# Patient Record
Sex: FEMALE | Race: WHITE | Employment: UNEMPLOYED | URBAN - NONMETROPOLITAN AREA
[De-identification: names, ages, dates, MRNs, and addresses within clinical notes are randomized per-mention and may not be internally consistent; named-entity substitution may affect disease eponyms.]

---

## 2017-01-05 ENCOUNTER — TELEPHONE (OUTPATIENT)
Dept: FAMILY MEDICINE CLINIC | Age: 62
End: 2017-01-05

## 2017-01-05 DIAGNOSIS — F17.200 NICOTINE USE DISORDER: ICD-10-CM

## 2017-01-05 RX ORDER — VARENICLINE TARTRATE 25 MG
KIT ORAL
Qty: 42 TABLET | Refills: 0 | Status: SHIPPED | OUTPATIENT
Start: 2017-01-05 | End: 2017-01-27 | Stop reason: ALTCHOICE

## 2017-01-05 RX ORDER — VARENICLINE TARTRATE 1 MG/1
1 TABLET, FILM COATED ORAL 2 TIMES DAILY
Qty: 60 TABLET | Refills: 3 | Status: SHIPPED | OUTPATIENT
Start: 2017-01-05 | End: 2017-01-27 | Stop reason: ALTCHOICE

## 2017-01-24 DIAGNOSIS — E78.5 HYPERLIPIDEMIA WITH TARGET LDL LESS THAN 130: ICD-10-CM

## 2017-01-24 RX ORDER — ATORVASTATIN CALCIUM 10 MG/1
TABLET, FILM COATED ORAL
Qty: 90 TABLET | Refills: 0 | Status: SHIPPED | OUTPATIENT
Start: 2017-01-24 | End: 2017-02-27 | Stop reason: SDUPTHER

## 2017-01-27 ENCOUNTER — OFFICE VISIT (OUTPATIENT)
Dept: FAMILY MEDICINE CLINIC | Age: 62
End: 2017-01-27

## 2017-01-27 VITALS
DIASTOLIC BLOOD PRESSURE: 76 MMHG | HEIGHT: 67 IN | OXYGEN SATURATION: 97 % | WEIGHT: 243.6 LBS | BODY MASS INDEX: 38.24 KG/M2 | SYSTOLIC BLOOD PRESSURE: 110 MMHG | HEART RATE: 97 BPM

## 2017-01-27 DIAGNOSIS — I10 ESSENTIAL HYPERTENSION: ICD-10-CM

## 2017-01-27 DIAGNOSIS — T78.3XXA ANGIOEDEMA, INITIAL ENCOUNTER: Primary | ICD-10-CM

## 2017-01-27 PROCEDURE — 99213 OFFICE O/P EST LOW 20 MIN: CPT | Performed by: FAMILY MEDICINE

## 2017-01-29 ASSESSMENT — ENCOUNTER SYMPTOMS
COUGH: 1
CHOKING: 1
STRIDOR: 1
TROUBLE SWALLOWING: 1
SHORTNESS OF BREATH: 1
FACIAL SWELLING: 1
VOICE CHANGE: 1

## 2017-01-30 ENCOUNTER — TELEPHONE (OUTPATIENT)
Dept: FAMILY MEDICINE CLINIC | Age: 62
End: 2017-01-30

## 2017-02-27 ENCOUNTER — OFFICE VISIT (OUTPATIENT)
Dept: FAMILY MEDICINE CLINIC | Age: 62
End: 2017-02-27

## 2017-02-27 VITALS
WEIGHT: 245 LBS | OXYGEN SATURATION: 92 % | SYSTOLIC BLOOD PRESSURE: 118 MMHG | HEART RATE: 85 BPM | DIASTOLIC BLOOD PRESSURE: 90 MMHG | BODY MASS INDEX: 38.45 KG/M2 | HEIGHT: 67 IN | RESPIRATION RATE: 18 BRPM

## 2017-02-27 DIAGNOSIS — I10 ESSENTIAL HYPERTENSION: ICD-10-CM

## 2017-02-27 DIAGNOSIS — F41.0 PANIC DISORDER: ICD-10-CM

## 2017-02-27 DIAGNOSIS — E78.5 HYPERLIPIDEMIA WITH TARGET LDL LESS THAN 130: ICD-10-CM

## 2017-02-27 DIAGNOSIS — F32.89 OTHER DEPRESSION: Primary | ICD-10-CM

## 2017-02-27 DIAGNOSIS — R30.0 DYSURIA: ICD-10-CM

## 2017-02-27 PROCEDURE — 99214 OFFICE O/P EST MOD 30 MIN: CPT | Performed by: FAMILY MEDICINE

## 2017-02-27 RX ORDER — HYDROCHLOROTHIAZIDE 25 MG/1
25 TABLET ORAL DAILY
Qty: 90 TABLET | Refills: 1 | Status: CANCELLED | OUTPATIENT
Start: 2017-02-27

## 2017-02-27 RX ORDER — CITALOPRAM 40 MG/1
40 TABLET ORAL DAILY
Qty: 90 TABLET | Refills: 1 | Status: SHIPPED | OUTPATIENT
Start: 2017-02-27 | End: 2017-08-21 | Stop reason: SDUPTHER

## 2017-02-27 RX ORDER — ATORVASTATIN CALCIUM 10 MG/1
TABLET, FILM COATED ORAL
Qty: 90 TABLET | Refills: 1 | Status: SHIPPED | OUTPATIENT
Start: 2017-02-27 | End: 2017-08-21 | Stop reason: SDUPTHER

## 2017-02-27 RX ORDER — CLONAZEPAM 1 MG/1
TABLET ORAL
Qty: 180 TABLET | Refills: 1 | Status: CANCELLED | OUTPATIENT
Start: 2017-02-27

## 2017-02-27 RX ORDER — HYDROCHLOROTHIAZIDE 25 MG/1
25 TABLET ORAL DAILY
Qty: 90 TABLET | Refills: 1 | Status: SHIPPED | OUTPATIENT
Start: 2017-02-27 | End: 2017-08-21 | Stop reason: SDUPTHER

## 2017-02-27 RX ORDER — CITALOPRAM 20 MG/1
TABLET ORAL
Qty: 90 TABLET | Refills: 1 | Status: CANCELLED | OUTPATIENT
Start: 2017-02-27

## 2017-02-27 RX ORDER — ATORVASTATIN CALCIUM 10 MG/1
TABLET, FILM COATED ORAL
Qty: 90 TABLET | Refills: 1 | Status: CANCELLED | OUTPATIENT
Start: 2017-02-27

## 2017-02-27 RX ORDER — CLONAZEPAM 0.5 MG/1
0.5 TABLET ORAL 2 TIMES DAILY
Qty: 180 TABLET | Refills: 0 | Status: SHIPPED | OUTPATIENT
Start: 2017-02-27 | End: 2017-05-26 | Stop reason: SDUPTHER

## 2017-02-27 ASSESSMENT — PATIENT HEALTH QUESTIONNAIRE - PHQ9
2. FEELING DOWN, DEPRESSED OR HOPELESS: 1
1. LITTLE INTEREST OR PLEASURE IN DOING THINGS: 1
SUM OF ALL RESPONSES TO PHQ9 QUESTIONS 1 & 2: 2
SUM OF ALL RESPONSES TO PHQ QUESTIONS 1-9: 2

## 2017-03-27 PROBLEM — Z72.0 TOBACCO ABUSE: Status: ACTIVE | Noted: 2017-03-27

## 2017-03-27 PROBLEM — R07.9 CHEST PAIN: Status: ACTIVE | Noted: 2017-03-27

## 2017-03-28 ENCOUNTER — CARE COORDINATION (OUTPATIENT)
Dept: CARE COORDINATION | Age: 62
End: 2017-03-28

## 2017-06-27 ENCOUNTER — OFFICE VISIT (OUTPATIENT)
Dept: ORTHOPEDIC SURGERY | Age: 62
End: 2017-06-27

## 2017-06-27 ENCOUNTER — TELEPHONE (OUTPATIENT)
Dept: ORTHOPEDIC SURGERY | Age: 62
End: 2017-06-27

## 2017-06-27 VITALS
DIASTOLIC BLOOD PRESSURE: 83 MMHG | SYSTOLIC BLOOD PRESSURE: 139 MMHG | BODY MASS INDEX: 38.44 KG/M2 | HEIGHT: 67 IN | HEART RATE: 75 BPM | WEIGHT: 244.93 LBS

## 2017-06-27 DIAGNOSIS — M51.36 DDD (DEGENERATIVE DISC DISEASE), LUMBAR: Primary | ICD-10-CM

## 2017-06-27 PROCEDURE — 99214 OFFICE O/P EST MOD 30 MIN: CPT | Performed by: PHYSICIAN ASSISTANT

## 2017-06-27 PROCEDURE — L0627 LO SAG RI AN/POS PNL PRE CST: HCPCS | Performed by: PHYSICIAN ASSISTANT

## 2017-06-27 PROCEDURE — 72110 X-RAY EXAM L-2 SPINE 4/>VWS: CPT | Performed by: PHYSICIAN ASSISTANT

## 2017-06-27 RX ORDER — CYCLOBENZAPRINE HCL 10 MG
TABLET ORAL
Qty: 30 TABLET | Refills: 0 | Status: SHIPPED | OUTPATIENT
Start: 2017-06-27 | End: 2017-07-31 | Stop reason: SDUPTHER

## 2017-06-27 RX ORDER — PREDNISONE 10 MG/1
TABLET ORAL
Qty: 26 TABLET | Refills: 0 | Status: SHIPPED | OUTPATIENT
Start: 2017-06-27 | End: 2017-08-21 | Stop reason: ALTCHOICE

## 2017-08-01 RX ORDER — CYCLOBENZAPRINE HCL 10 MG
TABLET ORAL
Qty: 30 TABLET | Refills: 0 | Status: SHIPPED | OUTPATIENT
Start: 2017-08-01 | End: 2017-08-21 | Stop reason: SDUPTHER

## 2017-08-16 DIAGNOSIS — Z12.31 SCREENING MAMMOGRAM, ENCOUNTER FOR: Primary | ICD-10-CM

## 2017-08-21 ENCOUNTER — OFFICE VISIT (OUTPATIENT)
Dept: FAMILY MEDICINE CLINIC | Age: 62
End: 2017-08-21

## 2017-08-21 VITALS
OXYGEN SATURATION: 97 % | BODY MASS INDEX: 38.45 KG/M2 | SYSTOLIC BLOOD PRESSURE: 120 MMHG | TEMPERATURE: 97.7 F | DIASTOLIC BLOOD PRESSURE: 76 MMHG | HEART RATE: 83 BPM | RESPIRATION RATE: 17 BRPM | WEIGHT: 245 LBS

## 2017-08-21 DIAGNOSIS — I10 ESSENTIAL HYPERTENSION: Primary | ICD-10-CM

## 2017-08-21 DIAGNOSIS — R31.9 HEMATURIA: ICD-10-CM

## 2017-08-21 DIAGNOSIS — M54.50 CHRONIC BILATERAL LOW BACK PAIN WITHOUT SCIATICA: ICD-10-CM

## 2017-08-21 DIAGNOSIS — F32.A DEPRESSION, UNSPECIFIED DEPRESSION TYPE: ICD-10-CM

## 2017-08-21 DIAGNOSIS — F41.0 PANIC DISORDER: ICD-10-CM

## 2017-08-21 DIAGNOSIS — G89.29 CHRONIC BILATERAL LOW BACK PAIN WITHOUT SCIATICA: ICD-10-CM

## 2017-08-21 DIAGNOSIS — E78.5 HYPERLIPIDEMIA WITH TARGET LDL LESS THAN 130: ICD-10-CM

## 2017-08-21 DIAGNOSIS — R73.02 IMPAIRED GLUCOSE TOLERANCE: ICD-10-CM

## 2017-08-21 LAB
BILIRUBIN URINE: NEGATIVE
BILIRUBIN, POC: ABNORMAL
BLOOD URINE, POC: ABNORMAL
BLOOD, URINE: NEGATIVE
CLARITY, POC: ABNORMAL
CLARITY: CLEAR
COLOR, POC: ABNORMAL
COLOR: YELLOW
EPITHELIAL CELLS, UA: 1 /HPF (ref 0–5)
GLUCOSE URINE, POC: ABNORMAL
GLUCOSE URINE: NEGATIVE MG/DL
HYALINE CASTS: 2 /LPF (ref 0–8)
KETONES, POC: ABNORMAL
KETONES, URINE: NEGATIVE MG/DL
LEUKOCYTE EST, POC: ABNORMAL
LEUKOCYTE ESTERASE, URINE: NEGATIVE
MICROSCOPIC EXAMINATION: NORMAL
NITRITE, POC: ABNORMAL
NITRITE, URINE: NEGATIVE
PH UA: 6
PH, POC: 6
PROTEIN UA: NEGATIVE MG/DL
PROTEIN, POC: ABNORMAL
RBC UA: 4 /HPF (ref 0–4)
SPECIFIC GRAVITY UA: 1.02
SPECIFIC GRAVITY, POC: 1.01
UROBILINOGEN, POC: 0.2
UROBILINOGEN, URINE: 0.2 E.U./DL
WBC UA: 1 /HPF (ref 0–5)

## 2017-08-21 PROCEDURE — 81001 URINALYSIS AUTO W/SCOPE: CPT | Performed by: FAMILY MEDICINE

## 2017-08-21 PROCEDURE — 81002 URINALYSIS NONAUTO W/O SCOPE: CPT | Performed by: FAMILY MEDICINE

## 2017-08-21 PROCEDURE — 99214 OFFICE O/P EST MOD 30 MIN: CPT | Performed by: FAMILY MEDICINE

## 2017-08-21 RX ORDER — CYCLOBENZAPRINE HCL 10 MG
TABLET ORAL
Qty: 30 TABLET | Refills: 5 | Status: SHIPPED | OUTPATIENT
Start: 2017-08-21 | End: 2018-02-14 | Stop reason: SDUPTHER

## 2017-08-21 RX ORDER — HYDROCHLOROTHIAZIDE 25 MG/1
25 TABLET ORAL DAILY
Qty: 90 TABLET | Refills: 1 | Status: SHIPPED | OUTPATIENT
Start: 2017-08-21 | End: 2018-02-14 | Stop reason: SDUPTHER

## 2017-08-21 RX ORDER — ATORVASTATIN CALCIUM 10 MG/1
TABLET, FILM COATED ORAL
Qty: 90 TABLET | Refills: 1 | Status: SHIPPED | OUTPATIENT
Start: 2017-08-21 | End: 2018-02-14 | Stop reason: SDUPTHER

## 2017-08-21 RX ORDER — CLONAZEPAM 0.5 MG/1
TABLET ORAL
Qty: 60 TABLET | Refills: 5 | Status: SHIPPED | OUTPATIENT
Start: 2017-08-21 | End: 2018-02-14 | Stop reason: SDUPTHER

## 2017-08-21 RX ORDER — CITALOPRAM 40 MG/1
40 TABLET ORAL DAILY
Qty: 90 TABLET | Refills: 1 | Status: SHIPPED | OUTPATIENT
Start: 2017-08-21 | End: 2018-02-14 | Stop reason: SDUPTHER

## 2017-08-22 LAB
ORGANISM: ABNORMAL
URINE CULTURE, ROUTINE: ABNORMAL

## 2017-08-28 ENCOUNTER — OFFICE VISIT (OUTPATIENT)
Dept: FAMILY MEDICINE CLINIC | Age: 62
End: 2017-08-28

## 2017-08-28 VITALS
SYSTOLIC BLOOD PRESSURE: 128 MMHG | RESPIRATION RATE: 16 BRPM | OXYGEN SATURATION: 96 % | DIASTOLIC BLOOD PRESSURE: 88 MMHG | HEIGHT: 67 IN | HEART RATE: 83 BPM | BODY MASS INDEX: 38.24 KG/M2 | WEIGHT: 243.6 LBS

## 2017-08-28 DIAGNOSIS — Z01.411 ENCOUNTER FOR GYNECOLOGICAL EXAMINATION WITH ABNORMAL FINDING: Primary | ICD-10-CM

## 2017-08-28 DIAGNOSIS — N89.8 DISCHARGE FROM THE VAGINA: ICD-10-CM

## 2017-08-28 DIAGNOSIS — R31.9 HEMATURIA: ICD-10-CM

## 2017-08-28 PROCEDURE — 99396 PREV VISIT EST AGE 40-64: CPT | Performed by: NURSE PRACTITIONER

## 2017-08-28 ASSESSMENT — ENCOUNTER SYMPTOMS: GASTROINTESTINAL NEGATIVE: 1

## 2017-08-30 LAB — GENITAL CULTURE, ROUTINE: NORMAL

## 2017-09-01 ENCOUNTER — TELEPHONE (OUTPATIENT)
Dept: FAMILY MEDICINE CLINIC | Age: 62
End: 2017-09-01

## 2017-09-05 DIAGNOSIS — R31.9 HEMATURIA: Primary | ICD-10-CM

## 2017-09-05 RX ORDER — METRONIDAZOLE 500 MG/1
TABLET ORAL
Qty: 8 TABLET | Refills: 0 | Status: SHIPPED | OUTPATIENT
Start: 2017-09-05 | End: 2017-12-14

## 2017-10-05 ENCOUNTER — HOSPITAL ENCOUNTER (OUTPATIENT)
Dept: CT IMAGING | Age: 62
Discharge: HOME | End: 2017-10-05
Attending: UROLOGY | Admitting: UROLOGY

## 2017-10-05 DIAGNOSIS — R31.0 GROSS HEMATURIA: ICD-10-CM

## 2017-10-05 DIAGNOSIS — R31.9 HEMATURIA: ICD-10-CM

## 2017-10-05 LAB
BUN BLDV-MCNC: 11 MG/DL (ref 7–20)
CREAT SERPL-MCNC: 0.6 MG/DL (ref 0.6–1.2)
GFR AFRICAN AMERICAN: >60
GFR NON-AFRICAN AMERICAN: >60

## 2017-10-09 ENCOUNTER — TELEPHONE (OUTPATIENT)
Dept: FAMILY MEDICINE CLINIC | Age: 62
End: 2017-10-09

## 2017-10-10 NOTE — TELEPHONE ENCOUNTER
CT of abdomennormal.  This test was ordered by urologist.  I am assuming they are planning to do a cystoscopy. is that correct?

## 2018-02-14 ENCOUNTER — OFFICE VISIT (OUTPATIENT)
Dept: FAMILY MEDICINE CLINIC | Age: 63
End: 2018-02-14

## 2018-02-14 VITALS
DIASTOLIC BLOOD PRESSURE: 85 MMHG | BODY MASS INDEX: 39.71 KG/M2 | SYSTOLIC BLOOD PRESSURE: 123 MMHG | OXYGEN SATURATION: 96 % | TEMPERATURE: 97.7 F | WEIGHT: 246 LBS | HEART RATE: 77 BPM

## 2018-02-14 DIAGNOSIS — I10 ESSENTIAL HYPERTENSION: Primary | ICD-10-CM

## 2018-02-14 DIAGNOSIS — F41.0 PANIC DISORDER: ICD-10-CM

## 2018-02-14 DIAGNOSIS — F32.A DEPRESSION, UNSPECIFIED DEPRESSION TYPE: ICD-10-CM

## 2018-02-14 DIAGNOSIS — E78.5 HYPERLIPIDEMIA WITH TARGET LDL LESS THAN 130: ICD-10-CM

## 2018-02-14 DIAGNOSIS — G89.29 CHRONIC BILATERAL LOW BACK PAIN WITHOUT SCIATICA: ICD-10-CM

## 2018-02-14 DIAGNOSIS — M54.17 LUMBOSACRAL RADICULOPATHY AT L5: ICD-10-CM

## 2018-02-14 DIAGNOSIS — M54.50 CHRONIC BILATERAL LOW BACK PAIN WITHOUT SCIATICA: ICD-10-CM

## 2018-02-14 DIAGNOSIS — F51.01 PRIMARY INSOMNIA: ICD-10-CM

## 2018-02-14 PROCEDURE — 99214 OFFICE O/P EST MOD 30 MIN: CPT | Performed by: FAMILY MEDICINE

## 2018-02-14 RX ORDER — CYCLOBENZAPRINE HCL 10 MG
TABLET ORAL
Qty: 30 TABLET | Refills: 5 | Status: SHIPPED | OUTPATIENT
Start: 2018-02-14 | End: 2018-08-01 | Stop reason: SDUPTHER

## 2018-02-14 RX ORDER — ATORVASTATIN CALCIUM 10 MG/1
TABLET, FILM COATED ORAL
Qty: 30 TABLET | Refills: 5 | Status: SHIPPED | OUTPATIENT
Start: 2018-02-14 | End: 2018-07-17 | Stop reason: SDUPTHER

## 2018-02-14 RX ORDER — HYDROCHLOROTHIAZIDE 25 MG/1
25 TABLET ORAL DAILY
Qty: 30 TABLET | Refills: 5 | Status: SHIPPED | OUTPATIENT
Start: 2018-02-14 | End: 2018-08-01 | Stop reason: SDUPTHER

## 2018-02-14 RX ORDER — ZOLPIDEM TARTRATE 5 MG/1
TABLET ORAL
Qty: 15 TABLET | Refills: 0 | Status: SHIPPED | OUTPATIENT
Start: 2018-02-14 | End: 2018-03-14

## 2018-02-14 RX ORDER — CLONAZEPAM 0.5 MG/1
TABLET ORAL
Qty: 60 TABLET | Refills: 5 | Status: SHIPPED | OUTPATIENT
Start: 2018-02-14 | End: 2018-08-01 | Stop reason: SDUPTHER

## 2018-02-14 RX ORDER — CITALOPRAM 40 MG/1
40 TABLET ORAL DAILY
Qty: 30 TABLET | Refills: 5 | Status: SHIPPED | OUTPATIENT
Start: 2018-02-14 | End: 2018-08-01 | Stop reason: SDUPTHER

## 2018-02-14 NOTE — PROGRESS NOTES
Subjective:      Patient ID: Quita Siddiqi is a 59 y.o. female. HPI   Chief Complaint   Patient presents with    Hyperlipidemia-no problem with compliance;no myalgias;      patient is fasting-barrrier to care-no insurance;$$$$    Hypertension-Has had a few panic attacks but not chest pain associated with exertion ;Denies cv/cns/kassi sx     Depression-Patient went on during this interview about heard terrible home situation with grandchildren children dogs. Sounds like. Chaos. On top of thatno medical insurancearguments within the familyfinancial problems     Anxiety-Reports some problems with increased anxiety.  Gastroesophageal Reflux-No recent flare in heartburn;no wt loss or progressive sx     Flu Vaccine     declined    Hematuria     see last visit;saw mark for gyne and saw dorothea Sahni-for blood-no cystoscopy-no insurance;knows she needs thst ;no more bl,ood in urine. Chief complaint and present illness: 61-year-old white female presents unaccompanied with multiple issues. Please see above    Review of Systems   All other systems reviewed and are negative. Objective:   Physical Exam   Constitutional: She is oriented to person, place, and time. She appears well-developed and well-nourished. Neck: Neck supple. Carotid bruit is not present. No thyromegaly present. Cardiovascular: Normal rate, regular rhythm and normal heart sounds. Pulmonary/Chest: Effort normal and breath sounds normal. She has no wheezes. Lymphadenopathy:     She has no cervical adenopathy. Neurological: She is alert and oriented to person, place, and time. Skin: Skin is warm. Psychiatric: She has a normal mood and affect. Her behavior is normal.   Nursing note and vitals reviewed.    /85 (Site: Left Arm, Position: Sitting, Cuff Size: Large Adult)   Pulse 77   Temp 97.7 °F (36.5 °C) (Oral)   Wt 246 lb (111.6 kg)   SpO2 96% Comment: room air  BMI 39.71 kg/m²       Assessment:      [facility]

## 2018-05-15 DIAGNOSIS — Z12.11 SCREENING FOR COLON CANCER: Primary | ICD-10-CM

## 2018-07-17 DIAGNOSIS — I10 ESSENTIAL HYPERTENSION: ICD-10-CM

## 2018-07-17 DIAGNOSIS — E78.5 HYPERLIPIDEMIA WITH TARGET LDL LESS THAN 130: ICD-10-CM

## 2018-07-17 RX ORDER — ATORVASTATIN CALCIUM 10 MG/1
TABLET, FILM COATED ORAL
Qty: 30 TABLET | Refills: 0 | Status: SHIPPED | OUTPATIENT
Start: 2018-07-17 | End: 2018-08-01 | Stop reason: SDUPTHER

## 2018-08-01 ENCOUNTER — OFFICE VISIT (OUTPATIENT)
Dept: FAMILY MEDICINE CLINIC | Age: 63
End: 2018-08-01

## 2018-08-01 VITALS
BODY MASS INDEX: 39.09 KG/M2 | SYSTOLIC BLOOD PRESSURE: 132 MMHG | HEART RATE: 83 BPM | OXYGEN SATURATION: 97 % | DIASTOLIC BLOOD PRESSURE: 82 MMHG | WEIGHT: 249.6 LBS

## 2018-08-01 DIAGNOSIS — G89.29 CHRONIC BILATERAL LOW BACK PAIN WITHOUT SCIATICA: ICD-10-CM

## 2018-08-01 DIAGNOSIS — F32.A DEPRESSION, UNSPECIFIED DEPRESSION TYPE: ICD-10-CM

## 2018-08-01 DIAGNOSIS — E78.5 HYPERLIPIDEMIA WITH TARGET LDL LESS THAN 130: ICD-10-CM

## 2018-08-01 DIAGNOSIS — M54.50 CHRONIC BILATERAL LOW BACK PAIN WITHOUT SCIATICA: ICD-10-CM

## 2018-08-01 DIAGNOSIS — I10 ESSENTIAL HYPERTENSION: ICD-10-CM

## 2018-08-01 DIAGNOSIS — F41.0 PANIC DISORDER: ICD-10-CM

## 2018-08-01 PROCEDURE — 99214 OFFICE O/P EST MOD 30 MIN: CPT | Performed by: FAMILY MEDICINE

## 2018-08-01 RX ORDER — HYDROCHLOROTHIAZIDE 25 MG/1
25 TABLET ORAL DAILY
Qty: 30 TABLET | Refills: 5 | Status: SHIPPED | OUTPATIENT
Start: 2018-08-01 | End: 2018-10-02 | Stop reason: ALTCHOICE

## 2018-08-01 RX ORDER — CITALOPRAM 40 MG/1
40 TABLET ORAL DAILY
Qty: 30 TABLET | Refills: 5 | Status: SHIPPED | OUTPATIENT
Start: 2018-08-01 | End: 2019-01-16 | Stop reason: SDUPTHER

## 2018-08-01 RX ORDER — CYCLOBENZAPRINE HCL 10 MG
TABLET ORAL
Qty: 30 TABLET | Refills: 5 | Status: SHIPPED | OUTPATIENT
Start: 2018-08-01 | End: 2019-01-16 | Stop reason: SDUPTHER

## 2018-08-01 RX ORDER — ATORVASTATIN CALCIUM 10 MG/1
TABLET, FILM COATED ORAL
Qty: 30 TABLET | Refills: 5 | Status: SHIPPED | OUTPATIENT
Start: 2018-08-01 | End: 2019-01-16 | Stop reason: SDUPTHER

## 2018-08-01 RX ORDER — CLONAZEPAM 0.5 MG/1
TABLET ORAL
Qty: 60 TABLET | Refills: 5 | Status: SHIPPED | OUTPATIENT
Start: 2018-08-01 | End: 2019-01-16 | Stop reason: SDUPTHER

## 2018-08-01 NOTE — PROGRESS NOTES
Subjective:      Patient ID: Karsten Alvarado is a 59 y.o. female. HPI  Chief Complaint   Patient presents with    Hyperlipidemia     6 month f/u; plans good; no myalgias    Hypertension     needs refill ; no cardiovascular, CNS or peripheral vascular complaints    Depression     Son has relapsed his opioid addiction and will be going back to [employer]; she had been hoping her grandson would be able to live with him and that her son would stay drug free; this is the third time.  Chronic Pain     Chronic bilateral low back pain without sciatica    Fall     7/1/2018: Cup in the dark to go to the restroom, tripped on a case of soft drinks which had been placed there by her nephew; discussion RE safety in the home     Chief complaint and present illness: 71-year-old white female presents unaccompanied for routine follow-up. Please see above    Review of Systems   All other systems reviewed and are negative. background/entire past medical,social and family history obtained and reviewed/updated today   Objective:   Physical Exam   Constitutional: She appears well-developed and well-nourished. Neck: Neck supple. Carotid bruit is not present. No thyromegaly present. Cardiovascular: Normal rate, regular rhythm and normal heart sounds. Pulmonary/Chest: Effort normal and breath sounds normal. She has no wheezes. Musculoskeletal: She exhibits no edema. Lymphadenopathy:     She has no cervical adenopathy. Neurological: She is alert. Skin: Skin is warm. Psychiatric: She has a normal mood and affect. Her behavior is normal.   Nursing note and vitals reviewed. /82   Pulse 83   Wt 249 lb 9.6 oz (113.2 kg)   SpO2 97%   BMI 39.09 kg/m²     Assessment:      Joshua Salmeron was seen today for hyperlipidemia, hypertension, depression, chronic pain and fall. Diagnoses and all orders for this visit:    Essential hypertension  -     hydrochlorothiazide (HYDRODIURIL) 25 MG tablet;  Take 1 tablet by mouth daily  - atorvastatin (LIPITOR) 10 MG tablet; TAKE (1) TABLET DAILY    Chronic bilateral low back pain without sciatica  -     cyclobenzaprine (FLEXERIL) 10 MG tablet; TAKE 1 TABLET AT BEDTIME AS NEEDED    Panic disorder  -     clonazePAM (KLONOPIN) 0.5 MG tablet; TAKE 1 TABLET TWICE DAILY. Depression, unspecified depression type  -     citalopram (CELEXA) 40 MG tablet; Take 1 tablet by mouth daily    Hyperlipidemia with target LDL less than 130  -     atorvastatin (LIPITOR) 10 MG tablet; TAKE (1) TABLET DAILY           Plan:      Return in about 6 months (around 2/1/2019). There are no Patient Instructions on file for this visit.

## 2018-10-02 ENCOUNTER — OFFICE VISIT (OUTPATIENT)
Dept: FAMILY MEDICINE CLINIC | Age: 63
End: 2018-10-02

## 2018-10-02 DIAGNOSIS — I10 ESSENTIAL HYPERTENSION: ICD-10-CM

## 2018-10-02 DIAGNOSIS — R60.9 DEPENDENT EDEMA: Primary | ICD-10-CM

## 2018-10-02 LAB
ALBUMIN SERPL-MCNC: 4.2 G/DL (ref 3.4–5)
ANION GAP SERPL CALCULATED.3IONS-SCNC: 14 MMOL/L (ref 3–16)
BILIRUBIN, POC: ABNORMAL
BLOOD URINE, POC: ABNORMAL
BUN BLDV-MCNC: 15 MG/DL (ref 7–20)
CALCIUM SERPL-MCNC: 9.8 MG/DL (ref 8.3–10.6)
CHLORIDE BLD-SCNC: 94 MMOL/L (ref 99–110)
CLARITY, POC: CLEAR
CO2: 30 MMOL/L (ref 21–32)
COLOR, POC: ABNORMAL
CREAT SERPL-MCNC: 0.6 MG/DL (ref 0.6–1.2)
GFR AFRICAN AMERICAN: >60
GFR NON-AFRICAN AMERICAN: >60
GLUCOSE BLD-MCNC: 115 MG/DL (ref 70–99)
GLUCOSE URINE, POC: ABNORMAL
KETONES, POC: ABNORMAL
LEUKOCYTE EST, POC: ABNORMAL
NITRITE, POC: ABNORMAL
PH, POC: 6
PHOSPHORUS: 3 MG/DL (ref 2.5–4.9)
POTASSIUM SERPL-SCNC: 3.8 MMOL/L (ref 3.5–5.1)
PROTEIN, POC: ABNORMAL
SODIUM BLD-SCNC: 138 MMOL/L (ref 136–145)
SPECIFIC GRAVITY, POC: 1.02
UROBILINOGEN, POC: 0.2

## 2018-10-02 PROCEDURE — 99213 OFFICE O/P EST LOW 20 MIN: CPT | Performed by: FAMILY MEDICINE

## 2018-10-02 PROCEDURE — 36415 COLL VENOUS BLD VENIPUNCTURE: CPT | Performed by: FAMILY MEDICINE

## 2018-10-02 PROCEDURE — 81002 URINALYSIS NONAUTO W/O SCOPE: CPT | Performed by: FAMILY MEDICINE

## 2018-10-02 RX ORDER — POTASSIUM CHLORIDE 20 MEQ/1
20 TABLET, EXTENDED RELEASE ORAL DAILY
Qty: 30 TABLET | Refills: 0 | Status: SHIPPED | OUTPATIENT
Start: 2018-10-02 | End: 2019-01-16 | Stop reason: SDUPTHER

## 2018-10-02 RX ORDER — FUROSEMIDE 20 MG/1
20 TABLET ORAL DAILY
Qty: 30 TABLET | Refills: 0 | Status: SHIPPED | OUTPATIENT
Start: 2018-10-02 | End: 2018-10-24 | Stop reason: SDUPTHER

## 2018-10-02 NOTE — PROGRESS NOTES
Subjective:      Patient ID: Saul Santoro is a 59 y.o. female. HPI  Chief Complaint   Patient presents with    Foot Swelling     both feet, started about 4 weeks ago- went back to work 7 weeks ago      Chief complaint present illness: 72-year-old white female with chronic pulmonary disease presents because of increased edema in lower extremities without pain for last 4 weeks. His there are even on day she doesn't work but worse on the day she worked. Less swollen in the morning more swollen in the evening. She sits at a work station all day and is working 50-60 hours per week. Review of Systems   Constitutional: Positive for activity change. Negative for unexpected weight change. Respiratory: Positive for shortness of breath and wheezing. Cardiovascular: Positive for leg swelling. Negative for chest pain and palpitations. background/entire past medical,social and family history obtained and reviewed/updated today   Objective:   Physical Exam   Constitutional: She appears well-developed and well-nourished. No distress. Neck: Neck supple. Cardiovascular: Normal rate, regular rhythm, normal heart sounds and intact distal pulses. Pulmonary/Chest: Effort normal. She has wheezes (scattered rhonchi bilaterally). She has no rales. She exhibits no tenderness. Musculoskeletal: She exhibits edema (bilateral 2+ pretibial edema). She exhibits no tenderness. Skin: Skin is warm. No erythema. Psychiatric: She has a normal mood and affect. Nursing note and vitals reviewed. BP (!) 140/90   Pulse 86   Ht 5' 7\" (1.702 m)   Wt 249 lb (112.9 kg)   SpO2 97%   BMI 39.00 kg/m²     Assessment:      Shila was seen today for foot swelling. Diagnoses and all orders for this visit:    Dependent edema  -     Renal Function Panel  -     POCT Urinalysis no Micro  -     furosemide (LASIX) 20 MG tablet;  Take 1 tablet by mouth daily  -     potassium chloride (KLOR-CON M) 20 MEQ extended release tablet;

## 2018-10-04 VITALS
DIASTOLIC BLOOD PRESSURE: 90 MMHG | HEIGHT: 67 IN | SYSTOLIC BLOOD PRESSURE: 140 MMHG | WEIGHT: 249 LBS | OXYGEN SATURATION: 97 % | BODY MASS INDEX: 39.08 KG/M2 | HEART RATE: 86 BPM

## 2018-10-04 ASSESSMENT — ENCOUNTER SYMPTOMS
WHEEZING: 1
SHORTNESS OF BREATH: 1

## 2018-10-10 DIAGNOSIS — R60.9 DEPENDENT EDEMA: Primary | ICD-10-CM

## 2018-10-10 DIAGNOSIS — R22.43 LOCALIZED SWELLING OF BOTH LOWER LEGS: ICD-10-CM

## 2018-10-24 ENCOUNTER — HOSPITAL ENCOUNTER (OUTPATIENT)
Dept: VASCULAR LAB | Age: 63
Discharge: HOME | End: 2018-10-24

## 2018-10-24 ENCOUNTER — HOSPITAL ENCOUNTER (OUTPATIENT)
Dept: NON INVASIVE DIAGNOSTICS | Age: 63
Discharge: HOME | End: 2018-10-24

## 2018-10-24 DIAGNOSIS — R60.9 DEPENDENT EDEMA: ICD-10-CM

## 2018-10-24 DIAGNOSIS — R22.43 LOCALIZED SWELLING OF BOTH LOWER LEGS: ICD-10-CM

## 2018-10-24 LAB
LV EF: 58 %
LVEF MODALITY: NORMAL

## 2018-10-24 PROCEDURE — 93970 EXTREMITY STUDY: CPT

## 2018-10-24 PROCEDURE — C8929 TTE W OR WO FOL WCON,DOPPLER: HCPCS | Performed by: INTERNAL MEDICINE

## 2018-10-24 PROCEDURE — 6360000004 HC RX CONTRAST MEDICATION: Performed by: FAMILY MEDICINE

## 2018-10-24 RX ADMIN — PERFLUTREN 2.2 MG: 6.52 INJECTION, SUSPENSION INTRAVENOUS at 13:41

## 2018-10-25 RX ORDER — FUROSEMIDE 20 MG/1
TABLET ORAL
Qty: 30 TABLET | Refills: 0 | Status: SHIPPED | OUTPATIENT
Start: 2018-10-25 | End: 2018-12-20 | Stop reason: SDUPTHER

## 2018-11-02 ENCOUNTER — TELEPHONE (OUTPATIENT)
Dept: FAMILY MEDICINE CLINIC | Age: 63
End: 2018-11-02

## 2018-11-02 NOTE — TELEPHONE ENCOUNTER
Patient calling for results of VL extremity venous bilater and US dup lower extremity done on 10/24/18.

## 2018-12-12 ENCOUNTER — OFFICE VISIT (OUTPATIENT)
Dept: FAMILY MEDICINE CLINIC | Age: 63
End: 2018-12-12

## 2018-12-12 VITALS
DIASTOLIC BLOOD PRESSURE: 61 MMHG | WEIGHT: 247 LBS | HEIGHT: 67 IN | SYSTOLIC BLOOD PRESSURE: 106 MMHG | BODY MASS INDEX: 38.77 KG/M2 | OXYGEN SATURATION: 97 % | HEART RATE: 101 BPM | TEMPERATURE: 98.3 F

## 2018-12-12 DIAGNOSIS — J20.9 ACUTE BRONCHITIS, UNSPECIFIED ORGANISM: Primary | ICD-10-CM

## 2018-12-12 PROCEDURE — 99213 OFFICE O/P EST LOW 20 MIN: CPT | Performed by: FAMILY MEDICINE

## 2018-12-12 RX ORDER — PROMETHAZINE HYDROCHLORIDE AND CODEINE PHOSPHATE 6.25; 1 MG/5ML; MG/5ML
5 SYRUP ORAL EVERY 6 HOURS PRN
Qty: 120 ML | Refills: 1 | Status: SHIPPED | OUTPATIENT
Start: 2018-12-12 | End: 2018-12-22

## 2018-12-12 RX ORDER — DOXYCYCLINE HYCLATE 100 MG
100 TABLET ORAL 2 TIMES DAILY
Qty: 14 TABLET | Refills: 0 | Status: SHIPPED | OUTPATIENT
Start: 2018-12-12 | End: 2018-12-19

## 2018-12-12 ASSESSMENT — ENCOUNTER SYMPTOMS
GASTROINTESTINAL NEGATIVE: 1
COUGH: 1
EYE DISCHARGE: 0
RHINORRHEA: 0
SHORTNESS OF BREATH: 1
CHOKING: 1

## 2019-01-11 ENCOUNTER — OFFICE VISIT (OUTPATIENT)
Dept: FAMILY MEDICINE CLINIC | Age: 64
End: 2019-01-11
Payer: COMMERCIAL

## 2019-01-11 VITALS
OXYGEN SATURATION: 95 % | HEART RATE: 85 BPM | WEIGHT: 247 LBS | DIASTOLIC BLOOD PRESSURE: 80 MMHG | TEMPERATURE: 98.5 F | BODY MASS INDEX: 38.69 KG/M2 | SYSTOLIC BLOOD PRESSURE: 117 MMHG

## 2019-01-11 DIAGNOSIS — R10.10 PAIN OF UPPER ABDOMEN: Primary | ICD-10-CM

## 2019-01-11 LAB
A/G RATIO: 1.4 (ref 1.1–2.2)
ALBUMIN SERPL-MCNC: 4.3 G/DL (ref 3.4–5)
ALP BLD-CCNC: 119 U/L (ref 40–129)
ALT SERPL-CCNC: 18 U/L (ref 10–40)
AMYLASE: 42 U/L (ref 25–115)
ANION GAP SERPL CALCULATED.3IONS-SCNC: 13 MMOL/L (ref 3–16)
AST SERPL-CCNC: 19 U/L (ref 15–37)
BILIRUB SERPL-MCNC: 0.6 MG/DL (ref 0–1)
BUN BLDV-MCNC: 10 MG/DL (ref 7–20)
CALCIUM SERPL-MCNC: 10 MG/DL (ref 8.3–10.6)
CHLORIDE BLD-SCNC: 99 MMOL/L (ref 99–110)
CO2: 30 MMOL/L (ref 21–32)
CREAT SERPL-MCNC: 0.6 MG/DL (ref 0.6–1.2)
GFR AFRICAN AMERICAN: >60
GFR NON-AFRICAN AMERICAN: >60
GLOBULIN: 3 G/DL
GLUCOSE BLD-MCNC: 139 MG/DL (ref 70–99)
LIPASE: 21 U/L (ref 13–60)
POTASSIUM SERPL-SCNC: 4.3 MMOL/L (ref 3.5–5.1)
SODIUM BLD-SCNC: 142 MMOL/L (ref 136–145)
TOTAL PROTEIN: 7.3 G/DL (ref 6.4–8.2)

## 2019-01-11 PROCEDURE — 36415 COLL VENOUS BLD VENIPUNCTURE: CPT | Performed by: NURSE PRACTITIONER

## 2019-01-11 PROCEDURE — 99214 OFFICE O/P EST MOD 30 MIN: CPT | Performed by: NURSE PRACTITIONER

## 2019-01-11 RX ORDER — AMOXICILLIN AND CLAVULANATE POTASSIUM 875; 125 MG/1; MG/1
1 TABLET, FILM COATED ORAL 2 TIMES DAILY
Qty: 14 TABLET | Refills: 0 | Status: SHIPPED | OUTPATIENT
Start: 2019-01-11 | End: 2019-01-18

## 2019-01-11 RX ORDER — METRONIDAZOLE 500 MG/1
500 TABLET ORAL 2 TIMES DAILY
Qty: 14 TABLET | Refills: 0 | Status: SHIPPED | OUTPATIENT
Start: 2019-01-11 | End: 2019-01-18

## 2019-01-11 ASSESSMENT — ENCOUNTER SYMPTOMS
COUGH: 0
SHORTNESS OF BREATH: 0
CONSTIPATION: 1
FLATUS: 1
WHEEZING: 0
SINUS PAIN: 0
VOMITING: 0
DIARRHEA: 1
NAUSEA: 1
BELCHING: 1
ABDOMINAL PAIN: 1
SORE THROAT: 0
ABDOMINAL DISTENTION: 1

## 2019-01-16 ENCOUNTER — OFFICE VISIT (OUTPATIENT)
Dept: FAMILY MEDICINE CLINIC | Age: 64
End: 2019-01-16
Payer: COMMERCIAL

## 2019-01-16 DIAGNOSIS — E78.5 HYPERLIPIDEMIA WITH TARGET LDL LESS THAN 130: ICD-10-CM

## 2019-01-16 DIAGNOSIS — I10 ESSENTIAL HYPERTENSION: Primary | ICD-10-CM

## 2019-01-16 DIAGNOSIS — M54.50 CHRONIC BILATERAL LOW BACK PAIN WITHOUT SCIATICA: ICD-10-CM

## 2019-01-16 DIAGNOSIS — F41.0 PANIC DISORDER: ICD-10-CM

## 2019-01-16 DIAGNOSIS — F32.A DEPRESSION, UNSPECIFIED DEPRESSION TYPE: ICD-10-CM

## 2019-01-16 DIAGNOSIS — R60.9 DEPENDENT EDEMA: ICD-10-CM

## 2019-01-16 DIAGNOSIS — G89.29 CHRONIC BILATERAL LOW BACK PAIN WITHOUT SCIATICA: ICD-10-CM

## 2019-01-16 DIAGNOSIS — R73.02 IMPAIRED GLUCOSE TOLERANCE: ICD-10-CM

## 2019-01-16 LAB
CHOLESTEROL, TOTAL: 134 MG/DL (ref 0–199)
HDLC SERPL-MCNC: 53 MG/DL (ref 40–60)
LDL CHOLESTEROL CALCULATED: 64 MG/DL
TRIGL SERPL-MCNC: 85 MG/DL (ref 0–150)
VLDLC SERPL CALC-MCNC: 17 MG/DL

## 2019-01-16 PROCEDURE — 36415 COLL VENOUS BLD VENIPUNCTURE: CPT | Performed by: FAMILY MEDICINE

## 2019-01-16 PROCEDURE — 99214 OFFICE O/P EST MOD 30 MIN: CPT | Performed by: FAMILY MEDICINE

## 2019-01-16 RX ORDER — CYCLOBENZAPRINE HCL 10 MG
TABLET ORAL
Qty: 30 TABLET | Refills: 5 | Status: SHIPPED | OUTPATIENT
Start: 2019-01-16 | End: 2020-01-15

## 2019-01-16 RX ORDER — CITALOPRAM 40 MG/1
40 TABLET ORAL DAILY
Qty: 30 TABLET | Refills: 5 | Status: SHIPPED | OUTPATIENT
Start: 2019-01-16 | End: 2019-08-30 | Stop reason: SDUPTHER

## 2019-01-16 RX ORDER — POTASSIUM CHLORIDE 20 MEQ/1
20 TABLET, EXTENDED RELEASE ORAL DAILY
Qty: 30 TABLET | Refills: 0 | Status: SHIPPED | OUTPATIENT
Start: 2019-01-16 | End: 2019-02-25 | Stop reason: SDUPTHER

## 2019-01-16 RX ORDER — FUROSEMIDE 20 MG/1
TABLET ORAL
Qty: 30 TABLET | Refills: 5 | Status: SHIPPED | OUTPATIENT
Start: 2019-01-16 | End: 2020-01-29 | Stop reason: SDUPTHER

## 2019-01-16 RX ORDER — CLONAZEPAM 0.5 MG/1
TABLET ORAL
Qty: 60 TABLET | Refills: 5 | Status: SHIPPED | OUTPATIENT
Start: 2019-01-16 | End: 2019-07-19 | Stop reason: SDUPTHER

## 2019-01-16 RX ORDER — ATORVASTATIN CALCIUM 10 MG/1
TABLET, FILM COATED ORAL
Qty: 30 TABLET | Refills: 5 | Status: SHIPPED | OUTPATIENT
Start: 2019-01-16 | End: 2019-08-30 | Stop reason: SDUPTHER

## 2019-01-17 LAB
ESTIMATED AVERAGE GLUCOSE: 131.2 MG/DL
HBA1C MFR BLD: 6.2 %

## 2019-01-19 VITALS
OXYGEN SATURATION: 97 % | DIASTOLIC BLOOD PRESSURE: 78 MMHG | SYSTOLIC BLOOD PRESSURE: 124 MMHG | HEART RATE: 77 BPM | BODY MASS INDEX: 39.24 KG/M2 | WEIGHT: 250 LBS | HEIGHT: 67 IN

## 2019-01-19 ASSESSMENT — ENCOUNTER SYMPTOMS
EYES NEGATIVE: 1
RESPIRATORY NEGATIVE: 1

## 2019-02-07 ENCOUNTER — HOSPITAL ENCOUNTER (EMERGENCY)
Age: 64
Discharge: HOME | End: 2019-02-07
Attending: EMERGENCY MEDICINE
Payer: COMMERCIAL

## 2019-02-07 VITALS
SYSTOLIC BLOOD PRESSURE: 158 MMHG | BODY MASS INDEX: 39.24 KG/M2 | WEIGHT: 250 LBS | OXYGEN SATURATION: 98 % | RESPIRATION RATE: 18 BRPM | TEMPERATURE: 98 F | HEIGHT: 67 IN | HEART RATE: 74 BPM | DIASTOLIC BLOOD PRESSURE: 88 MMHG

## 2019-02-07 DIAGNOSIS — M54.31 SCIATICA OF RIGHT SIDE: Primary | ICD-10-CM

## 2019-02-07 PROCEDURE — 99282 EMERGENCY DEPT VISIT SF MDM: CPT

## 2019-02-07 RX ORDER — OXYCODONE HYDROCHLORIDE AND ACETAMINOPHEN 5; 325 MG/1; MG/1
1 TABLET ORAL EVERY 6 HOURS PRN
Qty: 20 TABLET | Refills: 0 | Status: SHIPPED | OUTPATIENT
Start: 2019-02-07 | End: 2019-02-12

## 2019-02-07 RX ORDER — METHYLPREDNISOLONE 4 MG/1
TABLET ORAL
Qty: 1 KIT | Refills: 0 | Status: SHIPPED | OUTPATIENT
Start: 2019-02-07 | End: 2019-02-25 | Stop reason: ALTCHOICE

## 2019-02-07 ASSESSMENT — ENCOUNTER SYMPTOMS
BACK PAIN: 1
VOMITING: 0
COUGH: 0
NAUSEA: 0
ABDOMINAL PAIN: 0
SORE THROAT: 0
EYE DISCHARGE: 0
DIARRHEA: 0

## 2019-02-07 ASSESSMENT — PAIN SCALES - GENERAL: PAINLEVEL_OUTOF10: 7

## 2019-02-07 ASSESSMENT — PAIN DESCRIPTION - PAIN TYPE: TYPE: ACUTE PAIN

## 2019-02-07 ASSESSMENT — PAIN DESCRIPTION - ORIENTATION: ORIENTATION: RIGHT

## 2019-02-07 ASSESSMENT — PAIN DESCRIPTION - DESCRIPTORS: DESCRIPTORS: ACHING

## 2019-02-07 ASSESSMENT — PAIN DESCRIPTION - LOCATION: LOCATION: HIP;LEG

## 2019-02-07 ASSESSMENT — PAIN DESCRIPTION - FREQUENCY: FREQUENCY: CONTINUOUS

## 2019-02-25 ENCOUNTER — OFFICE VISIT (OUTPATIENT)
Dept: FAMILY MEDICINE CLINIC | Age: 64
End: 2019-02-25
Payer: COMMERCIAL

## 2019-02-25 VITALS
SYSTOLIC BLOOD PRESSURE: 116 MMHG | HEART RATE: 85 BPM | DIASTOLIC BLOOD PRESSURE: 70 MMHG | WEIGHT: 245 LBS | OXYGEN SATURATION: 96 % | TEMPERATURE: 98.1 F | BODY MASS INDEX: 38.37 KG/M2

## 2019-02-25 DIAGNOSIS — G47.33 OSA (OBSTRUCTIVE SLEEP APNEA): ICD-10-CM

## 2019-02-25 DIAGNOSIS — E87.6 HYPOKALEMIA: ICD-10-CM

## 2019-02-25 DIAGNOSIS — M47.892 OTHER OSTEOARTHRITIS OF SPINE, CERVICAL REGION: ICD-10-CM

## 2019-02-25 DIAGNOSIS — R51.9 HEADACHE DISORDER: Primary | ICD-10-CM

## 2019-02-25 DIAGNOSIS — R60.9 DEPENDENT EDEMA: ICD-10-CM

## 2019-02-25 PROCEDURE — 99214 OFFICE O/P EST MOD 30 MIN: CPT | Performed by: FAMILY MEDICINE

## 2019-02-25 RX ORDER — NAPROXEN 500 MG/1
500 TABLET ORAL 2 TIMES DAILY WITH MEALS
Qty: 30 TABLET | Refills: 0 | Status: SHIPPED | OUTPATIENT
Start: 2019-02-25 | End: 2019-04-11

## 2019-02-25 RX ORDER — COVID-19 ANTIGEN TEST
KIT MISCELLANEOUS
COMMUNITY
Start: 2019-02-18 | End: 2019-04-11

## 2019-02-25 RX ORDER — POTASSIUM CHLORIDE 20 MEQ/1
20 TABLET, EXTENDED RELEASE ORAL DAILY
Qty: 30 TABLET | Refills: 5 | Status: SHIPPED | OUTPATIENT
Start: 2019-02-25 | End: 2020-07-29 | Stop reason: SDUPTHER

## 2019-02-25 ASSESSMENT — ENCOUNTER SYMPTOMS
FACIAL SWELLING: 0
RHINORRHEA: 0
VOMITING: 0
RESPIRATORY NEGATIVE: 1
EYES NEGATIVE: 1
DIARRHEA: 0

## 2019-02-26 ENCOUNTER — HOSPITAL ENCOUNTER (OUTPATIENT)
Age: 64
Discharge: HOME | End: 2019-02-26
Payer: COMMERCIAL

## 2019-02-26 ENCOUNTER — HOSPITAL ENCOUNTER (OUTPATIENT)
Dept: GENERAL RADIOLOGY | Age: 64
Discharge: HOME | End: 2019-02-26
Payer: COMMERCIAL

## 2019-02-26 DIAGNOSIS — M47.892 OTHER OSTEOARTHRITIS OF SPINE, CERVICAL REGION: ICD-10-CM

## 2019-02-26 DIAGNOSIS — R51.9 HEADACHE DISORDER: ICD-10-CM

## 2019-02-26 PROCEDURE — 72040 X-RAY EXAM NECK SPINE 2-3 VW: CPT

## 2019-02-27 DIAGNOSIS — R51.9 NONINTRACTABLE HEADACHE, UNSPECIFIED CHRONICITY PATTERN, UNSPECIFIED HEADACHE TYPE: Primary | ICD-10-CM

## 2019-03-13 ENCOUNTER — HOSPITAL ENCOUNTER (OUTPATIENT)
Dept: MRI IMAGING | Age: 64
Discharge: HOME | End: 2019-03-13
Payer: COMMERCIAL

## 2019-03-13 DIAGNOSIS — G93.2 INTRACRANIAL HYPERTENSION: Primary | ICD-10-CM

## 2019-03-13 DIAGNOSIS — R51.9 NONINTRACTABLE HEADACHE, UNSPECIFIED CHRONICITY PATTERN, UNSPECIFIED HEADACHE TYPE: ICD-10-CM

## 2019-03-13 PROCEDURE — 70551 MRI BRAIN STEM W/O DYE: CPT

## 2019-03-28 ENCOUNTER — OFFICE VISIT (OUTPATIENT)
Dept: FAMILY MEDICINE CLINIC | Age: 64
End: 2019-03-28
Payer: COMMERCIAL

## 2019-03-28 VITALS
HEART RATE: 74 BPM | SYSTOLIC BLOOD PRESSURE: 132 MMHG | WEIGHT: 248 LBS | BODY MASS INDEX: 38.92 KG/M2 | HEIGHT: 67 IN | OXYGEN SATURATION: 97 % | DIASTOLIC BLOOD PRESSURE: 88 MMHG

## 2019-03-28 DIAGNOSIS — F32.89 OTHER DEPRESSION: Primary | ICD-10-CM

## 2019-03-28 PROCEDURE — 99213 OFFICE O/P EST LOW 20 MIN: CPT | Performed by: FAMILY MEDICINE

## 2019-03-28 RX ORDER — MIRTAZAPINE 15 MG/1
15 TABLET, FILM COATED ORAL NIGHTLY
Qty: 30 TABLET | Refills: 0 | Status: SHIPPED | OUTPATIENT
Start: 2019-03-28 | End: 2019-04-18 | Stop reason: SDUPTHER

## 2019-03-29 ASSESSMENT — ENCOUNTER SYMPTOMS: RESPIRATORY NEGATIVE: 1

## 2019-04-11 ENCOUNTER — HOSPITAL ENCOUNTER (OUTPATIENT)
Dept: INTERVENTIONAL RADIOLOGY/VASCULAR | Age: 64
Discharge: HOME | End: 2019-04-11
Payer: COMMERCIAL

## 2019-04-11 VITALS
SYSTOLIC BLOOD PRESSURE: 140 MMHG | TEMPERATURE: 96.6 F | BODY MASS INDEX: 39.86 KG/M2 | DIASTOLIC BLOOD PRESSURE: 74 MMHG | HEART RATE: 70 BPM | WEIGHT: 248 LBS | RESPIRATION RATE: 18 BRPM | OXYGEN SATURATION: 99 % | HEIGHT: 66 IN

## 2019-04-11 DIAGNOSIS — G93.2 BENIGN INTRACRANIAL HYPERTENSION: ICD-10-CM

## 2019-04-11 DIAGNOSIS — M47.812 CERVICAL SPONDYLOSIS WITHOUT MYELOPATHY: ICD-10-CM

## 2019-04-11 LAB
APPEARANCE CSF: CLEAR
CLOT EVALUATION CSF: ABNORMAL
COLOR CSF: COLORLESS
GLUCOSE, CSF: 70 MG/DL (ref 40–80)
NO DIFFERENTIAL CSF: ABNORMAL
PROTEIN CSF: 34 MG/DL (ref 15–45)
RBC CSF: 98 /CUMM
TUBE NUMBER CSF: ABNORMAL
WBC CSF: 2 /CUMM (ref 0–5)

## 2019-04-11 PROCEDURE — 7100000010 HC PHASE II RECOVERY - FIRST 15 MIN

## 2019-04-11 PROCEDURE — 82945 GLUCOSE OTHER FLUID: CPT

## 2019-04-11 PROCEDURE — 88112 CYTOPATH CELL ENHANCE TECH: CPT

## 2019-04-11 PROCEDURE — 89050 BODY FLUID CELL COUNT: CPT

## 2019-04-11 PROCEDURE — 62270 DX LMBR SPI PNXR: CPT

## 2019-04-11 PROCEDURE — 84157 ASSAY OF PROTEIN OTHER: CPT

## 2019-04-11 PROCEDURE — 77003 FLUOROGUIDE FOR SPINE INJECT: CPT

## 2019-04-11 PROCEDURE — 7100000011 HC PHASE II RECOVERY - ADDTL 15 MIN

## 2019-04-11 ASSESSMENT — PAIN SCALES - GENERAL
PAINLEVEL_OUTOF10: 0

## 2019-04-11 ASSESSMENT — PAIN - FUNCTIONAL ASSESSMENT: PAIN_FUNCTIONAL_ASSESSMENT: 0-10

## 2019-04-11 NOTE — PROGRESS NOTES
Pt ambulates to bathroom no c/o pain discharge instructions reviewed with patient and family member verbalizes understanding. Band aid remains dry and intact to biopsy site.  Discharged via wheelchair in stable condition

## 2019-04-18 ENCOUNTER — OFFICE VISIT (OUTPATIENT)
Dept: FAMILY MEDICINE CLINIC | Age: 64
End: 2019-04-18
Payer: COMMERCIAL

## 2019-04-18 VITALS
BODY MASS INDEX: 40.61 KG/M2 | WEIGHT: 251.6 LBS | SYSTOLIC BLOOD PRESSURE: 122 MMHG | OXYGEN SATURATION: 95 % | DIASTOLIC BLOOD PRESSURE: 81 MMHG | HEART RATE: 74 BPM

## 2019-04-18 DIAGNOSIS — F32.89 OTHER DEPRESSION: ICD-10-CM

## 2019-04-18 PROCEDURE — 99213 OFFICE O/P EST LOW 20 MIN: CPT | Performed by: FAMILY MEDICINE

## 2019-04-18 RX ORDER — MIRTAZAPINE 15 MG/1
15 TABLET, FILM COATED ORAL NIGHTLY
Qty: 30 TABLET | Refills: 5 | Status: SHIPPED | OUTPATIENT
Start: 2019-04-18 | End: 2019-04-23 | Stop reason: SDUPTHER

## 2019-04-18 ASSESSMENT — ENCOUNTER SYMPTOMS: RESPIRATORY NEGATIVE: 1

## 2019-04-18 NOTE — PROGRESS NOTES
Subjective:      Patient ID: Jos Newman is a 62 y.o. female. HPI  Chief Complaint   Patient presents with    Depression     3 week f/u started mirtazapine (Manjit Hirsch) 15 MG tablet//patient states it seems to be working     Chief complaint and present illness: 78-year-old white female in follow-up after Remeron added to her antidepressant therapy of citalopram.  Patient in the intervening 3 weeks-is now sleeping much better and reports she feels better mood carrasco. Dr. Colt Helms finished his test for normal pressure hydrocephalus and she doesn't have it. She signed decide if she wants cervical spine injections for cervical radiculopathy recognized the fact that it's only temporary relief. She just feels a whole lot better. No problems with the medication    Review of Systems   Constitutional: Positive for activity change (better) and fatigue (less). Respiratory: Negative. Cardiovascular: Negative. Neurological: Positive for headaches (mucch improved). Psychiatric/Behavioral: Negative for decreased concentration, dysphoric mood, self-injury, sleep disturbance and suicidal ideas. background/entire past medical,social and family history obtained and reviewed/updated today   Objective:   Physical Exam   Constitutional: She appears well-developed and well-nourished. Cardiovascular: Normal rate and regular rhythm. Neurological: She is alert. Skin: Skin is warm. Psychiatric: She has a normal mood and affect. Her behavior is normal.   Nursing note and vitals reviewed. /81 (Site: Left Upper Arm, Position: Sitting, Cuff Size: Large Adult)   Pulse 74   Wt 251 lb 9.6 oz (114.1 kg)   SpO2 95%   BMI 40.61 kg/m²   >20 minutes with patient, all one on one or with accompanying family member re illness, possible etiologies,w/u and treatment with greater than 50% of time in counseling for: depression  Assessment:      Monica Jaramillo was seen today for depression.     Diagnoses and all orders for this visit:    Other depression  -     mirtazapine (REMERON) 15 MG tablet; Take 1 tablet by mouth nightly           Plan:      No follow-ups on file. There are no Patient Instructions on file for this visit.          Corina Acharya

## 2019-04-23 DIAGNOSIS — F32.89 OTHER DEPRESSION: ICD-10-CM

## 2019-04-23 RX ORDER — NAPROXEN 500 MG/1
TABLET ORAL
Qty: 30 TABLET | Refills: 0 | Status: SHIPPED | OUTPATIENT
Start: 2019-04-23 | End: 2019-09-24 | Stop reason: ALTCHOICE

## 2019-04-23 RX ORDER — MIRTAZAPINE 15 MG/1
TABLET, FILM COATED ORAL
Qty: 30 TABLET | Refills: 0 | Status: SHIPPED | OUTPATIENT
Start: 2019-04-23 | End: 2020-01-29

## 2019-04-29 RX ORDER — PROPARACAINE HYDROCHLORIDE 5 MG/ML
1 SOLUTION/ DROPS OPHTHALMIC ONCE
Status: CANCELLED | OUTPATIENT
Start: 2019-05-01

## 2019-04-29 RX ORDER — PREDNISOLONE ACETATE 10 MG/ML
1 SUSPENSION/ DROPS OPHTHALMIC ONCE
Status: CANCELLED | OUTPATIENT
Start: 2019-05-01

## 2019-04-29 RX ORDER — PILOCARPINE HYDROCHLORIDE 20 MG/ML
1 SOLUTION/ DROPS OPHTHALMIC ONCE
Status: CANCELLED | OUTPATIENT
Start: 2019-05-01

## 2019-04-30 RX ORDER — PREDNISOLONE ACETATE 10 MG/ML
1 SUSPENSION/ DROPS OPHTHALMIC ONCE
Status: CANCELLED | OUTPATIENT
Start: 2019-05-08

## 2019-04-30 RX ORDER — PROPARACAINE HYDROCHLORIDE 5 MG/ML
1 SOLUTION/ DROPS OPHTHALMIC ONCE
Status: CANCELLED | OUTPATIENT
Start: 2019-05-08

## 2019-04-30 RX ORDER — PILOCARPINE HYDROCHLORIDE 20 MG/ML
1 SOLUTION/ DROPS OPHTHALMIC ONCE
Status: CANCELLED | OUTPATIENT
Start: 2019-05-08

## 2019-05-01 ENCOUNTER — HOSPITAL ENCOUNTER (OUTPATIENT)
Dept: OPERATING ROOM | Age: 64
Setting detail: OUTPATIENT SURGERY
Discharge: HOME | End: 2019-05-01
Payer: COMMERCIAL

## 2019-05-01 VITALS
HEART RATE: 83 BPM | SYSTOLIC BLOOD PRESSURE: 128 MMHG | OXYGEN SATURATION: 97 % | DIASTOLIC BLOOD PRESSURE: 91 MMHG | RESPIRATION RATE: 20 BRPM

## 2019-05-01 PROCEDURE — 66761 REVISION OF IRIS: CPT

## 2019-05-01 PROCEDURE — 6370000000 HC RX 637 (ALT 250 FOR IP): Performed by: OPHTHALMOLOGY

## 2019-05-01 RX ORDER — PILOCARPINE HYDROCHLORIDE 20 MG/ML
1 SOLUTION/ DROPS OPHTHALMIC ONCE
Status: COMPLETED | OUTPATIENT
Start: 2019-05-08 | End: 2019-05-01

## 2019-05-01 RX ORDER — PREDNISOLONE ACETATE 10 MG/ML
1 SUSPENSION/ DROPS OPHTHALMIC ONCE
Status: COMPLETED | OUTPATIENT
Start: 2019-05-08 | End: 2019-05-01

## 2019-05-01 RX ORDER — PROPARACAINE HYDROCHLORIDE 5 MG/ML
1 SOLUTION/ DROPS OPHTHALMIC ONCE
Status: COMPLETED | OUTPATIENT
Start: 2019-05-08 | End: 2019-05-01

## 2019-05-01 RX ADMIN — APRACLONIDINE HYDROCHLORIDE 1 DROP: 10 SOLUTION/ DROPS OPHTHALMIC at 14:21

## 2019-05-01 RX ADMIN — PREDNISOLONE ACETATE 1 DROP: 10 SUSPENSION/ DROPS OPHTHALMIC at 14:39

## 2019-05-01 RX ADMIN — PROPARACAINE HYDROCHLORIDE 1 DROP: 5 SOLUTION/ DROPS OPHTHALMIC at 14:35

## 2019-05-01 RX ADMIN — APRACLONIDINE HYDROCHLORIDE 1 DROP: 10 SOLUTION/ DROPS OPHTHALMIC at 14:39

## 2019-05-01 RX ADMIN — PILOCARPINE HYDROCHLORIDE 1 DROP: 20 SOLUTION/ DROPS OPHTHALMIC at 14:15

## 2019-05-08 ENCOUNTER — HOSPITAL ENCOUNTER (OUTPATIENT)
Dept: OPERATING ROOM | Age: 64
Setting detail: OUTPATIENT SURGERY
Discharge: HOME | End: 2019-05-08
Payer: COMMERCIAL

## 2019-05-08 VITALS — HEART RATE: 73 BPM | SYSTOLIC BLOOD PRESSURE: 150 MMHG | DIASTOLIC BLOOD PRESSURE: 91 MMHG

## 2019-05-08 PROCEDURE — 6370000000 HC RX 637 (ALT 250 FOR IP): Performed by: OPHTHALMOLOGY

## 2019-05-08 PROCEDURE — 66761 REVISION OF IRIS: CPT

## 2019-05-08 RX ORDER — PILOCARPINE HYDROCHLORIDE 20 MG/ML
1 SOLUTION/ DROPS OPHTHALMIC ONCE
Status: COMPLETED | OUTPATIENT
Start: 2019-05-08 | End: 2019-05-08

## 2019-05-08 RX ORDER — PROPARACAINE HYDROCHLORIDE 5 MG/ML
1 SOLUTION/ DROPS OPHTHALMIC
Status: COMPLETED | OUTPATIENT
Start: 2019-05-08 | End: 2019-05-08

## 2019-05-08 RX ORDER — PREDNISOLONE ACETATE 10 MG/ML
1 SUSPENSION/ DROPS OPHTHALMIC
Status: COMPLETED | OUTPATIENT
Start: 2019-05-08 | End: 2019-05-08

## 2019-05-08 RX ADMIN — PILOCARPINE HYDROCHLORIDE 1 DROP: 20 SOLUTION/ DROPS OPHTHALMIC at 15:23

## 2019-05-08 RX ADMIN — APRACLONIDINE HYDROCHLORIDE 1 DROP: 10 SOLUTION/ DROPS OPHTHALMIC at 15:47

## 2019-05-08 RX ADMIN — APRACLONIDINE HYDROCHLORIDE 1 DROP: 10 SOLUTION/ DROPS OPHTHALMIC at 15:26

## 2019-05-08 RX ADMIN — PREDNISOLONE ACETATE 1 DROP: 10 SUSPENSION/ DROPS OPHTHALMIC at 15:47

## 2019-05-08 RX ADMIN — PROPARACAINE HYDROCHLORIDE 1 DROP: 5 SOLUTION/ DROPS OPHTHALMIC at 15:46

## 2019-05-09 NOTE — OP NOTE
OPERATIVE NOTE      Surgery Date:   05/08/2019       Pre-operative Diagnosis:   Narrow Angle Glaucoma    Procedure:   YAG Peripheral Iridotomy right eye    Anesthesia:  Topical    Complications:  None    The patient tolerated the procedure well and will follow up as an outpatient in my office as scheduled. Alva Sylvester M.D.

## 2019-06-25 ENCOUNTER — TELEPHONE (OUTPATIENT)
Dept: FAMILY MEDICINE CLINIC | Age: 64
End: 2019-06-25

## 2019-06-25 NOTE — TELEPHONE ENCOUNTER
Patient is overdue for mammogram. Calling to offer patient appointment on the mobile unit that will be at our office on 7/5/19. Patient is currently uninsured, I gave her information for the [facility] project. As soon as she enrolls she is eligible for free mammogram every 2 years.

## 2019-07-19 DIAGNOSIS — F41.0 PANIC DISORDER: ICD-10-CM

## 2019-07-19 RX ORDER — CLONAZEPAM 0.5 MG/1
TABLET ORAL
Qty: 60 TABLET | Refills: 5 | Status: SHIPPED | OUTPATIENT
Start: 2019-07-19 | End: 2020-03-03 | Stop reason: SDUPTHER

## 2019-08-13 ENCOUNTER — TELEPHONE (OUTPATIENT)
Dept: FAMILY MEDICINE CLINIC | Age: 64
End: 2019-08-13

## 2019-08-27 ENCOUNTER — TELEPHONE (OUTPATIENT)
Dept: FAMILY MEDICINE CLINIC | Age: 64
End: 2019-08-27

## 2019-09-24 ENCOUNTER — OFFICE VISIT (OUTPATIENT)
Dept: FAMILY MEDICINE CLINIC | Age: 64
End: 2019-09-24

## 2019-09-24 VITALS
BODY MASS INDEX: 43.23 KG/M2 | TEMPERATURE: 98 F | OXYGEN SATURATION: 96 % | DIASTOLIC BLOOD PRESSURE: 80 MMHG | SYSTOLIC BLOOD PRESSURE: 122 MMHG | WEIGHT: 269 LBS | HEART RATE: 73 BPM | HEIGHT: 66 IN

## 2019-09-24 DIAGNOSIS — R10.12 INTERMITTENT LEFT UPPER QUADRANT ABDOMINAL PAIN: Primary | ICD-10-CM

## 2019-09-24 LAB
BACTERIA: ABNORMAL /HPF
BASOPHILS ABSOLUTE: 0 K/UL (ref 0–0.2)
BASOPHILS RELATIVE PERCENT: 0.4 %
BILIRUBIN URINE: NEGATIVE
BLOOD, URINE: NEGATIVE
CLARITY: ABNORMAL
COLOR: ABNORMAL
EOSINOPHILS ABSOLUTE: 0.3 K/UL (ref 0–0.6)
EOSINOPHILS RELATIVE PERCENT: 3.7 %
EPITHELIAL CELLS, UA: 1 /HPF (ref 0–5)
GLUCOSE URINE: NEGATIVE MG/DL
HCT VFR BLD CALC: 41.3 % (ref 36–48)
HEMOGLOBIN: 13.8 G/DL (ref 12–16)
HYALINE CASTS: 1 /LPF (ref 0–8)
KETONES, URINE: NEGATIVE MG/DL
LEUKOCYTE ESTERASE, URINE: ABNORMAL
LYMPHOCYTES ABSOLUTE: 2.2 K/UL (ref 1–5.1)
LYMPHOCYTES RELATIVE PERCENT: 27.9 %
MCH RBC QN AUTO: 30.4 PG (ref 26–34)
MCHC RBC AUTO-ENTMCNC: 33.5 G/DL (ref 31–36)
MCV RBC AUTO: 90.6 FL (ref 80–100)
MICROSCOPIC EXAMINATION: YES
MONOCYTES ABSOLUTE: 0.6 K/UL (ref 0–1.3)
MONOCYTES RELATIVE PERCENT: 7.8 %
NEUTROPHILS ABSOLUTE: 4.8 K/UL (ref 1.7–7.7)
NEUTROPHILS RELATIVE PERCENT: 60.2 %
NITRITE, URINE: POSITIVE
PDW BLD-RTO: 13.9 % (ref 12.4–15.4)
PH UA: 6.5 (ref 5–8)
PLATELET # BLD: 220 K/UL (ref 135–450)
PMV BLD AUTO: 8.3 FL (ref 5–10.5)
PROTEIN UA: NEGATIVE MG/DL
RBC # BLD: 4.56 M/UL (ref 4–5.2)
RBC UA: 1 /HPF (ref 0–4)
SPECIFIC GRAVITY UA: 1.02 (ref 1–1.03)
UROBILINOGEN, URINE: 1 E.U./DL
WBC # BLD: 8 K/UL (ref 4–11)
WBC UA: 17 /HPF (ref 0–5)

## 2019-09-24 PROCEDURE — 36415 COLL VENOUS BLD VENIPUNCTURE: CPT | Performed by: FAMILY MEDICINE

## 2019-09-24 PROCEDURE — 99214 OFFICE O/P EST MOD 30 MIN: CPT | Performed by: FAMILY MEDICINE

## 2019-09-24 RX ORDER — DICYCLOMINE HCL 20 MG
20 TABLET ORAL EVERY 6 HOURS PRN
Qty: 20 TABLET | Refills: 0 | Status: SHIPPED | OUTPATIENT
Start: 2019-09-24 | End: 2019-10-30 | Stop reason: SDUPTHER

## 2019-09-25 LAB
A/G RATIO: 1.7 (ref 1.1–2.2)
ALBUMIN SERPL-MCNC: 4.2 G/DL (ref 3.4–5)
ALP BLD-CCNC: 125 U/L (ref 40–129)
ALT SERPL-CCNC: 22 U/L (ref 10–40)
ANION GAP SERPL CALCULATED.3IONS-SCNC: 10 MMOL/L (ref 3–16)
AST SERPL-CCNC: 27 U/L (ref 15–37)
BILIRUB SERPL-MCNC: 0.4 MG/DL (ref 0–1)
BUN BLDV-MCNC: 9 MG/DL (ref 7–20)
CALCIUM SERPL-MCNC: 9.5 MG/DL (ref 8.3–10.6)
CHLORIDE BLD-SCNC: 101 MMOL/L (ref 99–110)
CO2: 32 MMOL/L (ref 21–32)
CREAT SERPL-MCNC: 0.6 MG/DL (ref 0.6–1.2)
GFR AFRICAN AMERICAN: >60
GFR NON-AFRICAN AMERICAN: >60
GLOBULIN: 2.5 G/DL
GLUCOSE BLD-MCNC: 106 MG/DL (ref 70–99)
POTASSIUM SERPL-SCNC: 4.9 MMOL/L (ref 3.5–5.1)
SODIUM BLD-SCNC: 143 MMOL/L (ref 136–145)
TOTAL PROTEIN: 6.7 G/DL (ref 6.4–8.2)

## 2019-09-25 ASSESSMENT — ENCOUNTER SYMPTOMS
NAUSEA: 0
ANAL BLEEDING: 0
RECTAL PAIN: 0
CONSTIPATION: 1
VOMITING: 0
BLOOD IN STOOL: 0
CHOKING: 1
DIARRHEA: 0
ABDOMINAL DISTENTION: 1
SHORTNESS OF BREATH: 1
ABDOMINAL PAIN: 1

## 2019-09-26 DIAGNOSIS — R10.9 ABDOMINAL PAIN, UNSPECIFIED ABDOMINAL LOCATION: Primary | ICD-10-CM

## 2019-09-27 ENCOUNTER — NURSE ONLY (OUTPATIENT)
Dept: FAMILY MEDICINE CLINIC | Age: 64
End: 2019-09-27

## 2019-09-27 DIAGNOSIS — R10.9 ABDOMINAL PAIN, UNSPECIFIED ABDOMINAL LOCATION: ICD-10-CM

## 2019-09-27 NOTE — PROGRESS NOTES
Patient was unable to urinate she will take the specimen to [facility] over the weekend

## 2019-09-29 ENCOUNTER — HOSPITAL ENCOUNTER (OUTPATIENT)
Age: 64
Discharge: HOME | End: 2019-09-29

## 2019-09-29 ENCOUNTER — HOSPITAL ENCOUNTER (OUTPATIENT)
Dept: GENERAL RADIOLOGY | Age: 64
Discharge: HOME | End: 2019-09-29

## 2019-09-29 DIAGNOSIS — R10.12 INTERMITTENT LEFT UPPER QUADRANT ABDOMINAL PAIN: ICD-10-CM

## 2019-09-29 PROCEDURE — 87186 SC STD MICRODIL/AGAR DIL: CPT

## 2019-09-29 PROCEDURE — 71046 X-RAY EXAM CHEST 2 VIEWS: CPT

## 2019-09-29 PROCEDURE — 87077 CULTURE AEROBIC IDENTIFY: CPT

## 2019-09-29 PROCEDURE — 87086 URINE CULTURE/COLONY COUNT: CPT

## 2019-10-02 LAB
ORGANISM: ABNORMAL
URINE CULTURE, ROUTINE: ABNORMAL

## 2019-10-02 RX ORDER — SULFAMETHOXAZOLE AND TRIMETHOPRIM 800; 160 MG/1; MG/1
1 TABLET ORAL 2 TIMES DAILY
Qty: 6 TABLET | Refills: 0 | Status: SHIPPED | OUTPATIENT
Start: 2019-10-02 | End: 2019-10-05

## 2019-10-09 ENCOUNTER — OFFICE VISIT (OUTPATIENT)
Dept: FAMILY MEDICINE CLINIC | Age: 64
End: 2019-10-09

## 2019-10-09 VITALS
DIASTOLIC BLOOD PRESSURE: 80 MMHG | BODY MASS INDEX: 43.09 KG/M2 | HEART RATE: 68 BPM | TEMPERATURE: 97.9 F | SYSTOLIC BLOOD PRESSURE: 116 MMHG | OXYGEN SATURATION: 97 % | WEIGHT: 267 LBS

## 2019-10-09 DIAGNOSIS — R10.12 INTERMITTENT LEFT UPPER QUADRANT ABDOMINAL PAIN: Primary | ICD-10-CM

## 2019-10-09 PROCEDURE — 99213 OFFICE O/P EST LOW 20 MIN: CPT | Performed by: FAMILY MEDICINE

## 2019-10-09 ASSESSMENT — ENCOUNTER SYMPTOMS: RESPIRATORY NEGATIVE: 1

## 2019-10-24 ENCOUNTER — HOSPITAL ENCOUNTER (OUTPATIENT)
Dept: CT IMAGING | Age: 64
Discharge: HOME | End: 2019-10-24

## 2019-10-24 DIAGNOSIS — R10.12 INTERMITTENT LEFT UPPER QUADRANT ABDOMINAL PAIN: Primary | ICD-10-CM

## 2019-10-24 DIAGNOSIS — R10.10 PAIN OF UPPER ABDOMEN: ICD-10-CM

## 2019-10-24 DIAGNOSIS — R10.12 INTERMITTENT LEFT UPPER QUADRANT ABDOMINAL PAIN: ICD-10-CM

## 2019-10-24 PROCEDURE — 74177 CT ABD & PELVIS W/CONTRAST: CPT

## 2019-10-24 PROCEDURE — 6360000004 HC RX CONTRAST MEDICATION: Performed by: FAMILY MEDICINE

## 2019-10-24 RX ADMIN — IOHEXOL 50 ML: 240 INJECTION, SOLUTION INTRATHECAL; INTRAVASCULAR; INTRAVENOUS; ORAL at 15:00

## 2019-10-24 RX ADMIN — IOPAMIDOL 75 ML: 755 INJECTION, SOLUTION INTRAVENOUS at 16:13

## 2019-10-29 ENCOUNTER — TELEPHONE (OUTPATIENT)
Dept: FAMILY MEDICINE CLINIC | Age: 64
End: 2019-10-29

## 2019-10-30 DIAGNOSIS — R10.12 INTERMITTENT LEFT UPPER QUADRANT ABDOMINAL PAIN: ICD-10-CM

## 2019-10-30 DIAGNOSIS — R10.9 ABDOMINAL PAIN, UNSPECIFIED ABDOMINAL LOCATION: Primary | ICD-10-CM

## 2019-10-30 RX ORDER — DICYCLOMINE HCL 20 MG
20 TABLET ORAL EVERY 6 HOURS PRN
Qty: 20 TABLET | Refills: 0 | Status: ON HOLD | OUTPATIENT
Start: 2019-10-30 | End: 2019-12-05

## 2019-11-08 ENCOUNTER — INITIAL CONSULT (OUTPATIENT)
Dept: GASTROENTEROLOGY | Age: 64
End: 2019-11-08

## 2019-11-08 VITALS
SYSTOLIC BLOOD PRESSURE: 126 MMHG | BODY MASS INDEX: 42.43 KG/M2 | DIASTOLIC BLOOD PRESSURE: 78 MMHG | WEIGHT: 264 LBS | HEIGHT: 66 IN

## 2019-11-08 DIAGNOSIS — Z11.59 ENCOUNTER FOR HCV SCREENING TEST FOR HIGH RISK PATIENT: ICD-10-CM

## 2019-11-08 DIAGNOSIS — K58.2 IRRITABLE BOWEL SYNDROME WITH BOTH CONSTIPATION AND DIARRHEA: ICD-10-CM

## 2019-11-08 DIAGNOSIS — K76.0 FATTY LIVER: Primary | ICD-10-CM

## 2019-11-08 DIAGNOSIS — R10.12 LUQ PAIN: ICD-10-CM

## 2019-11-08 DIAGNOSIS — Z91.89 ENCOUNTER FOR HCV SCREENING TEST FOR HIGH RISK PATIENT: ICD-10-CM

## 2019-11-08 PROCEDURE — 99204 OFFICE O/P NEW MOD 45 MIN: CPT | Performed by: INTERNAL MEDICINE

## 2019-12-05 ENCOUNTER — HOSPITAL ENCOUNTER (OUTPATIENT)
Age: 64
Discharge: HOME | End: 2019-12-05

## 2019-12-05 ENCOUNTER — HOSPITAL ENCOUNTER (OUTPATIENT)
Age: 64
Setting detail: OUTPATIENT SURGERY
Discharge: HOME | End: 2019-12-05
Attending: INTERNAL MEDICINE | Admitting: INTERNAL MEDICINE

## 2019-12-05 VITALS
WEIGHT: 263 LBS | TEMPERATURE: 97.2 F | SYSTOLIC BLOOD PRESSURE: 151 MMHG | RESPIRATION RATE: 16 BRPM | OXYGEN SATURATION: 97 % | DIASTOLIC BLOOD PRESSURE: 76 MMHG | HEIGHT: 66 IN | BODY MASS INDEX: 42.27 KG/M2 | HEART RATE: 69 BPM

## 2019-12-05 DIAGNOSIS — Z11.59 ENCOUNTER FOR HCV SCREENING TEST FOR HIGH RISK PATIENT: ICD-10-CM

## 2019-12-05 DIAGNOSIS — Z91.89 ENCOUNTER FOR HCV SCREENING TEST FOR HIGH RISK PATIENT: ICD-10-CM

## 2019-12-05 LAB
HAV IGM SER IA-ACNC: NORMAL
HEPATITIS B CORE IGM ANTIBODY: NORMAL
HEPATITIS B SURFACE ANTIGEN INTERPRETATION: NORMAL
HEPATITIS C ANTIBODY INTERPRETATION: NORMAL

## 2019-12-05 PROCEDURE — 43235 EGD DIAGNOSTIC BRUSH WASH: CPT | Performed by: INTERNAL MEDICINE

## 2019-12-05 PROCEDURE — 6360000002 HC RX W HCPCS: Performed by: INTERNAL MEDICINE

## 2019-12-05 PROCEDURE — 36415 COLL VENOUS BLD VENIPUNCTURE: CPT

## 2019-12-05 PROCEDURE — 3609017100 HC EGD: Performed by: INTERNAL MEDICINE

## 2019-12-05 PROCEDURE — 7100000010 HC PHASE II RECOVERY - FIRST 15 MIN: Performed by: INTERNAL MEDICINE

## 2019-12-05 PROCEDURE — 99152 MOD SED SAME PHYS/QHP 5/>YRS: CPT | Performed by: INTERNAL MEDICINE

## 2019-12-05 PROCEDURE — 7100000011 HC PHASE II RECOVERY - ADDTL 15 MIN: Performed by: INTERNAL MEDICINE

## 2019-12-05 PROCEDURE — 91200 LIVER ELASTOGRAPHY: CPT

## 2019-12-05 PROCEDURE — 2709999900 HC NON-CHARGEABLE SUPPLY: Performed by: INTERNAL MEDICINE

## 2019-12-05 PROCEDURE — 91200 LIVER ELASTOGRAPHY: CPT | Performed by: INTERNAL MEDICINE

## 2019-12-05 PROCEDURE — 80074 ACUTE HEPATITIS PANEL: CPT

## 2019-12-05 RX ORDER — FENTANYL CITRATE 50 UG/ML
INJECTION, SOLUTION INTRAMUSCULAR; INTRAVENOUS PRN
Status: DISCONTINUED | OUTPATIENT
Start: 2019-12-05 | End: 2019-12-05 | Stop reason: ALTCHOICE

## 2019-12-05 RX ORDER — MIDAZOLAM HYDROCHLORIDE 5 MG/ML
INJECTION INTRAMUSCULAR; INTRAVENOUS PRN
Status: DISCONTINUED | OUTPATIENT
Start: 2019-12-05 | End: 2019-12-05 | Stop reason: ALTCHOICE

## 2019-12-05 RX ORDER — SODIUM CHLORIDE, SODIUM LACTATE, POTASSIUM CHLORIDE, CALCIUM CHLORIDE 600; 310; 30; 20 MG/100ML; MG/100ML; MG/100ML; MG/100ML
INJECTION, SOLUTION INTRAVENOUS CONTINUOUS
Status: DISCONTINUED | OUTPATIENT
Start: 2019-12-05 | End: 2019-12-05 | Stop reason: HOSPADM

## 2019-12-05 ASSESSMENT — PAIN DESCRIPTION - DESCRIPTORS: DESCRIPTORS: ACHING

## 2019-12-05 ASSESSMENT — PAIN - FUNCTIONAL ASSESSMENT: PAIN_FUNCTIONAL_ASSESSMENT: 0-10

## 2019-12-05 ASSESSMENT — PAIN SCALES - GENERAL: PAINLEVEL_OUTOF10: 0

## 2019-12-09 ENCOUNTER — TELEPHONE (OUTPATIENT)
Dept: GASTROENTEROLOGY | Age: 64
End: 2019-12-09

## 2019-12-26 ENCOUNTER — APPOINTMENT (OUTPATIENT)
Dept: GENERAL RADIOLOGY | Age: 64
End: 2019-12-26

## 2019-12-26 ENCOUNTER — HOSPITAL ENCOUNTER (EMERGENCY)
Age: 64
Discharge: HOME | End: 2019-12-26

## 2019-12-26 VITALS
WEIGHT: 250 LBS | HEART RATE: 73 BPM | RESPIRATION RATE: 18 BRPM | TEMPERATURE: 97.6 F | HEIGHT: 65 IN | OXYGEN SATURATION: 100 % | BODY MASS INDEX: 41.65 KG/M2

## 2019-12-26 DIAGNOSIS — S90.31XA CONTUSION OF RIGHT FOOT, INITIAL ENCOUNTER: Primary | ICD-10-CM

## 2019-12-26 PROCEDURE — 73630 X-RAY EXAM OF FOOT: CPT

## 2019-12-26 PROCEDURE — 99283 EMERGENCY DEPT VISIT LOW MDM: CPT

## 2019-12-26 ASSESSMENT — PAIN DESCRIPTION - LOCATION: LOCATION: FOOT

## 2019-12-26 ASSESSMENT — PAIN DESCRIPTION - PROGRESSION: CLINICAL_PROGRESSION: NOT CHANGED

## 2019-12-26 ASSESSMENT — PAIN DESCRIPTION - DESCRIPTORS: DESCRIPTORS: ACHING;CONSTANT

## 2019-12-26 ASSESSMENT — PAIN SCALES - GENERAL: PAINLEVEL_OUTOF10: 6

## 2019-12-26 ASSESSMENT — PAIN DESCRIPTION - ORIENTATION: ORIENTATION: RIGHT

## 2019-12-26 ASSESSMENT — PAIN DESCRIPTION - FREQUENCY: FREQUENCY: CONTINUOUS

## 2019-12-26 ASSESSMENT — PAIN DESCRIPTION - PAIN TYPE: TYPE: ACUTE PAIN

## 2019-12-26 ASSESSMENT — PAIN DESCRIPTION - ONSET: ONSET: SUDDEN

## 2020-01-02 ENCOUNTER — OFFICE VISIT (OUTPATIENT)
Dept: FAMILY MEDICINE CLINIC | Age: 65
End: 2020-01-02

## 2020-01-02 VITALS
BODY MASS INDEX: 43.43 KG/M2 | OXYGEN SATURATION: 97 % | DIASTOLIC BLOOD PRESSURE: 90 MMHG | SYSTOLIC BLOOD PRESSURE: 136 MMHG | HEART RATE: 88 BPM | WEIGHT: 261 LBS

## 2020-01-02 PROBLEM — K74.60 LIVER CIRRHOSIS (HCC): Status: ACTIVE | Noted: 2019-12-18

## 2020-01-02 LAB
BILIRUBIN, POC: NORMAL
BLOOD URINE, POC: NORMAL
CLARITY, POC: NORMAL
COLOR, POC: NORMAL
GLUCOSE URINE, POC: NEGATIVE
KETONES, POC: NORMAL
LEUKOCYTE EST, POC: NORMAL
NITRITE, POC: POSITIVE
PH, POC: 5.5
PROTEIN, POC: >=300
SPECIFIC GRAVITY, POC: 1.02
UROBILINOGEN, POC: 0.2

## 2020-01-02 PROCEDURE — 81002 URINALYSIS NONAUTO W/O SCOPE: CPT | Performed by: NURSE PRACTITIONER

## 2020-01-02 PROCEDURE — 99213 OFFICE O/P EST LOW 20 MIN: CPT | Performed by: NURSE PRACTITIONER

## 2020-01-02 RX ORDER — CIPROFLOXACIN 250 MG/1
250 TABLET, FILM COATED ORAL 2 TIMES DAILY
Qty: 6 TABLET | Refills: 0 | Status: SHIPPED | OUTPATIENT
Start: 2020-01-02 | End: 2020-01-05

## 2020-01-02 ASSESSMENT — ENCOUNTER SYMPTOMS
PHOTOPHOBIA: 0
TROUBLE SWALLOWING: 0
SHORTNESS OF BREATH: 0
VOICE CHANGE: 0
COUGH: 0
NAUSEA: 0
EYE PAIN: 0
CHEST TIGHTNESS: 0
EYE REDNESS: 0
ABDOMINAL PAIN: 1
SORE THROAT: 0
BACK PAIN: 0
ANAL BLEEDING: 0
CHOKING: 0
SINUS PRESSURE: 0
CONSTIPATION: 0
BLOOD IN STOOL: 0
EYE DISCHARGE: 0
COLOR CHANGE: 0
ABDOMINAL DISTENTION: 0
RHINORRHEA: 0
EYE ITCHING: 0
STRIDOR: 0
WHEEZING: 0
SINUS PAIN: 0
DIARRHEA: 0
VOMITING: 0

## 2020-01-02 NOTE — PROGRESS NOTES
Chief Complaint   Patient presents with    Abdominal Pain     burning with urination, frequency for 4 days        BP (!) 136/90   Pulse 88   Wt 261 lb (118.4 kg)   SpO2 97%   Breastfeeding? No   BMI 43.43 kg/m²     HPI:  Brandee Echeverria is a 62 y.o. (: 1955) here today   for   She is a smoker from 2 packs a day to less than a pack a day. She has been smoking since she was 25years old. We discussed the risks of her smoking history and bladder cancer. She was just diagnosed with Cirrhosis. She was not fond of Dr. Jasvir Lozano and would like a second opinion so we discussed a new referral to Dr. Fede Alvarado. We also agreed she needs to be evaluated by a urologist. Soumya Huggins sis a priority due to the pain she is having in her bladder. She voiced understanding and she had a friend recently die of bladder cancer. She will follow up with these specialists. She voiced that she would like to witch providers and will do this on her way out. Patient's medications, allergies, past medical, surgical, social and family histories were reviewed and updated asappropriate. ROS:  Review of Systems   Constitutional: Negative for activity change, appetite change, chills, diaphoresis, fatigue, fever and unexpected weight change. HENT: Negative for congestion, ear discharge, ear pain, hearing loss, nosebleeds, postnasal drip, rhinorrhea, sinus pressure, sinus pain, sneezing, sore throat, tinnitus, trouble swallowing and voice change. Eyes: Negative for photophobia, pain, discharge, redness and itching. Respiratory: Negative for cough, choking, chest tightness, shortness of breath, wheezing and stridor. Cardiovascular: Negative for chest pain, palpitations and leg swelling. Gastrointestinal: Positive for abdominal pain. Negative for abdominal distention, anal bleeding, blood in stool, constipation, diarrhea, nausea and vomiting. Endocrine: Negative for cold intolerance, heat intolerance, polydipsia and polyuria. Genitourinary: Positive for dysuria and frequency. Negative for difficulty urinating, enuresis, flank pain, hematuria and urgency. Musculoskeletal: Negative for back pain, gait problem, joint swelling, neck pain and neck stiffness. Skin: Negative for color change, pallor, rash and wound. Allergic/Immunologic: Negative for environmental allergies and food allergies. Neurological: Negative for dizziness, tremors, syncope, speech difficulty, weakness, light-headedness, numbness and headaches. Hematological: Negative for adenopathy. Does not bruise/bleed easily. Psychiatric/Behavioral: Negative for agitation, behavioral problems, confusion, decreased concentration, dysphoric mood, hallucinations, self-injury, sleep disturbance and suicidal ideas. The patient is not nervous/anxious and is not hyperactive. Prior to Visit Medications    Medication Sig Taking? Authorizing Provider   ciprofloxacin (CIPRO) 250 MG tablet Take 1 tablet by mouth 2 times daily for 3 days Yes Matthieu Mccloud, APRN - CNP   citalopram (CELEXA) 40 MG tablet TAKE (1) TABLET DAILY Yes Kiarra Campos MD   atorvastatin (LIPITOR) 10 MG tablet TAKE (1) TABLET DAILY Yes Kiarra Campos MD   clonazePAM (KLONOPIN) 0.5 MG tablet TAKE 1 TABLET TWICE DAILY Yes Kiarra Campos MD   mirtazapine (REMERON) 15 MG tablet TAKE 1 TABLET NIGHTLY Yes Kiarra Campos MD   potassium chloride (KLOR-CON M) 20 MEQ extended release tablet Take 1 tablet by mouth daily Yes Kiarra Campos MD   furosemide (LASIX) 20 MG tablet TAKE (1) TABLET DAILY Yes Kiarra Campos MD   cyclobenzaprine (FLEXERIL) 10 MG tablet TAKE 1 TABLET AT BEDTIME AS NEEDED Yes Kiarra Campos MD   aspirin 81 MG tablet Take 81 mg by mouth daily Yes Historical Provider, MD   Multiple Vitamins-Minerals (THERAPEUTIC MULTIVITAMIN-MINERALS) tablet Take 1 tablet by mouth daily.  Yes Historical Provider, MD       Allergies   Allergen Reactions    Lisinopril [facility])    - Amanda Mobley MD, Gastroenterology, Tristan Listen    She is thinking of switching to out office. She will follow up to establish in another appointment due to her extensive medical history. Follow up if symptoms do not improve or worsen. If the patient becomes short of breath go straight to the ER or call 911.

## 2020-01-02 NOTE — LETTER
[address]  [address] [address] [facility]  Phone: [phone]  Fax: [phone]    FARHAT Velez CNP        January 2, 2020     Patient: Arthur Baker   YOB: 1955   Date of Visit: 1/2/2020       To Whom It May Concern: It is my medical opinion that Courtney Billingsley 1/6/20. If you have any questions or concerns, please don't hesitate to call.     Sincerely,        FARHAT Velez CNP

## 2020-01-05 LAB
ORGANISM: ABNORMAL
URINE CULTURE, ROUTINE: ABNORMAL

## 2020-01-15 RX ORDER — TRIMETHOPRIM 100 MG/1
100 TABLET ORAL DAILY
COMMUNITY
End: 2020-07-29 | Stop reason: ALTCHOICE

## 2020-01-15 NOTE — PROGRESS NOTES
Obstructive Sleep Apnea (LIANG) Screening     Patient:  Miracle Red    YOB: 1955      Medical Record #:  [id number]                     Date:  1/15/2020     1. Are you a loud and/or regular snorer? []  Yes       [x] No    2. Have you been observed to gasp or stop breathing during sleep? []  Yes       [x] No    3. Do you feel tired or groggy upon awakening or do you awaken with a headache?           []  Yes       [x] No    4. Are you often tired or fatigued during the wake time hours? []  Yes       [x] No    5. Do you fall asleep sitting, reading, watching TV or driving? []  Yes       [x] No    6. Do you often have problems with memory or concentration? []  Yes       [x] No    **If patient's score is ? 3 they are considered high risk for LIANG. Notify the anesthesiologist of the high risk and document in focus note. Note:  If the patient's BMI is more than 35 kg m¯² , has neck circumference > 40 cm, and/or high blood pressure the risk is greater (© American Sleep Apnea Association, 2006).

## 2020-01-17 ENCOUNTER — OFFICE VISIT (OUTPATIENT)
Dept: FAMILY MEDICINE CLINIC | Age: 65
End: 2020-01-17

## 2020-01-17 VITALS
BODY MASS INDEX: 41.95 KG/M2 | DIASTOLIC BLOOD PRESSURE: 70 MMHG | WEIGHT: 261 LBS | TEMPERATURE: 97.4 F | HEIGHT: 66 IN | RESPIRATION RATE: 20 BRPM | OXYGEN SATURATION: 99 % | HEART RATE: 78 BPM | SYSTOLIC BLOOD PRESSURE: 114 MMHG

## 2020-01-17 LAB
ALBUMIN SERPL-MCNC: 4.1 G/DL (ref 3.4–5)
ANION GAP SERPL CALCULATED.3IONS-SCNC: 12 MMOL/L (ref 3–16)
BILIRUBIN URINE: NEGATIVE
BLOOD, URINE: NEGATIVE
BUN BLDV-MCNC: 13 MG/DL (ref 7–20)
CALCIUM SERPL-MCNC: 9.3 MG/DL (ref 8.3–10.6)
CHLORIDE BLD-SCNC: 99 MMOL/L (ref 99–110)
CLARITY: ABNORMAL
CO2: 31 MMOL/L (ref 21–32)
COLOR: YELLOW
CREAT SERPL-MCNC: 1.1 MG/DL (ref 0.6–1.2)
EPITHELIAL CELLS, UA: 4 /HPF (ref 0–5)
GFR AFRICAN AMERICAN: >60
GFR NON-AFRICAN AMERICAN: 50
GLUCOSE BLD-MCNC: 130 MG/DL (ref 70–99)
GLUCOSE URINE: NEGATIVE MG/DL
HCT VFR BLD CALC: 42.6 % (ref 36–48)
HEMOGLOBIN: 14.4 G/DL (ref 12–16)
HYALINE CASTS: 1 /LPF (ref 0–8)
INR BLD: 1.02 (ref 0.86–1.14)
KETONES, URINE: NEGATIVE MG/DL
LEUKOCYTE ESTERASE, URINE: NEGATIVE
MCH RBC QN AUTO: 30.4 PG (ref 26–34)
MCHC RBC AUTO-ENTMCNC: 33.9 G/DL (ref 31–36)
MCV RBC AUTO: 89.5 FL (ref 80–100)
MICROSCOPIC EXAMINATION: YES
NITRITE, URINE: NEGATIVE
PDW BLD-RTO: 13.6 % (ref 12.4–15.4)
PH UA: 6 (ref 5–8)
PHOSPHORUS: 3.7 MG/DL (ref 2.5–4.9)
PLATELET # BLD: 211 K/UL (ref 135–450)
PMV BLD AUTO: 8.4 FL (ref 5–10.5)
POTASSIUM SERPL-SCNC: 4 MMOL/L (ref 3.5–5.1)
PROTEIN UA: ABNORMAL MG/DL
PROTHROMBIN TIME: 11.8 SEC (ref 10–13.2)
RBC # BLD: 4.76 M/UL (ref 4–5.2)
RBC UA: 2 /HPF (ref 0–4)
SODIUM BLD-SCNC: 142 MMOL/L (ref 136–145)
SPECIFIC GRAVITY UA: 1.03 (ref 1–1.03)
URINE TYPE: ABNORMAL
UROBILINOGEN, URINE: 0.2 E.U./DL
WBC # BLD: 8.6 K/UL (ref 4–11)
WBC UA: 2 /HPF (ref 0–5)

## 2020-01-17 PROCEDURE — 93000 ELECTROCARDIOGRAM COMPLETE: CPT | Performed by: FAMILY MEDICINE

## 2020-01-17 PROCEDURE — 36415 COLL VENOUS BLD VENIPUNCTURE: CPT | Performed by: FAMILY MEDICINE

## 2020-01-17 PROCEDURE — 99214 OFFICE O/P EST MOD 30 MIN: CPT | Performed by: FAMILY MEDICINE

## 2020-01-17 NOTE — PROGRESS NOTES
success if you take medicine as well as get counseling or join a cessation program.  Some of the changes you feel when you first quit tobacco are uncomfortable. Your body will miss the nicotine at first, and you may feel short-tempered and grumpy. You may have trouble sleeping or concentrating. Medicine can help you deal with these symptoms. You may struggle with changing your smoking habits and rituals. The last step is the tricky one: Be prepared for the smoking urge to continue for a time. This is a lot to deal with, but keep at it. You will feel better. Follow-up care is a key part of your treatment and safety. Be sure to make and go to all appointments, and call your doctor if you are having problems. It's also a good idea to know your test results and keep a list of the medicines you take. How can you care for yourself at home? · Ask your family, friends, and coworkers for support. You have a better chance of quitting if you have help and support. · Join a support group, such as Nicotine Anonymous, for people who are trying to quit smoking. · Consider signing up for a smoking cessation program, such as the American Lung Association's Freedom from Smoking program.  · Get text messaging support. Go to the website at www.smokefree. gov to sign up for the [facility] program.  · Set a quit date. Pick your date carefully so that it is not right in the middle of a big deadline or stressful time. Once you quit, do not even take a puff. Get rid of all ashtrays and lighters after your last cigarette. Clean your house and your clothes so that they do not smell of smoke. · Learn how to be a nonsmoker. Think about ways you can avoid those things that make you reach for a cigarette. ? Avoid situations that put you at greatest risk for smoking. For some people, it is hard to have a drink with friends without smoking. For others, they might skip a coffee break with coworkers who smoke. ? Change your daily routine. Take a different route to work or eat a meal in a different place. · Cut down on stress. Calm yourself or release tension by doing an activity you enjoy, such as reading a book, taking a hot bath, or gardening. · Talk to your doctor or pharmacist about nicotine replacement therapy, which replaces the nicotine in your body. You still get nicotine but you do not use tobacco. Nicotine replacement products help you slowly reduce the amount of nicotine you need. These products come in several forms, many of them available over-the-counter:  ? Nicotine patches  ? Nicotine gum and lozenges  ? Nicotine inhaler  · Ask your doctor about bupropion (Wellbutrin) or varenicline (Chantix), which are prescription medicines. They do not contain nicotine. They help you by reducing withdrawal symptoms, such as stress and anxiety. · Some people find hypnosis, acupuncture, and massage helpful for ending the smoking habit. · Eat a healthy diet and get regular exercise. Having healthy habits will help your body move past its craving for nicotine. · Be prepared to keep trying. Most people are not successful the first few times they try to quit. Do not get mad at yourself if you smoke again. Make a list of things you learned and think about when you want to try again, such as next week, next month, or next year. Where can you learn more? Go to https://Moment.me.EnergyChest. org and sign in to your [employer] account. Enter H099 in the [facility] box to learn more about \"Stopping Smoking: Care Instructions. \"     If you do not have an account, please click on the \"Sign Up Now\" link. Current as of: July 4, 2019  Content Version: 12.3  © 1961-3246 Healthwise, [employer]. Care instructions adapted under license by [facility] ([facility]).  If you have questions about a medical condition or this instruction, always ask your healthcare professional. [address] any warranty or liability for your use of this

## 2020-01-22 ENCOUNTER — HOSPITAL ENCOUNTER (OUTPATIENT)
Age: 65
Setting detail: OUTPATIENT SURGERY
Discharge: HOME | End: 2020-01-22
Attending: UROLOGY | Admitting: UROLOGY

## 2020-01-22 ENCOUNTER — ANESTHESIA (OUTPATIENT)
Dept: OPERATING ROOM | Age: 65
End: 2020-01-22

## 2020-01-22 ENCOUNTER — ANESTHESIA EVENT (OUTPATIENT)
Dept: OPERATING ROOM | Age: 65
End: 2020-01-22

## 2020-01-22 VITALS
TEMPERATURE: 98.6 F | DIASTOLIC BLOOD PRESSURE: 63 MMHG | RESPIRATION RATE: 14 BRPM | OXYGEN SATURATION: 96 % | SYSTOLIC BLOOD PRESSURE: 99 MMHG

## 2020-01-22 VITALS
OXYGEN SATURATION: 94 % | WEIGHT: 259 LBS | DIASTOLIC BLOOD PRESSURE: 73 MMHG | TEMPERATURE: 98.4 F | HEART RATE: 69 BPM | BODY MASS INDEX: 43.15 KG/M2 | HEIGHT: 65 IN | SYSTOLIC BLOOD PRESSURE: 119 MMHG | RESPIRATION RATE: 14 BRPM

## 2020-01-22 PROCEDURE — 2580000003 HC RX 258: Performed by: ANESTHESIOLOGY

## 2020-01-22 PROCEDURE — 2500000003 HC RX 250 WO HCPCS

## 2020-01-22 PROCEDURE — 3700000000 HC ANESTHESIA ATTENDED CARE: Performed by: UROLOGY

## 2020-01-22 PROCEDURE — 6360000002 HC RX W HCPCS

## 2020-01-22 PROCEDURE — 3700000001 HC ADD 15 MINUTES (ANESTHESIA): Performed by: UROLOGY

## 2020-01-22 PROCEDURE — 7100000001 HC PACU RECOVERY - ADDTL 15 MIN: Performed by: UROLOGY

## 2020-01-22 PROCEDURE — 3600000004 HC SURGERY LEVEL 4 BASE: Performed by: UROLOGY

## 2020-01-22 PROCEDURE — 2709999900 HC NON-CHARGEABLE SUPPLY: Performed by: UROLOGY

## 2020-01-22 PROCEDURE — 7100000011 HC PHASE II RECOVERY - ADDTL 15 MIN: Performed by: UROLOGY

## 2020-01-22 PROCEDURE — 3600000014 HC SURGERY LEVEL 4 ADDTL 15MIN: Performed by: UROLOGY

## 2020-01-22 PROCEDURE — 7100000010 HC PHASE II RECOVERY - FIRST 15 MIN: Performed by: UROLOGY

## 2020-01-22 PROCEDURE — 2580000003 HC RX 258: Performed by: UROLOGY

## 2020-01-22 PROCEDURE — 7100000000 HC PACU RECOVERY - FIRST 15 MIN: Performed by: UROLOGY

## 2020-01-22 PROCEDURE — 6360000002 HC RX W HCPCS: Performed by: UROLOGY

## 2020-01-22 RX ORDER — SODIUM CHLORIDE 0.9 % (FLUSH) 0.9 %
10 SYRINGE (ML) INJECTION PRN
Status: DISCONTINUED | OUTPATIENT
Start: 2020-01-22 | End: 2020-01-22 | Stop reason: HOSPADM

## 2020-01-22 RX ORDER — PROPOFOL 10 MG/ML
INJECTION, EMULSION INTRAVENOUS PRN
Status: DISCONTINUED | OUTPATIENT
Start: 2020-01-22 | End: 2020-01-22 | Stop reason: SDUPTHER

## 2020-01-22 RX ORDER — ONDANSETRON 2 MG/ML
INJECTION INTRAMUSCULAR; INTRAVENOUS PRN
Status: DISCONTINUED | OUTPATIENT
Start: 2020-01-22 | End: 2020-01-22 | Stop reason: SDUPTHER

## 2020-01-22 RX ORDER — LIDOCAINE HYDROCHLORIDE 20 MG/ML
INJECTION, SOLUTION INFILTRATION; PERINEURAL PRN
Status: DISCONTINUED | OUTPATIENT
Start: 2020-01-22 | End: 2020-01-22 | Stop reason: SDUPTHER

## 2020-01-22 RX ORDER — ONDANSETRON 2 MG/ML
4 INJECTION INTRAMUSCULAR; INTRAVENOUS EVERY 10 MIN PRN
Status: DISCONTINUED | OUTPATIENT
Start: 2020-01-22 | End: 2020-01-22 | Stop reason: HOSPADM

## 2020-01-22 RX ORDER — MAGNESIUM HYDROXIDE 1200 MG/15ML
LIQUID ORAL PRN
Status: DISCONTINUED | OUTPATIENT
Start: 2020-01-22 | End: 2020-01-22 | Stop reason: ALTCHOICE

## 2020-01-22 RX ORDER — SODIUM CHLORIDE, SODIUM LACTATE, POTASSIUM CHLORIDE, CALCIUM CHLORIDE 600; 310; 30; 20 MG/100ML; MG/100ML; MG/100ML; MG/100ML
INJECTION, SOLUTION INTRAVENOUS CONTINUOUS
Status: DISCONTINUED | OUTPATIENT
Start: 2020-01-22 | End: 2020-01-22 | Stop reason: HOSPADM

## 2020-01-22 RX ORDER — PHENAZOPYRIDINE HYDROCHLORIDE 200 MG/1
200 TABLET, FILM COATED ORAL 3 TIMES DAILY PRN
Qty: 10 TABLET | Refills: 1 | Status: SHIPPED | OUTPATIENT
Start: 2020-01-22 | End: 2020-01-25

## 2020-01-22 RX ORDER — OXYCODONE HYDROCHLORIDE AND ACETAMINOPHEN 5; 325 MG/1; MG/1
1 TABLET ORAL PRN
Status: DISCONTINUED | OUTPATIENT
Start: 2020-01-22 | End: 2020-01-22 | Stop reason: HOSPADM

## 2020-01-22 RX ORDER — LABETALOL HYDROCHLORIDE 5 MG/ML
5 INJECTION, SOLUTION INTRAVENOUS EVERY 10 MIN PRN
Status: DISCONTINUED | OUTPATIENT
Start: 2020-01-22 | End: 2020-01-22 | Stop reason: HOSPADM

## 2020-01-22 RX ORDER — LIDOCAINE HYDROCHLORIDE 10 MG/ML
0.3 INJECTION, SOLUTION EPIDURAL; INFILTRATION; INTRACAUDAL; PERINEURAL
Status: DISCONTINUED | OUTPATIENT
Start: 2020-01-22 | End: 2020-01-22 | Stop reason: HOSPADM

## 2020-01-22 RX ORDER — OXYCODONE HYDROCHLORIDE AND ACETAMINOPHEN 5; 325 MG/1; MG/1
2 TABLET ORAL PRN
Status: DISCONTINUED | OUTPATIENT
Start: 2020-01-22 | End: 2020-01-22 | Stop reason: HOSPADM

## 2020-01-22 RX ORDER — HYDRALAZINE HYDROCHLORIDE 20 MG/ML
5 INJECTION INTRAMUSCULAR; INTRAVENOUS EVERY 10 MIN PRN
Status: DISCONTINUED | OUTPATIENT
Start: 2020-01-22 | End: 2020-01-22 | Stop reason: HOSPADM

## 2020-01-22 RX ORDER — SODIUM CHLORIDE 0.9 % (FLUSH) 0.9 %
10 SYRINGE (ML) INJECTION EVERY 12 HOURS SCHEDULED
Status: DISCONTINUED | OUTPATIENT
Start: 2020-01-22 | End: 2020-01-22 | Stop reason: HOSPADM

## 2020-01-22 RX ORDER — DEXAMETHASONE SODIUM PHOSPHATE 4 MG/ML
INJECTION, SOLUTION INTRA-ARTICULAR; INTRALESIONAL; INTRAMUSCULAR; INTRAVENOUS; SOFT TISSUE PRN
Status: DISCONTINUED | OUTPATIENT
Start: 2020-01-22 | End: 2020-01-22 | Stop reason: SDUPTHER

## 2020-01-22 RX ADMIN — CEFAZOLIN SODIUM 2 G: 10 INJECTION, POWDER, FOR SOLUTION INTRAVENOUS at 10:12

## 2020-01-22 RX ADMIN — ONDANSETRON 4 MG: 2 INJECTION INTRAMUSCULAR; INTRAVENOUS at 10:12

## 2020-01-22 RX ADMIN — DEXAMETHASONE SODIUM PHOSPHATE 4 MG: 4 INJECTION, SOLUTION INTRAMUSCULAR; INTRAVENOUS at 10:12

## 2020-01-22 RX ADMIN — LIDOCAINE HYDROCHLORIDE 100 MG: 20 INJECTION, SOLUTION INFILTRATION; PERINEURAL at 10:12

## 2020-01-22 RX ADMIN — SODIUM CHLORIDE, POTASSIUM CHLORIDE, SODIUM LACTATE AND CALCIUM CHLORIDE: 600; 310; 30; 20 INJECTION, SOLUTION INTRAVENOUS at 09:45

## 2020-01-22 RX ADMIN — PROPOFOL 200 MG: 10 INJECTION, EMULSION INTRAVENOUS at 10:12

## 2020-01-22 ASSESSMENT — PULMONARY FUNCTION TESTS
PIF_VALUE: 1
PIF_VALUE: 3
PIF_VALUE: 0
PIF_VALUE: 1
PIF_VALUE: 15
PIF_VALUE: 0
PIF_VALUE: 21
PIF_VALUE: 28
PIF_VALUE: 1
PIF_VALUE: 12
PIF_VALUE: 10
PIF_VALUE: 12
PIF_VALUE: 13
PIF_VALUE: 13
PIF_VALUE: 4
PIF_VALUE: 13
PIF_VALUE: 15
PIF_VALUE: 12
PIF_VALUE: 13
PIF_VALUE: 13
PIF_VALUE: 0
PIF_VALUE: 3
PIF_VALUE: 3

## 2020-01-22 ASSESSMENT — LIFESTYLE VARIABLES: SMOKING_STATUS: 1

## 2020-01-22 ASSESSMENT — PAIN - FUNCTIONAL ASSESSMENT: PAIN_FUNCTIONAL_ASSESSMENT: 0-10

## 2020-01-22 ASSESSMENT — PAIN SCALES - GENERAL: PAINLEVEL_OUTOF10: 0

## 2020-01-22 NOTE — PROGRESS NOTES
Received to [facility] post procedure. Denies pain. Vitals stable on room air. Tolerated ice chips well. Family called to bedside.

## 2020-01-22 NOTE — ANESTHESIA PRE PROCEDURE
Smokeless tobacco: Never Used   Substance Use Topics    Alcohol use: No     Alcohol/week: 0.0 standard drinks     Comment: rare beer maybe 1-2 year                                 Ready to quit: Not Answered  Counseling given: Yes      Vital Signs (Current):   Vitals:    01/15/20 1129   Weight: 261 lb (118.4 kg)   Height: 5' 5\" (1.651 m)                                              BP Readings from Last 3 Encounters:   01/17/20 114/70   01/02/20 (!) 136/90   12/05/19 (!) 151/76       NPO Status:                                                                                 BMI:   Wt Readings from Last 3 Encounters:   01/15/20 261 lb (118.4 kg)   01/17/20 261 lb (118.4 kg)   01/02/20 261 lb (118.4 kg)     Body mass index is 43.43 kg/m². CBC:   Lab Results   Component Value Date    WBC 8.6 01/17/2020    RBC 4.76 01/17/2020    HGB 14.4 01/17/2020    HCT 42.6 01/17/2020    MCV 89.5 01/17/2020    RDW 13.6 01/17/2020     01/17/2020       CMP:   Lab Results   Component Value Date     01/17/2020    K 4.0 01/17/2020    CL 99 01/17/2020    CO2 31 01/17/2020    BUN 13 01/17/2020    CREATININE 1.1 01/17/2020    GFRAA >60 01/17/2020    GFRAA >60 08/17/2012    AGRATIO 1.7 09/24/2019    LABGLOM 50 01/17/2020    GLUCOSE 130 01/17/2020    PROT 6.7 09/24/2019    PROT 7.2 08/17/2012    CALCIUM 9.3 01/17/2020    BILITOT 0.4 09/24/2019    ALKPHOS 125 09/24/2019    AST 27 09/24/2019    ALT 22 09/24/2019       POC Tests: No results for input(s): POCGLU, POCNA, POCK, POCCL, POCBUN, POCHEMO, POCHCT in the last 72 hours.     Coags:   Lab Results   Component Value Date    PROTIME 11.8 01/17/2020    INR 1.02 01/17/2020       HCG (If Applicable): No results found for: PREGTESTUR, PREGSERUM, HCG, HCGQUANT     ABGs: No results found for: PHART, PO2ART, SGE2ECJ, KTB3YHI, BEART, B4MOJOOV     Type & Screen (If Applicable):  No results found for: LABABO, [address]    Anesthesia Evaluation  Patient summary reviewed and Nursing notes

## 2020-01-22 NOTE — BRIEF OP NOTE
Brief Postoperative Note  ______________________________________________________________    Patient: Jose Barrientos  YOB: 1955  MRN: [id number]  Date of Procedure: 1/22/2020    Pre-Op Diagnosis: GROSS HEMATURIA    Post-Op Diagnosis: Urethral stenosis       Procedure(s):  CYSTOSCOPY, URETHRAL DILATION    Anesthesia: General    Surgeon(s):  José Rothman MD    Estimated Blood Loss (mL): less than 50     Complications: None    Specimens:   * No specimens in log *    Implants:  * No implants in log *      Drains: * No LDAs found *    Findings: severe urethral stenosis - dilated to 26F  Negative cysto otherwise    José Rothman MD  Date: 1/22/2020  Time: 10:31 AM

## 2020-01-29 ENCOUNTER — OFFICE VISIT (OUTPATIENT)
Dept: FAMILY MEDICINE CLINIC | Age: 65
End: 2020-01-29

## 2020-01-29 VITALS
HEART RATE: 81 BPM | DIASTOLIC BLOOD PRESSURE: 82 MMHG | OXYGEN SATURATION: 97 % | SYSTOLIC BLOOD PRESSURE: 136 MMHG | BODY MASS INDEX: 43.43 KG/M2 | WEIGHT: 261 LBS

## 2020-01-29 PROCEDURE — 99214 OFFICE O/P EST MOD 30 MIN: CPT | Performed by: NURSE PRACTITIONER

## 2020-01-29 RX ORDER — FUROSEMIDE 20 MG/1
TABLET ORAL
Qty: 30 TABLET | Refills: 5 | Status: SHIPPED | OUTPATIENT
Start: 2020-01-29 | End: 2020-07-29 | Stop reason: SDUPTHER

## 2020-01-29 RX ORDER — MIRTAZAPINE 15 MG/1
7.5 TABLET, FILM COATED ORAL NIGHTLY
Qty: 30 TABLET | Refills: 0 | Status: SHIPPED | OUTPATIENT
Start: 2020-01-29 | End: 2020-02-28

## 2020-01-29 RX ORDER — AMOXICILLIN 875 MG/1
875 TABLET, COATED ORAL 2 TIMES DAILY
Qty: 20 TABLET | Refills: 0 | Status: SHIPPED | OUTPATIENT
Start: 2020-01-29 | End: 2020-02-08

## 2020-01-29 ASSESSMENT — ENCOUNTER SYMPTOMS
CONSTIPATION: 0
NAUSEA: 0
WHEEZING: 0
SINUS PRESSURE: 0
SHORTNESS OF BREATH: 0
ABDOMINAL PAIN: 0
CHOKING: 0
COUGH: 0
EYE REDNESS: 0
VOICE CHANGE: 0
EYE ITCHING: 0
CHEST TIGHTNESS: 0
VOMITING: 0
SORE THROAT: 0
COLOR CHANGE: 0
SINUS PAIN: 0
BACK PAIN: 0
DIARRHEA: 0
STRIDOR: 0
EYE DISCHARGE: 0
TROUBLE SWALLOWING: 0
PHOTOPHOBIA: 0
RHINORRHEA: 0
EYE PAIN: 0
BLOOD IN STOOL: 0

## 2020-01-29 NOTE — PROGRESS NOTES
diarrhea, nausea and vomiting. Endocrine: Negative for cold intolerance, heat intolerance, polydipsia and polyuria. Genitourinary: Negative for difficulty urinating, dysuria, enuresis, flank pain, frequency, hematuria and urgency. Musculoskeletal: Negative for back pain, gait problem, joint swelling, neck pain and neck stiffness. Skin: Negative for color change, pallor, rash and wound. Allergic/Immunologic: Negative for environmental allergies and food allergies. Neurological: Negative for dizziness, tremors, syncope, speech difficulty, weakness, light-headedness, numbness and headaches. Hematological: Negative for adenopathy. Does not bruise/bleed easily. Psychiatric/Behavioral: Positive for sleep disturbance. Negative for agitation, behavioral problems, confusion, decreased concentration, dysphoric mood, hallucinations, self-injury and suicidal ideas. The patient is nervous/anxious. The patient is not hyperactive. Prior to Visit Medications    Medication Sig Taking? Authorizing Provider   furosemide (LASIX) 20 MG tablet TAKE (1) TABLET DAILY Yes FARHAT Saldivar CNP   mirtazapine (REMERON) 15 MG tablet Take 0.5 tablets by mouth nightly Yes FARHAT Saldivar CNP   amoxicillin (AMOXIL) 875 MG tablet Take 1 tablet by mouth 2 times daily for 10 days Yes FARHAT Saldivar CNP   trimethoprim (TRIMPEX) 100 MG tablet Take 100 mg by mouth daily Yes Historical Provider, MD   citalopram (CELEXA) 40 MG tablet TAKE (1) TABLET DAILY  Patient taking differently: Take 40 mg by mouth nightly  Yes Adrian Pena MD   atorvastatin (LIPITOR) 10 MG tablet TAKE (1) TABLET DAILY  Patient taking differently: Take 10 mg by mouth nightly TAKE (1) TABLET DAILY Yes Adrian Pena MD   clonazePAM (KLONOPIN) 0.5 MG tablet TAKE 1 TABLET TWICE DAILY Yes Adrian Pena MD   Multiple Vitamins-Minerals (THERAPEUTIC MULTIVITAMIN-MINERALS) tablet Take 1 tablet by mouth daily.  Yes Historical Provider, MD   potassium chloride (KLOR-CON M) 20 MEQ extended release tablet Take 1 tablet by mouth daily  Patient not taking: Reported on 1/29/2020  Janet Rahman MD        Allergies   Allergen Reactions    Lisinopril      Angioedema 1/27/2017    Trazodone And Nefazodone Other (See Comments)     nightmares    Zithromax [Azithromycin Dihydrate] Nausea And Vomiting       Past Medical History:   Diagnosis Date    Anxiety     Arthritis     Depression     Disc degeneration 2014    Frequent UTI     Hyperlipidemia     Hypertension     Liver cirrhosis ([facility] [address] 12/18/2019       Past Surgical History:   Procedure Laterality Date    APPENDECTOMY      was 16 yrs old    CARPAL TUNNEL RELEASE      CHOLECYSTECTOMY      COLONOSCOPY      CYSTOSCOPY N/A 1/22/2020    CYSTOSCOPY, URETHRAL DILATION performed by Shayy Leon MD at [address] Right 4/4/12    FOOT SURGERY Right 06/12/13    FIRST METATARSAL PHALANGEAL JOINT IMPLANTATION RIGHT FOOT    HYSTERECTOMY  1980    complete and 1982;endomwtriosis;    ROTATOR CUFF REPAIR      1988    TUBAL LIGATION  1984    ULNAR TUNNEL RELEASE      UPPER GASTROINTESTINAL ENDOSCOPY N/A 12/5/2019    EGD (10:00) **FIBROSCAN TOO** performed by Ulysses Kaplan MD at [facility] History     Socioeconomic History    Marital status:      Spouse name: Not on file    Number of children: 4    Years of education: Not on file    Highest education level: Not on file   Occupational History    Not on file   Social Needs    Financial resource strain: Not on file    Food insecurity:     Worry: Not on file     Inability: Not on file    Transportation needs:     Medical: Not on file     Non-medical: Not on file   Tobacco Use    Smoking status: Current Every Day Smoker     Packs/day: 1.00     Years: 40.00     Pack years: 40.00     Types: Cigarettes    Smokeless tobacco: Never Used   Substance and Sexual Activity    Alcohol use:  No Alcohol/week: 0.0 standard drinks     Comment: rare beer maybe 1-2 year     Drug use: No    Sexual activity: Yes     Partners: Male   Lifestyle    Physical activity:     Days per week: Not on file     Minutes per session: Not on file    Stress: Not on file   Relationships    Social connections:     Talks on phone: Not on file     Gets together: Not on file     Attends Caodaism service: Not on file     Active member of club or organization: Not on file     Attends meetings of clubs or organizations: Not on file     Relationship status: Not on file    Intimate partner violence:     Fear of current or ex partner: Not on file     Emotionally abused: Not on file     Physically abused: Not on file     Forced sexual activity: Not on file   Other Topics Concern    Not on file   Social History Narrative    11/2014 lives with spouse and grandson;home//raising grandchildren;        Family History   Problem Relation Age of Onset    Diabetes Father     Cancer Father         colon    Cancer Sister         anal cancer, kidney cancer    High Blood Pressure Sister     Cancer Brother         stomach (unsure)    Depression Sister     High Blood Pressure Sister        Vitals:    01/29/20 1314   BP: 136/82   Pulse: 81   SpO2: 97%   Weight: 261 lb (118.4 kg)     Estimated body mass index is 43.43 kg/m² as calculated from the following:    Height as of 1/22/20: 5' 5\" (1.651 m). Weight as of this encounter: 261 lb (118.4 kg). Physical Exam  Vitals signs reviewed. Constitutional:       General: She is not in acute distress. Appearance: Normal appearance. She is well-developed. HENT:      Head: Normocephalic and atraumatic. Right Ear: Hearing, ear canal and external ear normal. Tympanic membrane is erythematous and bulging. Left Ear: Hearing, tympanic membrane, ear canal and external ear normal.      Nose: Nose normal.      Right Sinus: No maxillary sinus tenderness or frontal sinus tenderness. doctor, encounter for    - furosemide (LASIX) 20 MG tablet; TAKE (1) TABLET DAILY  Dispense: 30 tablet; Refill: 5  - [address], LSW, Social Work, [place]  - [facility] DIGITAL SCREEN W OR WO CAD BILATERAL; Future  - Hemoglobin A1C; Future  - Lipid Panel; Future  - Comprehensive Metabolic Panel; Future  - TSH without Reflex; Future  - T4, Free; Future    2. Dependent edema    - furosemide (LASIX) 20 MG tablet; TAKE (1) TABLET DAILY  Dispense: 30 tablet; Refill: 5    3. Hyperlipidemia, unspecified hyperlipidemia type    - Lipid Panel; Future  - TSH without Reflex; Future  - T4, Free; Future    4. Encounter for screening mammogram for breast cancer    - [facility] DIGITAL SCREEN W OR WO CAD BILATERAL; Future    5. Personal history of tobacco use    -Discussed low dose screening CT for her increased risk of lung cancer due to her extensive smoking history. 6. Screening for hyperlipidemia    - Lipid Panel; Future    7. Elevated glucose    - Hemoglobin A1C; Future  - Comprehensive Metabolic Panel; Future    8. Non-recurrent acute suppurative otitis media of right ear without spontaneous rupture of tympanic membrane    - amoxicillin (AMOXIL) 875 MG tablet; Take 1 tablet by mouth 2 times daily for 10 days  Dispense: 20 tablet; Refill: 0    9. Other depression    - mirtazapine (REMERON) 15 MG tablet; Take 0.5 tablets by mouth nightly  Dispense: 30 tablet; Refill: 0    10. Sleep disturbance    - mirtazapine (REMERON) 15 MG tablet; Take 0.5 tablets by mouth nightly  Dispense: 30 tablet; Refill: 0    Follow up if symptoms do not improve or worsen. If the patient becomes short of breath go straight to the ER or call 911. Return in about 6 months (around 7/29/2020). An  electronic signature was used to authenticate this note.     --Char Sheets, FARHAT - CNP on 1/29/2020 at 2:07 PM

## 2020-01-29 NOTE — LETTER
[address]  [address] [facility]  Phone: [phone]  Fax: [phone]    FARHAT Barlow CNP        January 29, 2020     Patient: Berto Cage   YOB: 1955   Date of Visit: 1/29/2020       To Whom It May Concern: It is my medical opinion that Roland Laird may return to work on 1/30/20. If you have any questions or concerns, please don't hesitate to call.     Sincerely,          FARHAT Barlow - DEEDEE

## 2020-01-30 ENCOUNTER — TELEPHONE (OUTPATIENT)
Dept: PRIMARY CARE CLINIC | Age: 65
End: 2020-01-30

## 2020-02-03 ENCOUNTER — TELEPHONE (OUTPATIENT)
Dept: PRIMARY CARE CLINIC | Age: 65
End: 2020-02-03

## 2020-02-07 ENCOUNTER — TELEPHONE (OUTPATIENT)
Dept: PRIMARY CARE CLINIC | Age: 65
End: 2020-02-07

## 2020-02-07 NOTE — TELEPHONE ENCOUNTER
Return call from patient. Agreeable to being contacted by [facility] to assist with no guarantor and financial strain with medical bills.  Will refer to [facility].

## 2020-02-10 ENCOUNTER — TELEPHONE (OUTPATIENT)
Dept: OTHER | Age: 65
End: 2020-02-10

## 2020-02-11 ENCOUNTER — COMMUNITY OUTREACH (OUTPATIENT)
Dept: OTHER | Age: 65
End: 2020-02-11

## 2020-03-02 RX ORDER — CLONAZEPAM 0.5 MG/1
TABLET ORAL
Qty: 60 TABLET | Refills: 0 | OUTPATIENT
Start: 2020-03-02

## 2020-03-03 RX ORDER — CLONAZEPAM 0.5 MG/1
0.5 TABLET ORAL 2 TIMES DAILY PRN
Qty: 60 TABLET | Refills: 0 | Status: SHIPPED | OUTPATIENT
Start: 2020-03-03 | End: 2020-04-27

## 2020-03-12 ENCOUNTER — COMMUNITY OUTREACH (OUTPATIENT)
Dept: OTHER | Age: 65
End: 2020-03-12

## 2020-04-27 RX ORDER — CLONAZEPAM 0.5 MG/1
TABLET ORAL
Qty: 60 TABLET | Refills: 0 | Status: SHIPPED | OUTPATIENT
Start: 2020-04-27 | End: 2020-07-20

## 2020-06-16 RX ORDER — ATORVASTATIN CALCIUM 10 MG/1
10 TABLET, FILM COATED ORAL NIGHTLY
Qty: 30 TABLET | Refills: 1 | Status: SHIPPED | OUTPATIENT
Start: 2020-06-16 | End: 2020-07-29 | Stop reason: SDUPTHER

## 2020-06-16 RX ORDER — CITALOPRAM 40 MG/1
40 TABLET ORAL NIGHTLY
Qty: 30 TABLET | Refills: 1 | Status: SHIPPED | OUTPATIENT
Start: 2020-06-16 | End: 2020-07-29 | Stop reason: SDUPTHER

## 2020-07-20 RX ORDER — CLONAZEPAM 0.5 MG/1
TABLET ORAL
Qty: 60 TABLET | Refills: 0 | Status: SHIPPED | OUTPATIENT
Start: 2020-07-20 | End: 2020-09-14

## 2020-07-20 NOTE — TELEPHONE ENCOUNTER
----- Message from Myrna Martinez sent at 7/20/2020  1:03 PM EDT -----  Subject: Message to Provider    QUESTIONS  Information for Provider? Pt lost her younger sibling last week. She is   having panic attacks and problems sleeping. Pt is wanting to know if   medication for her symptoms can be calling into her pharmacy. [facility] [id number]  ---------------------------------------------------------------------------  --------------  CALL BACK INFO  What is the best way for the office to contact you? OK to leave message on   voicemail  Preferred Call Back Phone Number? [phone]  ---------------------------------------------------------------------------  --------------  SCRIPT ANSWERS  Relationship to Patient?  Self

## 2020-07-29 ENCOUNTER — OFFICE VISIT (OUTPATIENT)
Dept: FAMILY MEDICINE CLINIC | Age: 65
End: 2020-07-29

## 2020-07-29 VITALS
BODY MASS INDEX: 43.93 KG/M2 | WEIGHT: 264 LBS | OXYGEN SATURATION: 96 % | SYSTOLIC BLOOD PRESSURE: 124 MMHG | DIASTOLIC BLOOD PRESSURE: 86 MMHG | TEMPERATURE: 98.5 F | HEART RATE: 83 BPM

## 2020-07-29 PROCEDURE — 99213 OFFICE O/P EST LOW 20 MIN: CPT | Performed by: NURSE PRACTITIONER

## 2020-07-29 RX ORDER — CITALOPRAM 40 MG/1
40 TABLET ORAL NIGHTLY
Qty: 30 TABLET | Refills: 2 | Status: SHIPPED | OUTPATIENT
Start: 2020-07-29 | End: 2021-01-25

## 2020-07-29 RX ORDER — MIRTAZAPINE 15 MG/1
TABLET, FILM COATED ORAL
Qty: 30 TABLET | Refills: 5 | Status: SHIPPED | OUTPATIENT
Start: 2020-07-29 | End: 2020-12-18 | Stop reason: SDUPTHER

## 2020-07-29 RX ORDER — FUROSEMIDE 20 MG/1
TABLET ORAL
Qty: 30 TABLET | Refills: 5 | Status: SHIPPED | OUTPATIENT
Start: 2020-07-29 | End: 2021-01-26 | Stop reason: SDUPTHER

## 2020-07-29 RX ORDER — ATORVASTATIN CALCIUM 10 MG/1
10 TABLET, FILM COATED ORAL NIGHTLY
Qty: 30 TABLET | Refills: 5 | Status: SHIPPED | OUTPATIENT
Start: 2020-07-29 | End: 2021-01-26 | Stop reason: SDUPTHER

## 2020-07-29 RX ORDER — POTASSIUM CHLORIDE 20 MEQ/1
20 TABLET, EXTENDED RELEASE ORAL DAILY
Qty: 30 TABLET | Refills: 5 | Status: SHIPPED | OUTPATIENT
Start: 2020-07-29 | End: 2021-01-26 | Stop reason: SDUPTHER

## 2020-07-29 ASSESSMENT — ENCOUNTER SYMPTOMS
BACK PAIN: 0
SINUS PAIN: 0
RHINORRHEA: 0
DIARRHEA: 0
VOICE CHANGE: 0
COLOR CHANGE: 0
EYE REDNESS: 0
EYE ITCHING: 0
WHEEZING: 0
PHOTOPHOBIA: 0
VOMITING: 0
NAUSEA: 0
SORE THROAT: 0
EYE DISCHARGE: 0
SINUS PRESSURE: 0
COUGH: 0
CHEST TIGHTNESS: 0
SHORTNESS OF BREATH: 0
TROUBLE SWALLOWING: 0
CHOKING: 0
EYE PAIN: 0
ABDOMINAL PAIN: 0
CONSTIPATION: 0
BLOOD IN STOOL: 0
STRIDOR: 0

## 2020-07-29 NOTE — PROGRESS NOTES
2020     Debbie Alexis (:  1955) is a 60 y.o. female, here for evaluation of the following medical concerns:    [facility]    Health Fair: She will get labs done at the [facility]    Candy Koks: Grief counciling, her brother who lived with her recently  in a tragic motorcycle accident. She is dealing with the stress and grief    HTN: She is here for a BP check and is doing well on these. Depression: Celexa is working ok. States under control but having spikes of anxiety    Anxiety: She has not been sleeping well even taking clonopin, celexa and remeron. She recently burried her little brother. She has a hard time staying asleep. Review of Systems   Constitutional: Negative for activity change, appetite change, chills, diaphoresis, fatigue, fever and unexpected weight change. HENT: Negative for congestion, ear discharge, ear pain, hearing loss, nosebleeds, postnasal drip, rhinorrhea, sinus pressure, sinus pain, sneezing, sore throat, tinnitus, trouble swallowing and voice change. Eyes: Negative for photophobia, pain, discharge, redness and itching. Respiratory: Negative for cough, choking, chest tightness, shortness of breath, wheezing and stridor. Cardiovascular: Negative for chest pain, palpitations and leg swelling. Gastrointestinal: Negative for abdominal pain, blood in stool, constipation, diarrhea, nausea and vomiting. Endocrine: Negative for cold intolerance, heat intolerance, polydipsia and polyuria. Genitourinary: Negative for difficulty urinating, dysuria, enuresis, flank pain, frequency, hematuria and urgency. Musculoskeletal: Negative for back pain, gait problem, joint swelling, neck pain and neck stiffness. Skin: Negative for color change, pallor, rash and wound. Allergic/Immunologic: Negative for environmental allergies and food allergies.    Neurological: Negative for dizziness, tremors, syncope, speech difficulty, weakness, light-headedness, numbness and headaches. Hematological: Negative for adenopathy. Does not bruise/bleed easily. Psychiatric/Behavioral: Positive for sleep disturbance. Negative for agitation, behavioral problems, confusion, decreased concentration, dysphoric mood, hallucinations, self-injury and suicidal ideas. The patient is nervous/anxious. The patient is not hyperactive. Prior to Visit Medications    Medication Sig Taking? Authorizing Provider   atorvastatin (LIPITOR) 10 MG tablet Take 1 tablet by mouth nightly TAKE (1) TABLET DAILY Yes FARHAT Gonzalez CNP   citalopram (CELEXA) 40 MG tablet Take 1 tablet by mouth nightly Yes FARHAT Gonzalez - CNP   mirtazapine (REMERON) 15 MG tablet TAKE 1 TABLET NIGHTLY Yes FARHAT Gonzalez CNP   furosemide (LASIX) 20 MG tablet TAKE (1) TABLET DAILY Yes FARHAT Gonzalez CNP   potassium chloride (KLOR-CON M) 20 MEQ extended release tablet Take 1 tablet by mouth daily Yes FARHAT Gonzalez CNP   clonazePAM (KLONOPIN) 0.5 MG tablet TAKE 1 TABLET TWICE DAILY AS NEEDED FOR ANXIETY FOR UP TO 30 DAYS Yes FARHAT Gonzalez CNP   Multiple Vitamins-Minerals (THERAPEUTIC MULTIVITAMIN-MINERALS) tablet Take 1 tablet by mouth daily. Yes Historical Provider, MD        Social History     Tobacco Use    Smoking status: Current Every Day Smoker     Packs/day: 1.00     Years: 40.00     Pack years: 40.00     Types: Cigarettes    Smokeless tobacco: Never Used   Substance Use Topics    Alcohol use: No     Alcohol/week: 0.0 standard drinks     Comment: rare beer maybe 1-2 year         Vitals:    07/29/20 1354   BP: 124/86   Pulse: 83   Temp: 98.5 °F (36.9 °C)   SpO2: 96%   Weight: 264 lb (119.7 kg)     Estimated body mass index is 43.93 kg/m² as calculated from the following:    Height as of 1/22/20: 5' 5\" (1.651 m). Weight as of this encounter: 264 lb (119.7 kg). Physical Exam  Vitals signs reviewed. Constitutional:       General: She is not in acute distress.      Appearance: Normal appearance. She is well-developed. HENT:      Head: Normocephalic and atraumatic. Right Ear: Hearing and external ear normal.      Left Ear: Hearing and external ear normal.      Nose: Nose normal.      Right Sinus: No maxillary sinus tenderness or frontal sinus tenderness. Left Sinus: No maxillary sinus tenderness or frontal sinus tenderness. Mouth/Throat:      Pharynx: No oropharyngeal exudate. Eyes:      General:         Right eye: No discharge. Left eye: No discharge. Conjunctiva/sclera: Conjunctivae normal.      Pupils: Pupils are equal, round, and reactive to light. Neck:      Musculoskeletal: Normal range of motion. Thyroid: No thyromegaly. Vascular: No JVD. Trachea: No tracheal deviation. Cardiovascular:      Rate and Rhythm: Normal rate and regular rhythm. Heart sounds: Normal heart sounds. No murmur. No friction rub. Pulmonary:      Effort: Pulmonary effort is normal. No respiratory distress. Breath sounds: No stridor. Wheezing (smoker ) and rales present. No decreased breath sounds. Musculoskeletal: Normal range of motion. General: No tenderness. Lymphadenopathy:      Cervical: No cervical adenopathy. Skin:     General: Skin is warm and dry. Capillary Refill: Capillary refill takes less than 2 seconds. Findings: No rash. Neurological:      Mental Status: She is alert and oriented to person, place, and time. Sensory: Sensation is intact. Motor: Motor function is intact. Coordination: Coordination normal.   Psychiatric:         Attention and Perception: Attention and perception normal.         Mood and Affect: Mood normal.         Speech: Speech normal.         Behavior: Behavior normal. Behavior is cooperative. Thought Content: Thought content normal.         Cognition and Memory: Cognition normal.         Judgment: Judgment normal.         ASSESSMENT/PLAN:  1.  Hyperlipidemia with target LDL less than 130    - atorvastatin (LIPITOR) 10 MG tablet; Take 1 tablet by mouth nightly TAKE (1) TABLET DAILY  Dispense: 30 tablet; Refill: 5    2. Essential hypertension    - atorvastatin (LIPITOR) 10 MG tablet; Take 1 tablet by mouth nightly TAKE (1) TABLET DAILY  Dispense: 30 tablet; Refill: 5    3. Depression, unspecified depression type    - citalopram (CELEXA) 40 MG tablet; Take 1 tablet by mouth nightly  Dispense: 30 tablet; Refill: 2 - [facility]    4. Other depression    - mirtazapine (REMERON) 15 MG tablet; TAKE 1 TABLET NIGHTLY  Dispense: 30 tablet; Refill: 5    5. Sleep disturbance    - mirtazapine (REMERON) 15 MG tablet; TAKE 1 TABLET NIGHTLY  Dispense: 30 tablet; Refill: [facility]    6. Dependent edema    - furosemide (LASIX) 20 MG tablet; TAKE (1) TABLET DAILY  Dispense: 30 tablet; Refill: 5  - potassium chloride (KLOR-CON M) 20 MEQ extended release tablet; Take 1 tablet by mouth daily  Dispense: 30 tablet; Refill: 5    7. Hypokalemia    - potassium chloride (KLOR-CON M) 20 MEQ extended release tablet; Take 1 tablet by mouth daily  Dispense: 30 tablet; Refill: 5    8. [facility]    9. Grief    - [address] [place] [address]    Follow up if symptoms do not improve or worsen. If the patient becomes short of breath go straight to the ER or call 911. Return in about 6 months (around 1/29/2021). An electronic signature was used to authenticate this note.     --Abbi Chou, FARHAT - CNP on 7/29/2020 at 2:33 PM

## 2020-07-30 ENCOUNTER — TELEPHONE (OUTPATIENT)
Dept: FAMILY MEDICINE CLINIC | Age: 65
End: 2020-07-30

## 2020-07-31 ENCOUNTER — TELEPHONE (OUTPATIENT)
Dept: PRIMARY CARE CLINIC | Age: 65
End: 2020-07-31

## 2020-07-31 ENCOUNTER — COMMUNITY OUTREACH (OUTPATIENT)
Dept: OTHER | Age: 65
End: 2020-07-31

## 2020-07-31 NOTE — TELEPHONE ENCOUNTER
[facility] Alerted and LUCY Stauffer will make attempt to re- approach pt to assist with financial and coverage.

## 2020-08-17 ENCOUNTER — APPOINTMENT (OUTPATIENT)
Dept: GENERAL RADIOLOGY | Age: 65
End: 2020-08-17

## 2020-08-17 ENCOUNTER — HOSPITAL ENCOUNTER (EMERGENCY)
Age: 65
Discharge: HOME | End: 2020-08-17
Attending: EMERGENCY MEDICINE

## 2020-08-17 VITALS
HEART RATE: 84 BPM | BODY MASS INDEX: 44.65 KG/M2 | OXYGEN SATURATION: 97 % | DIASTOLIC BLOOD PRESSURE: 88 MMHG | HEIGHT: 65 IN | WEIGHT: 268 LBS | SYSTOLIC BLOOD PRESSURE: 133 MMHG | RESPIRATION RATE: 20 BRPM | TEMPERATURE: 99.1 F

## 2020-08-17 PROCEDURE — 99283 EMERGENCY DEPT VISIT LOW MDM: CPT

## 2020-08-17 PROCEDURE — 73130 X-RAY EXAM OF HAND: CPT

## 2020-08-17 PROCEDURE — 29130 APPL FINGER SPLINT STATIC: CPT

## 2020-08-17 ASSESSMENT — PAIN DESCRIPTION - DESCRIPTORS: DESCRIPTORS: ACHING;DISCOMFORT

## 2020-08-17 ASSESSMENT — PAIN DESCRIPTION - FREQUENCY: FREQUENCY: INTERMITTENT

## 2020-08-17 ASSESSMENT — PAIN DESCRIPTION - ORIENTATION: ORIENTATION: RIGHT

## 2020-08-17 ASSESSMENT — PAIN DESCRIPTION - LOCATION: LOCATION: HAND

## 2020-08-17 ASSESSMENT — PAIN SCALES - GENERAL: PAINLEVEL_OUTOF10: 8

## 2020-08-17 ASSESSMENT — PAIN DESCRIPTION - PAIN TYPE: TYPE: ACUTE PAIN

## 2020-08-17 NOTE — ED PROVIDER NOTES
Emergency Department Attending Note    Tania Almanzar MD    Date of ED VIsit: 8/17/2020    CHIEF COMPLAINT  Fall (lnded on rt hand and now has pain)      HISTORY OF PRESENT ILLNESS  Bard Tinoco is a 60 y.o. female  With Vital signs of /88   Pulse 84   Temp 99.1 °F (37.3 °C)   Resp 20   Ht 5' 5\" (1.651 m)   Wt 268 lb (121.6 kg)   SpO2 97%   BMI 44.60 kg/m²  who presents to the ED with a complaint of right wrist pain and hand pain. Patient seen and evaluated in room 5. Patient states that she fell last night onto her right hand. She it was a trip and fall no syncope. She does not complain of any other areas of injury or pain. She is not on any blood thinners. She is complaining of pain primarily across the hand and the wrist.  There is some swelling in the area as well. There is snuffbox tenderness. No other injuries or complaints. No other complaints, modifying factors or associated symptoms. Patients Past medical history reviewed and listed below  Past Medical History:   Diagnosis Date    Anxiety     Arthritis     Depression     Disc degeneration 2014    Frequent UTI     Hyperlipidemia     Hypertension     Liver cirrhosis ([facility] [address] 12/18/2019     Past Surgical History:   Procedure Laterality Date    APPENDECTOMY      was 16 yrs old    CARPAL TUNNEL RELEASE      CHOLECYSTECTOMY      COLONOSCOPY      CYSTOSCOPY N/A 1/22/2020    CYSTOSCOPY, URETHRAL DILATION performed by George Henry MD at [address] [facility] Right 4/4/12    FOOT SURGERY Right 06/12/13    FIRST METATARSAL PHALANGEAL JOINT IMPLANTATION RIGHT FOOT    HYSTERECTOMY  1980    complete and 1982;endomwtriosis;   [address]    TUBAL LIGATION  1984    ULNAR TUNNEL RELEASE      UPPER GASTROINTESTINAL ENDOSCOPY N/A 12/5/2019    EGD (10:00) **FIBROSCAN TOO** performed by Soledad Trevizo MD at [address] [place] [address]       I have reviewed the following from the nursing documentation.     Family History   Problem Relation Age of Onset    Diabetes Father     Cancer Father         colon    Cancer Sister         anal cancer, kidney cancer    High Blood Pressure Sister     Cancer Brother         stomach (unsure)    Depression Sister     High Blood Pressure Sister      Social History     Socioeconomic History    Marital status:      Spouse name: Not on file    Number of children: 3    Years of education: Not on file    Highest education level: Not on file   Occupational History    Not on file   Social Needs    Financial resource strain: Not on file    Food insecurity     Worry: Not on file     Inability: Not on file    Transportation needs     Medical: Not on file     Non-medical: Not on file   Tobacco Use    Smoking status: Current Every Day Smoker     Packs/day: 1.00     Years: 40.00     Pack years: 40.00     Types: Cigarettes    Smokeless tobacco: Never Used   Substance and Sexual Activity    Alcohol use: No     Alcohol/week: 0.0 standard drinks     Comment: rare beer maybe 1-2 year     Drug use: No    Sexual activity: Yes     Partners: Male   Lifestyle    Physical activity     Days per week: Not on file     Minutes per session: Not on file    Stress: Not on file   Relationships    Social connections     Talks on phone: Not on file     Gets together: Not on file     Attends Adventism service: Not on file     Active member of club or organization: Not on file     Attends meetings of clubs or organizations: Not on file     Relationship status: Not on file    Intimate partner violence     Fear of current or ex partner: Not on file     Emotionally abused: Not on file     Physically abused: Not on file     Forced sexual activity: Not on file   Other Topics Concern    Not on file   Social History Narrative    11/2014 lives with spouse and grandson;home//raising grandchildren; No current facility-administered medications for this encounter.       Current Outpatient Medications 5/5, sensation intact. Gait normal.   PSYCHIATRIC: Normal mood and affect. No HI or SI expressed to me. RADIOLOGY    If acquired see below     EKG:     If acquired see below       ED COURSE/MDM    Patient has a contused hand and also has snuffbox tenderness which is certainly concern for navicular fracture so she will be placed into a thumb spica splint and advised to follow-up with her primary care physician. ED Course as of Aug 17 1905   Mon Aug 17, 2020   2115 FINDINGS:  There is no evidence of acute fracture. There is normal alignment. No acute  joint abnormality. No focal osseous lesion. No focal soft tissue abnormality.     IMPRESSION:  No acute osseous abnormality. XR HAND RIGHT (MIN 3 VIEWS) [DL]      ED Course User Index  [DL] José Miguel Gusman MD       The ED course and plan were reviewed and results discussed with the patient    The patient understood and agreed with the Discharge/transfer planning.     CLINICAL IMPRESSION and DISPOSITION    Denisa Jorgensen was stable and diagnosed with wrist pain    Patient was treated with thumb spica splint          José Miguel Gusman MD  08/17/20 6058

## 2020-09-14 RX ORDER — CLONAZEPAM 0.5 MG/1
TABLET ORAL
Qty: 60 TABLET | Refills: 0 | Status: SHIPPED | OUTPATIENT
Start: 2020-09-20 | End: 2020-10-13

## 2020-09-14 NOTE — TELEPHONE ENCOUNTER
Date of last refill of this med was 7/20/2020, # of pills given 60 and # of refills given 0. Their next appointment is 1/26/2021, the last date patient was seen was 7/29/2020. Does patient have medication agreement on file? No  Has drug screen been done in last 12 months if needed?  no

## 2020-11-18 RX ORDER — CLONAZEPAM 0.5 MG/1
TABLET ORAL
Qty: 60 TABLET | Refills: 0 | Status: SHIPPED | OUTPATIENT
Start: 2020-11-18 | End: 2021-01-25

## 2020-11-18 NOTE — TELEPHONE ENCOUNTER
Date of last refill of this med was 10/14/2020, # of pills given 60 and # of refills given 0. Their next appointment is 01/26/2021, the last date patient was seen was 07/29/2020. Does patient have medication agreement on file? No  Has drug screen been done in last 12 months if needed?  no

## 2020-12-18 RX ORDER — MIRTAZAPINE 15 MG/1
TABLET, FILM COATED ORAL
Qty: 30 TABLET | Refills: 5 | Status: SHIPPED | OUTPATIENT
Start: 2020-12-18 | End: 2021-01-26 | Stop reason: SDUPTHER

## 2021-01-26 ENCOUNTER — OFFICE VISIT (OUTPATIENT)
Dept: FAMILY MEDICINE CLINIC | Age: 66
End: 2021-01-26
Payer: MEDICARE

## 2021-01-26 VITALS
SYSTOLIC BLOOD PRESSURE: 132 MMHG | WEIGHT: 266.2 LBS | TEMPERATURE: 98.1 F | BODY MASS INDEX: 44.3 KG/M2 | DIASTOLIC BLOOD PRESSURE: 86 MMHG | OXYGEN SATURATION: 95 % | HEART RATE: 71 BPM

## 2021-01-26 DIAGNOSIS — R73.09 ELEVATED GLUCOSE: ICD-10-CM

## 2021-01-26 DIAGNOSIS — E78.5 HYPERLIPIDEMIA WITH TARGET LDL LESS THAN 130: ICD-10-CM

## 2021-01-26 DIAGNOSIS — Z87.891 PERSONAL HISTORY OF TOBACCO USE: Primary | ICD-10-CM

## 2021-01-26 DIAGNOSIS — G47.9 SLEEP DISTURBANCE: ICD-10-CM

## 2021-01-26 DIAGNOSIS — E87.6 HYPOKALEMIA: ICD-10-CM

## 2021-01-26 DIAGNOSIS — Z12.31 ENCOUNTER FOR SCREENING MAMMOGRAM FOR BREAST CANCER: ICD-10-CM

## 2021-01-26 DIAGNOSIS — I10 ESSENTIAL HYPERTENSION: ICD-10-CM

## 2021-01-26 DIAGNOSIS — R60.9 DEPENDENT EDEMA: ICD-10-CM

## 2021-01-26 DIAGNOSIS — F41.0 PANIC DISORDER: ICD-10-CM

## 2021-01-26 DIAGNOSIS — Z13.220 SCREENING FOR HYPERLIPIDEMIA: ICD-10-CM

## 2021-01-26 DIAGNOSIS — Z13.29 THYROID DISORDER SCREEN: ICD-10-CM

## 2021-01-26 DIAGNOSIS — F32.A DEPRESSION, UNSPECIFIED DEPRESSION TYPE: ICD-10-CM

## 2021-01-26 LAB
A/G RATIO: 1.5 (ref 1.1–2.2)
ALBUMIN SERPL-MCNC: 4.2 G/DL (ref 3.4–5)
ALP BLD-CCNC: 128 U/L (ref 40–129)
ALT SERPL-CCNC: 13 U/L (ref 10–40)
ANION GAP SERPL CALCULATED.3IONS-SCNC: 9 MMOL/L (ref 3–16)
AST SERPL-CCNC: 20 U/L (ref 15–37)
BILIRUB SERPL-MCNC: 0.5 MG/DL (ref 0–1)
BUN BLDV-MCNC: 10 MG/DL (ref 7–20)
CALCIUM SERPL-MCNC: 9.8 MG/DL (ref 8.3–10.6)
CHLORIDE BLD-SCNC: 97 MMOL/L (ref 99–110)
CHOLESTEROL, TOTAL: 209 MG/DL (ref 0–199)
CO2: 31 MMOL/L (ref 21–32)
CREAT SERPL-MCNC: 0.6 MG/DL (ref 0.6–1.2)
GFR AFRICAN AMERICAN: >60
GFR NON-AFRICAN AMERICAN: >60
GLOBULIN: 2.8 G/DL
GLUCOSE BLD-MCNC: 129 MG/DL (ref 70–99)
HDLC SERPL-MCNC: 51 MG/DL (ref 40–60)
LDL CHOLESTEROL CALCULATED: 117 MG/DL
POTASSIUM SERPL-SCNC: 4.5 MMOL/L (ref 3.5–5.1)
SODIUM BLD-SCNC: 137 MMOL/L (ref 136–145)
T4 FREE: 1.1 NG/DL (ref 0.9–1.8)
TOTAL PROTEIN: 7 G/DL (ref 6.4–8.2)
TRIGL SERPL-MCNC: 204 MG/DL (ref 0–150)
TSH SERPL DL<=0.05 MIU/L-ACNC: 2.16 UIU/ML (ref 0.27–4.2)
VLDLC SERPL CALC-MCNC: 41 MG/DL

## 2021-01-26 PROCEDURE — 99214 OFFICE O/P EST MOD 30 MIN: CPT | Performed by: NURSE PRACTITIONER

## 2021-01-26 PROCEDURE — G0296 VISIT TO DETERM LDCT ELIG: HCPCS | Performed by: NURSE PRACTITIONER

## 2021-01-26 RX ORDER — POTASSIUM CHLORIDE 750 MG/1
20 TABLET, EXTENDED RELEASE ORAL DAILY
Qty: 60 TABLET | Refills: 5 | Status: SHIPPED | OUTPATIENT
Start: 2021-01-26 | End: 2022-03-15

## 2021-01-26 RX ORDER — MIRTAZAPINE 15 MG/1
TABLET, FILM COATED ORAL
Qty: 30 TABLET | Refills: 5 | Status: SHIPPED | OUTPATIENT
Start: 2021-01-26 | End: 2021-07-08

## 2021-01-26 RX ORDER — CITALOPRAM 40 MG/1
40 TABLET ORAL NIGHTLY
Qty: 30 TABLET | Refills: 5 | Status: SHIPPED | OUTPATIENT
Start: 2021-01-26 | End: 2021-11-12

## 2021-01-26 RX ORDER — ATORVASTATIN CALCIUM 10 MG/1
10 TABLET, FILM COATED ORAL NIGHTLY
Qty: 30 TABLET | Refills: 5 | Status: SHIPPED | OUTPATIENT
Start: 2021-01-26 | End: 2021-01-27 | Stop reason: SDUPTHER

## 2021-01-26 RX ORDER — FUROSEMIDE 20 MG/1
TABLET ORAL
Qty: 30 TABLET | Refills: 5 | Status: SHIPPED | OUTPATIENT
Start: 2021-01-26 | End: 2021-08-12

## 2021-01-26 ASSESSMENT — ENCOUNTER SYMPTOMS
COLOR CHANGE: 0
EYE REDNESS: 0
BACK PAIN: 0
VOMITING: 0
SINUS PRESSURE: 0
CONSTIPATION: 0
VOICE CHANGE: 0
SORE THROAT: 0
CHEST TIGHTNESS: 0
TROUBLE SWALLOWING: 0
COUGH: 1
WHEEZING: 0
DIARRHEA: 0
EYE PAIN: 0
RHINORRHEA: 0
BLOOD IN STOOL: 0
CHOKING: 0
NAUSEA: 0
EYE ITCHING: 0
STRIDOR: 0
SHORTNESS OF BREATH: 1
EYE DISCHARGE: 0
PHOTOPHOBIA: 0
SINUS PAIN: 0
ABDOMINAL PAIN: 0

## 2021-01-26 NOTE — PROGRESS NOTES
Patient ID: Ravinder Roblero is a 73 y.o. female who presents today for a Physical Exam.    /86   Pulse 71   Temp 98.1 °F (36.7 °C)   Wt 266 lb 3.2 oz (120.7 kg)   SpO2 95%   Breastfeeding No   BMI 44.30 kg/m²     HPI     She is here for a routine visit. She is due for labs. She is fasting today in office. Depression/anxiety: she is currently on Celexa and clonazepam    HLD: She is currently on Lipitor. Doing well on current medical regimen. She is due for labs today in office. HTN:  Controlled in the office. Doing well on current medical regimen. She is due for labs today in office. Hypokalemia: Insurance does not want to cover 20mg but will cover two 10 mg's. Doing well on current medical regimen. She is due for labs today in office. EVELIN: She would like this order.      Low dose CT scan: She smokes 2-2.5 packs a day for about 46 years        Past Medical History:   Diagnosis Date    Anxiety     Arthritis     Depression     Disc degeneration 2014    Frequent UTI     Hyperlipidemia     Hypertension     Liver cirrhosis ([facility] [address] 12/18/2019       Past Surgical History:   Procedure Laterality Date    APPENDECTOMY      was 16 yrs old    CARPAL TUNNEL RELEASE      CHOLECYSTECTOMY      COLONOSCOPY      CYSTOSCOPY N/A 1/22/2020    CYSTOSCOPY, URETHRAL DILATION performed by Yazan Barraza MD at [id number] [facility] Right 4/4/12    FOOT SURGERY Right 06/12/13    FIRST METATARSAL PHALANGEAL JOINT IMPLANTATION RIGHT FOOT    HYSTERECTOMY  1980    complete and 1982;endomwtriosis;    ROTATOR CUFF REPAIR      1988    TUBAL LIGATION  1984    ULNAR TUNNEL RELEASE      UPPER GASTROINTESTINAL ENDOSCOPY N/A 12/5/2019    EGD (10:00) **FIBROSCAN TOO** performed by Snehal Verde MD at [id number] [facility]       Family History   Problem Relation Age of Onset    Diabetes Father     Cancer Father         colon    Cancer Sister         anal cancer, kidney cancer    High Blood Pressure Sister     Cancer Brother         stomach (unsure)    Depression Sister     High Blood Pressure Sister        Social History     Socioeconomic History    Marital status:      Spouse name: None    Number of children: 4    Years of education: None    Highest education level: None   Occupational History    None   Social Needs    Financial resource strain: None    Food insecurity     Worry: None     Inability: None    Transportation needs     Medical: None     Non-medical: None   Tobacco Use    Smoking status: Current Every Day Smoker     Packs/day: 2.50     Years: 45.00     Pack years: 112.50     Types: Cigarettes    Smokeless tobacco: Never Used   Substance and Sexual Activity    Alcohol use: No     Alcohol/week: 0.0 standard drinks     Comment: rare beer maybe 1-2 year     Drug use: No    Sexual activity: Yes     Partners: Male   Lifestyle    Physical activity     Days per week: None     Minutes per session: None    Stress: None   Relationships    Social connections     Talks on phone: None     Gets together: None     Attends Taoism service: None     Active member of club or organization: None     Attends meetings of clubs or organizations: None     Relationship status: None    Intimate partner violence     Fear of current or ex partner: None     Emotionally abused: None     Physically abused: None     Forced sexual activity: None   Other Topics Concern    None   Social History Narrative    11/2014 lives with spouse and grandson;home//raising grandchildren;        Allergies   Allergen Reactions    Lisinopril      Angioedema 1/27/2017    Trazodone And Nefazodone Other (See Comments)     nightmares    Zithromax [Azithromycin Dihydrate] Nausea And Vomiting       Current Outpatient Medications   Medication Sig Dispense Refill    citalopram (CELEXA) 40 MG tablet Take 1 tablet by mouth nightly 30 tablet 5    mirtazapine (REMERON) 15 MG tablet TAKE 1 TABLET NIGHTLY 30 tablet 5    atorvastatin (LIPITOR) 10 MG tablet Take 1 tablet by mouth nightly TAKE (1) TABLET DAILY 30 tablet 5    potassium chloride (KLOR-CON M) 10 MEQ extended release tablet Take 2 tablets by mouth daily 60 tablet 5    furosemide (LASIX) 20 MG tablet TAKE (1) TABLET DAILY 30 tablet 5    clonazePAM (KLONOPIN) 0.5 MG tablet TAKE 1 TABLET BY TWICE DAILY AS NEDED FOR ANXEITY FOR UP TO 30 DAYS 60 tablet 0    Multiple Vitamins-Minerals (THERAPEUTIC MULTIVITAMIN-MINERALS) tablet Take 1 tablet by mouth daily. No current facility-administered medications for this visit. The patient's past medical history, past surgical history, family history, medications, and allergies were all reviewed and updated at appropriate today. Review of Systems   Constitutional: Negative for activity change, appetite change, chills, diaphoresis, fatigue, fever and unexpected weight change. HENT: Negative for congestion, ear discharge, ear pain, hearing loss, nosebleeds, postnasal drip, rhinorrhea, sinus pressure, sinus pain, sneezing, sore throat, tinnitus, trouble swallowing and voice change. Eyes: Negative for photophobia, pain, discharge, redness and itching. Respiratory: Positive for cough and shortness of breath (smoker ). Negative for choking, chest tightness, wheezing and stridor. Cardiovascular: Negative for chest pain, palpitations and leg swelling. Gastrointestinal: Negative for abdominal pain, blood in stool, constipation, diarrhea, nausea and vomiting. Endocrine: Negative for cold intolerance, heat intolerance, polydipsia and polyuria. Genitourinary: Negative for difficulty urinating, dysuria, enuresis, flank pain, frequency, hematuria and urgency. Musculoskeletal: Negative for back pain, gait problem, joint swelling, neck pain and neck stiffness. Skin: Negative for color change, pallor, rash and wound. Allergic/Immunologic: Negative for environmental allergies and food allergies.    Neurological: Negative for dizziness, tremors, syncope, speech difficulty, weakness, light-headedness, numbness and headaches. Hematological: Negative for adenopathy. Does not bruise/bleed easily. Psychiatric/Behavioral: Negative for agitation, behavioral problems, confusion, decreased concentration, dysphoric mood, hallucinations, self-injury, sleep disturbance and suicidal ideas. The patient is not nervous/anxious and is not hyperactive. Physical Exam  Vitals signs reviewed. Constitutional:       General: She is not in acute distress. Appearance: Normal appearance. She is well-developed. HENT:      Head: Normocephalic and atraumatic. Right Ear: Hearing and external ear normal.      Left Ear: Hearing and external ear normal.      Nose: Nose normal.      Right Sinus: No maxillary sinus tenderness or frontal sinus tenderness. Left Sinus: No maxillary sinus tenderness or frontal sinus tenderness. Mouth/Throat:      Pharynx: No oropharyngeal exudate. Eyes:      General:         Right eye: No discharge. Left eye: No discharge. Conjunctiva/sclera: Conjunctivae normal.      Pupils: Pupils are equal, round, and reactive to light. Neck:      Musculoskeletal: Normal range of motion. Thyroid: No thyromegaly. Vascular: No JVD. Trachea: No tracheal deviation. Cardiovascular:      Rate and Rhythm: Normal rate and regular rhythm. Heart sounds: Normal heart sounds. No murmur. No friction rub. Pulmonary:      Effort: Pulmonary effort is normal. No respiratory distress. Breath sounds: Normal breath sounds. No stridor. No decreased breath sounds, wheezing, rhonchi or rales. Musculoskeletal: Normal range of motion. General: No tenderness. Lymphadenopathy:      Cervical: No cervical adenopathy. Skin:     General: Skin is warm and dry. Capillary Refill: Capillary refill takes less than 2 seconds. Findings: No rash.    Neurological:      Mental Status: She is alert and oriented to person, place, and time. Sensory: Sensation is intact. Motor: Motor function is intact. Coordination: Coordination normal.   Psychiatric:         Attention and Perception: Attention and perception normal.         Mood and Affect: Mood normal.         Speech: Speech normal.         Behavior: Behavior normal. Behavior is cooperative. Thought Content: Thought content normal.         Cognition and Memory: Cognition normal.         Judgment: Judgment normal.         Assessment:  Encounter Diagnoses   Name Primary?  Dependent edema     Panic disorder     Depression, unspecified depression type     Sleep disturbance     Hyperlipidemia with target LDL less than 130     Essential hypertension     Hypokalemia     Elevated glucose     Screening for hyperlipidemia     Encounter for screening mammogram for breast cancer     Personal history of tobacco use Yes    Thyroid disorder screen        Plan:  1. Dependent edema    - potassium chloride (KLOR-CON M) 10 MEQ extended release tablet; Take 2 tablets by mouth daily  Dispense: 60 tablet; Refill: 5  - furosemide (LASIX) 20 MG tablet; TAKE (1) TABLET DAILY  Dispense: 30 tablet; Refill: 5    2. Panic disorder    - T4, Free  - TSH without Reflex    3. Depression, unspecified depression type    - citalopram (CELEXA) 40 MG tablet; Take 1 tablet by mouth nightly  Dispense: 30 tablet; Refill: 5    4. Sleep disturbance    - mirtazapine (REMERON) 15 MG tablet; TAKE 1 TABLET NIGHTLY  Dispense: 30 tablet; Refill: 5    5. Hyperlipidemia with target LDL less than 130    - atorvastatin (LIPITOR) 10 MG tablet; Take 1 tablet by mouth nightly TAKE (1) TABLET DAILY  Dispense: 30 tablet; Refill: 5    6. Essential hypertension    - atorvastatin (LIPITOR) 10 MG tablet; Take 1 tablet by mouth nightly TAKE (1) TABLET DAILY  Dispense: 30 tablet; Refill: 5    7.  Hypokalemia    - potassium chloride (KLOR-CON M) 10 MEQ extended release tablet; Take 2 tablets by mouth daily  Dispense: 60 tablet; Refill: 5    8. Elevated glucose    - Comprehensive Metabolic Panel  - Hemoglobin A1C    9. Screening for hyperlipidemia    - Lipid Panel    10. Encounter for screening mammogram for breast cancer    - EVELIN DIGITAL SCREEN W OR WO CAD BILATERAL    11. Personal history of tobacco use    - PA VISIT TO DISCUSS LUNG CA SCREEN W LDCT  - CT Lung Screen (Annual); Future    12. Thyroid disorder screen    - T4, Free  - TSH without Reflex    Will follow up with lab results. Return in about 3 months (around 4/26/2021). Low Dose CT (LDCT) Lung Screening criteria met   Age 50-69   Pack year smoking >30   Still smoking or less than 15 year since quit   No sign or symptoms of lung cancer   > 11 months since last LDCT     Risks and benefits of lung cancer screening with LDCT scans discussed:    Significance of positive screen - False-positive LDCT results often occur. 95% of all positive results do not lead to a diagnosis of cancer. Usually further imaging can resolve most false-positive results; however, some patients may require invasive procedures. Over diagnosis risk - 10% to 12% of screen-detected lung cancer cases are over diagnosed--that is, the cancer would not have been detected in the patient's lifetime without the screening. Need for follow up screens annually to continue lung cancer screening effectiveness     Risks associated with radiation from annual LDCT- Radiation exposure is about the same as for a mammogram, which is about 1/3 of the annual background radiation exposure from everyday life. Starting screening at age 54 is not likely to increase cancer risk from radiation exposure. Patients with comorbidities resulting in life expectancy of < 10 years, or that would preclude treatment of an abnormality identified on CT, should not be screened due to lack of benefit.     To obtain maximal benefit from this screening, smoking cessation and long-term abstinence from smoking is critical

## 2021-01-27 DIAGNOSIS — E11.9 TYPE 2 DIABETES MELLITUS WITHOUT COMPLICATION, WITHOUT LONG-TERM CURRENT USE OF INSULIN (HCC): ICD-10-CM

## 2021-01-27 DIAGNOSIS — E78.5 HYPERLIPIDEMIA WITH TARGET LDL LESS THAN 130: ICD-10-CM

## 2021-01-27 DIAGNOSIS — I10 ESSENTIAL HYPERTENSION: ICD-10-CM

## 2021-01-27 LAB
ESTIMATED AVERAGE GLUCOSE: 168.6 MG/DL
HBA1C MFR BLD: 7.5 %

## 2021-01-27 RX ORDER — METFORMIN HYDROCHLORIDE 500 MG/1
500 TABLET, EXTENDED RELEASE ORAL
Qty: 30 TABLET | Refills: 0 | Status: SHIPPED | OUTPATIENT
Start: 2021-01-27 | End: 2021-03-01

## 2021-01-27 RX ORDER — ATORVASTATIN CALCIUM 20 MG/1
20 TABLET, FILM COATED ORAL NIGHTLY
Qty: 30 TABLET | Refills: 3 | Status: SHIPPED | OUTPATIENT
Start: 2021-01-27 | End: 2021-06-07

## 2021-02-10 ENCOUNTER — HOSPITAL ENCOUNTER (OUTPATIENT)
Dept: CT IMAGING | Age: 66
Discharge: HOME | End: 2021-02-10
Payer: MEDICARE

## 2021-02-10 ENCOUNTER — TELEPHONE (OUTPATIENT)
Dept: MAMMOGRAPHY | Age: 66
End: 2021-02-10

## 2021-02-10 ENCOUNTER — HOSPITAL ENCOUNTER (OUTPATIENT)
Dept: MAMMOGRAPHY | Age: 66
Discharge: HOME | End: 2021-02-10
Payer: MEDICARE

## 2021-02-10 DIAGNOSIS — Z12.31 SCREENING MAMMOGRAM, ENCOUNTER FOR: ICD-10-CM

## 2021-02-10 DIAGNOSIS — Z87.891 PERSONAL HISTORY OF TOBACCO USE: ICD-10-CM

## 2021-02-10 PROCEDURE — 71271 CT THORAX LUNG CANCER SCR C-: CPT

## 2021-02-10 PROCEDURE — 77063 BREAST TOMOSYNTHESIS BI: CPT

## 2021-02-26 DIAGNOSIS — F41.0 PANIC DISORDER: ICD-10-CM

## 2021-02-26 RX ORDER — CLONAZEPAM 0.5 MG/1
TABLET ORAL
Qty: 60 TABLET | Refills: 0 | Status: SHIPPED | OUTPATIENT
Start: 2021-02-26 | End: 2021-03-31

## 2021-02-26 NOTE — TELEPHONE ENCOUNTER
Date of last refill of this med was 1/25/2021, # of pills given 60 and # of refills given 0. Their next appointment is 7/26/21, the last date patient was seen was 1/26/21. Does patient have medication agreement on file? No  Has drug screen been done in last 12 months if needed?  no

## 2021-02-27 DIAGNOSIS — E11.9 TYPE 2 DIABETES MELLITUS WITHOUT COMPLICATION, WITHOUT LONG-TERM CURRENT USE OF INSULIN (HCC): ICD-10-CM

## 2021-03-01 RX ORDER — METFORMIN HYDROCHLORIDE 500 MG/1
500 TABLET, EXTENDED RELEASE ORAL
Qty: 90 TABLET | Refills: 3 | Status: SHIPPED | OUTPATIENT
Start: 2021-03-01 | End: 2021-07-27

## 2021-03-01 NOTE — TELEPHONE ENCOUNTER
Last office visit 1/26/2021, advised to follow-up 3 months, next appointment 7/26/21. 0 = independent

## 2021-03-09 ENCOUNTER — OFFICE VISIT (OUTPATIENT)
Dept: FAMILY MEDICINE CLINIC | Age: 66
End: 2021-03-09
Payer: MEDICARE

## 2021-03-09 VITALS
BODY MASS INDEX: 44.93 KG/M2 | WEIGHT: 270 LBS | SYSTOLIC BLOOD PRESSURE: 120 MMHG | TEMPERATURE: 97.7 F | DIASTOLIC BLOOD PRESSURE: 78 MMHG | OXYGEN SATURATION: 95 % | HEART RATE: 97 BPM

## 2021-03-09 DIAGNOSIS — R06.2 WHEEZING: ICD-10-CM

## 2021-03-09 DIAGNOSIS — F17.200 SMOKING: ICD-10-CM

## 2021-03-09 DIAGNOSIS — R07.89 SENSATION OF CHEST PRESSURE: ICD-10-CM

## 2021-03-09 DIAGNOSIS — Z87.891 SMOKING HX: ICD-10-CM

## 2021-03-09 DIAGNOSIS — R06.02 SHORTNESS OF BREATH: Primary | ICD-10-CM

## 2021-03-09 PROCEDURE — 93000 ELECTROCARDIOGRAM COMPLETE: CPT | Performed by: NURSE PRACTITIONER

## 2021-03-09 PROCEDURE — 99214 OFFICE O/P EST MOD 30 MIN: CPT | Performed by: NURSE PRACTITIONER

## 2021-03-09 RX ORDER — VARENICLINE TARTRATE
KIT
Qty: 53 TABLET | Refills: 0 | Status: SHIPPED | OUTPATIENT
Start: 2021-03-09 | End: 2021-07-26

## 2021-03-09 RX ORDER — ALBUTEROL SULFATE 90 UG/1
2 AEROSOL, METERED RESPIRATORY (INHALATION) EVERY 6 HOURS PRN
Qty: 1 INHALER | Refills: 3 | Status: SHIPPED | OUTPATIENT
Start: 2021-03-09 | End: 2021-07-08

## 2021-03-09 RX ORDER — FLUTICASONE FUROATE, UMECLIDINIUM BROMIDE AND VILANTEROL TRIFENATATE 100; 62.5; 25 UG/1; UG/1; UG/1
1 POWDER RESPIRATORY (INHALATION) DAILY
Qty: 1 EACH | Refills: 0 | COMMUNITY
Start: 2021-03-09 | End: 2021-03-24 | Stop reason: SDUPTHER

## 2021-03-09 ASSESSMENT — ENCOUNTER SYMPTOMS
BLOOD IN STOOL: 0
ABDOMINAL PAIN: 0
TROUBLE SWALLOWING: 0
BACK PAIN: 0
SHORTNESS OF BREATH: 1
CHOKING: 0
SINUS PRESSURE: 0
SINUS PAIN: 0
EYE PAIN: 0
EYE DISCHARGE: 0
STRIDOR: 0
CHEST TIGHTNESS: 1
SORE THROAT: 0
EYE REDNESS: 0
DIARRHEA: 0
COLOR CHANGE: 0
ORTHOPNEA: 0
CONSTIPATION: 0
VOICE CHANGE: 0
COUGH: 1
VOMITING: 0
PHOTOPHOBIA: 0
EYE ITCHING: 0
WHEEZING: 0
HEMOPTYSIS: 0
NAUSEA: 0
RHINORRHEA: 0

## 2021-03-09 NOTE — PATIENT INSTRUCTIONS
Patient Education        Stopping Smoking: Care Instructions  Your Care Instructions     Cigarette smokers crave the nicotine in cigarettes. Giving it up is much harder than simply changing a habit. Your body has to stop craving the nicotine. It is hard to quit, but you can do it. There are many tools that people use to quit smoking. You may find that combining tools works best for you. There are several steps to quitting. First you get ready to quit. Then you get support to help you. After that, you learn new skills and behaviors to become a nonsmoker. For many people, a necessary step is getting and using medicine. Your doctor will help you set up the plan that best meets your needs. You may want to attend a smoking cessation program to help you quit smoking. When you choose a program, look for one that has proven success. Ask your doctor for ideas. You will greatly increase your chances of success if you take medicine as well as get counseling or join a cessation program.  Some of the changes you feel when you first quit tobacco are uncomfortable. Your body will miss the nicotine at first, and you may feel short-tempered and grumpy. You may have trouble sleeping or concentrating. Medicine can help you deal with these symptoms. You may struggle with changing your smoking habits and rituals. The last step is the tricky one: Be prepared for the smoking urge to continue for a time. This is a lot to deal with, but keep at it. You will feel better. Follow-up care is a key part of your treatment and safety. Be sure to make and go to all appointments, and call your doctor if you are having problems. It's also a good idea to know your test results and keep a list of the medicines you take. How can you care for yourself at home? · Ask your family, friends, and coworkers for support. You have a better chance of quitting if you have help and support.   · Join a support group, such as Nicotine Anonymous, for people who are trying to quit smoking. · Consider signing up for a smoking cessation program, such as the American [employer] Association's Freedom from Smoking program.  · Get text messaging support. Go to the website at www.smokefree. gov to sign up for the  program.  · Set a quit date. Pick your date carefully so that it is not right in the middle of a big deadline or stressful time. Once you quit, do not even take a puff. Get rid of all ashtrays and lighters after your last cigarette. Clean your house and your clothes so that they do not smell of smoke. · Learn how to be a nonsmoker. Think about ways you can avoid those things that make you reach for a cigarette. ? Avoid situations that put you at greatest risk for smoking. For some people, it is hard to have a drink with friends without smoking. For others, they might skip a coffee break with coworkers who smoke. ? Change your daily routine. Take a different route to work or eat a meal in a different place. · Cut down on stress. Calm yourself or release tension by doing an activity you enjoy, such as reading a book, taking a hot bath, or gardening. · Talk to your doctor or pharmacist about nicotine replacement therapy, which replaces the nicotine in your body. You still get nicotine but you do not use tobacco. Nicotine replacement products help you slowly reduce the amount of nicotine you need. These products come in several forms, many of them available over-the-counter:  ? Nicotine patches  ? Nicotine gum and lozenges  ? Nicotine inhaler  · Ask your doctor about bupropion (Wellbutrin) or varenicline (Chantix), which are prescription medicines. They do not contain nicotine. They help you by reducing withdrawal symptoms, such as stress and anxiety. · Some people find hypnosis, acupuncture, and massage helpful for ending the smoking habit. · Eat a healthy diet and get regular exercise.  Having healthy habits will help your body move past its craving for

## 2021-03-09 NOTE — PROGRESS NOTES
Chief Complaint   Patient presents with    Shortness of Breath       /78   Pulse 97   Temp 97.7 °F (36.5 °C)   Wt 270 lb (122.5 kg)   SpO2 95%   Breastfeeding No   BMI 44.93 kg/m²     HPI:  Tatyana Horta is a 73 y.o. (: 1955) here today   for   Shortness of Breath  This is a new problem. The current episode started in the past 7 days. The problem occurs intermittently. The problem has been gradually worsening. Pertinent negatives include no abdominal pain, chest pain, claudication, coryza, ear pain, fever, headaches, hemoptysis, leg pain, leg swelling, neck pain, orthopnea, PND, rash, rhinorrhea, sore throat, vomiting or wheezing. Exacerbated by: walking. Risk factors include smoking. She has tried rest (tried to change eating habit ) for the symptoms. The treatment provided no relief. Yesterday felt like someone was sitting on her chest. Denies pain on her jaw or left arm. Her  recently had a heart attack. Reviewed her blood work in the office today. Will try inhaler. She has joined a gym but cant breathe. She wants to talk about chantix. She was on this prior. She goes though 2-2.5 packs a day. She has been smoking 40 years. Patient's medications, allergies, past medical, surgical, social and family histories were reviewed and updated asappropriate. ROS:  Review of Systems   Constitutional: Positive for fatigue. Negative for activity change, appetite change, chills, diaphoresis, fever and unexpected weight change. HENT: Negative for congestion, ear discharge, ear pain, hearing loss, nosebleeds, postnasal drip, rhinorrhea, sinus pressure, sinus pain, sneezing, sore throat, tinnitus, trouble swallowing and voice change. Eyes: Negative for photophobia, pain, discharge, redness and itching. Respiratory: Positive for cough, chest tightness and shortness of breath. Negative for hemoptysis, choking, wheezing and stridor.     Cardiovascular: Negative for chest pain, palpitations, orthopnea, claudication, leg swelling and PND. Chest pressure   Gastrointestinal: Negative for abdominal pain, blood in stool, constipation, diarrhea, nausea and vomiting. Endocrine: Negative for cold intolerance, heat intolerance, polydipsia and polyuria. Genitourinary: Negative for difficulty urinating, dysuria, enuresis, flank pain, frequency, hematuria and urgency. Musculoskeletal: Negative for back pain, gait problem, joint swelling, neck pain and neck stiffness. Skin: Negative for color change, pallor, rash and wound. Allergic/Immunologic: Negative for environmental allergies and food allergies. Neurological: Negative for dizziness, tremors, syncope, speech difficulty, weakness, light-headedness, numbness and headaches. Hematological: Negative for adenopathy. Does not bruise/bleed easily. Psychiatric/Behavioral: Negative for agitation, behavioral problems, confusion, decreased concentration, dysphoric mood, hallucinations, self-injury, sleep disturbance and suicidal ideas. The patient is not nervous/anxious and is not hyperactive. Prior to Visit Medications    Medication Sig Taking? Authorizing Provider   albuterol sulfate HFA (PROAIR HFA) 108 (90 Base) MCG/ACT inhaler Inhale 2 puffs into the lungs every 6 hours as needed for Wheezing Yes FARHAT Wiseman CNP   varenicline (CHANTIX STARTING MONTH PAK) 0.5 MG X 11 & 1 MG X 42 tablet Take the 0.5 mg once daily for 3 days; then 0.5 twice daily for 4 days; then 1 mg twice daily.  Quit smoking when you start the 1 mgm tablet Yes FARHAT Wiseman CNP   fluticasone-umeclidin-vilant (TRELEGY ELLIPTA) 100-62.5-25 MCG/INH AEPB Inhale 1 puff into the lungs daily Yes FARHAT Wiseman CNP   metFORMIN (GLUCOPHAGE-XR) 500 MG extended release tablet TAKE 1 TABLET BY MOUTH DAILY (WITH BREAKFAST) Yes FARHAT Wiseman CNP   clonazePAM (KLONOPIN) 0.5 MG tablet TAKE 1 TABLET BY TWICE DAILY AS NEDED FOR ANXEITY FOR UP TO 30 DAYS Yes Eual Mason, APRN - CNP   atorvastatin (LIPITOR) 20 MG tablet Take 1 tablet by mouth nightly TAKE (1) TABLET DAILY Yes Eual Mason, APRN - CNP   citalopram (CELEXA) 40 MG tablet Take 1 tablet by mouth nightly Yes Eual Mason, APRN - CNP   mirtazapine (REMERON) 15 MG tablet TAKE 1 TABLET NIGHTLY Yes Eual Mason, APRN - CNP   potassium chloride (KLOR-CON M) 10 MEQ extended release tablet Take 2 tablets by mouth daily Yes Eual Mason, APRN - CNP   furosemide (LASIX) 20 MG tablet TAKE (1) TABLET DAILY Yes Eual Mason, APRN - CNP   Multiple Vitamins-Minerals (THERAPEUTIC MULTIVITAMIN-MINERALS) tablet Take 1 tablet by mouth daily. Yes Historical Provider, MD       Allergies   Allergen Reactions    Lisinopril      Angioedema 1/27/2017    Trazodone And Nefazodone Other (See Comments)     nightmares    Zithromax [Azithromycin Dihydrate] Nausea And Vomiting       OBJECTIVE:      BP Readings from Last 2 Encounters:   03/09/21 120/78   01/26/21 132/86       Wt Readings from Last 3 Encounters:   03/09/21 270 lb (122.5 kg)   01/26/21 266 lb 3.2 oz (120.7 kg)   08/17/20 268 lb (121.6 kg)       Physical Exam  Vitals signs reviewed. Constitutional:       General: She is not in acute distress. Appearance: Normal appearance. She is well-developed. HENT:      Head: Normocephalic and atraumatic. Right Ear: Hearing and external ear normal.      Left Ear: Hearing and external ear normal.      Nose: Nose normal.      Right Sinus: No maxillary sinus tenderness or frontal sinus tenderness. Left Sinus: No maxillary sinus tenderness or frontal sinus tenderness. Mouth/Throat:      Pharynx: No oropharyngeal exudate. Eyes:      General:         Right eye: No discharge. Left eye: No discharge. Conjunctiva/sclera: Conjunctivae normal.      Pupils: Pupils are equal, round, and reactive to light. Neck:      Musculoskeletal: Normal range of motion. Thyroid: No thyromegaly. Vascular: No JVD. Trachea: No tracheal deviation. Cardiovascular:      Rate and Rhythm: Normal rate and regular rhythm. Heart sounds: Normal heart sounds. No murmur. No friction rub. Pulmonary:      Effort: Accessory muscle usage present. No respiratory distress. Breath sounds: Decreased air movement present. No stridor. Examination of the right-upper field reveals wheezing. Examination of the left-upper field reveals wheezing. Examination of the right-middle field reveals wheezing. Examination of the left-middle field reveals wheezing. Decreased breath sounds and wheezing present. No rhonchi or rales. Comments: Chest tightness    Musculoskeletal: Normal range of motion. General: No tenderness. Lymphadenopathy:      Cervical: No cervical adenopathy. Skin:     General: Skin is warm and dry. Capillary Refill: Capillary refill takes less than 2 seconds. Findings: No rash. Neurological:      Mental Status: She is alert and oriented to person, place, and time. Sensory: Sensation is intact. Motor: Motor function is intact. Coordination: Coordination normal.   Psychiatric:         Attention and Perception: Attention and perception normal.         Mood and Affect: Mood normal.         Speech: Speech normal.         Behavior: Behavior normal. Behavior is cooperative. Thought Content: Thought content normal.         Cognition and Memory: Cognition normal.         Judgment: Judgment normal.         ASSESSMENT/PLAN:    1. Shortness of breath    - Daria Nelson MD, Pulmonary, [facility]  - albuterol sulfate HFA (PROAIR HFA) 108 (90 Base) MCG/ACT inhaler; Inhale 2 puffs into the lungs every 6 hours as needed for Wheezing  Dispense: 1 Inhaler; Refill: 3  - fluticasone-umeclidin-vilant ([place]) 100-62.5-25 MCG/INH AEPB; Inhale 1 puff into the lungs daily  Dispense: 1 each; Refill: 0  - EKG 12 Lead    2.  Sensation of chest pressure    - EKG 12 Lead- Similar to prior, possible right BBB, atrial rhythm     3. Quoc Pena MD, Pulmonary, [facility]  - albuterol sulfate HFA (PROAIR HFA) 108 (90 Base) MCG/ACT inhaler; Inhale 2 puffs into the lungs every 6 hours as needed for Wheezing  Dispense: 1 Inhaler; Refill: 3  - fluticasone-umeclidin-vilant (Lovena Schwartz) 100-62.5-25 MCG/INH AEPB; Inhale 1 puff into the lungs daily  Dispense: 1 each; Refill: 0    4. Smoking    - varenicline (CHANTIX STARTING MONTH FUAD) 0.5 MG X 11 & 1 MG X 42 tablet; Take the 0.5 mg once daily for 3 days; then 0.5 twice daily for 4 days; then 1 mg twice daily. Quit smoking when you start the 1 mgm tablet  Dispense: 53 tablet; Refill: 0    5. Smoking hx    - varenicline (CHANTIX STARTING MONTH FUAD) 0.5 MG X 11 & 1 MG X 42 tablet; Take the 0.5 mg once daily for 3 days; then 0.5 twice daily for 4 days; then 1 mg twice daily. Quit smoking when you start the 1 mgm tablet  Dispense: 53 tablet; Refill: 0    She is going to follow up with pulmonology, Take Trelegy for two weeks daily and albuterol PRN. Follow up if symptoms do not improve or worsen. If the patient becomes short of breath go straight to the ER or call 911.

## 2021-03-24 DIAGNOSIS — R06.02 SHORTNESS OF BREATH: ICD-10-CM

## 2021-03-24 DIAGNOSIS — R06.2 WHEEZING: ICD-10-CM

## 2021-03-24 RX ORDER — FLUTICASONE FUROATE, UMECLIDINIUM BROMIDE AND VILANTEROL TRIFENATATE 100; 62.5; 25 UG/1; UG/1; UG/1
1 POWDER RESPIRATORY (INHALATION) DAILY
Qty: 1 EACH | Refills: 3 | Status: SHIPPED | OUTPATIENT
Start: 2021-03-24 | End: 2021-08-12

## 2021-03-31 DIAGNOSIS — F41.0 PANIC DISORDER: ICD-10-CM

## 2021-03-31 RX ORDER — CLONAZEPAM 0.5 MG/1
TABLET ORAL
Qty: 60 TABLET | Refills: 0 | Status: SHIPPED | OUTPATIENT
Start: 2021-03-31 | End: 2021-05-07

## 2021-03-31 NOTE — TELEPHONE ENCOUNTER
Date of last refill of this med was 2/26/2021, # of pills given 60 and # of refills given 0. Their next appointment is 7/26/2021, the last date patient was seen was 3/9/2021. Does patient have medication agreement on file? No  Has drug screen been done in last 12 months if needed?  no

## 2021-05-07 DIAGNOSIS — F41.0 PANIC DISORDER: ICD-10-CM

## 2021-05-07 RX ORDER — CLONAZEPAM 0.5 MG/1
TABLET ORAL
Qty: 60 TABLET | Refills: 0 | Status: SHIPPED | OUTPATIENT
Start: 2021-05-07 | End: 2021-06-07

## 2021-06-07 DIAGNOSIS — I10 ESSENTIAL HYPERTENSION: ICD-10-CM

## 2021-06-07 DIAGNOSIS — F41.0 PANIC DISORDER: ICD-10-CM

## 2021-06-07 DIAGNOSIS — E78.5 HYPERLIPIDEMIA WITH TARGET LDL LESS THAN 130: ICD-10-CM

## 2021-06-07 RX ORDER — CLONAZEPAM 0.5 MG/1
TABLET ORAL
Qty: 60 TABLET | Refills: 0 | Status: SHIPPED | OUTPATIENT
Start: 2021-06-07 | End: 2021-07-08

## 2021-06-07 RX ORDER — ATORVASTATIN CALCIUM 20 MG/1
TABLET, FILM COATED ORAL
Qty: 30 TABLET | Refills: 3 | Status: SHIPPED | OUTPATIENT
Start: 2021-06-07 | End: 2021-10-13

## 2021-07-08 DIAGNOSIS — F41.0 PANIC DISORDER: ICD-10-CM

## 2021-07-08 DIAGNOSIS — R06.2 WHEEZING: ICD-10-CM

## 2021-07-08 DIAGNOSIS — R06.02 SHORTNESS OF BREATH: ICD-10-CM

## 2021-07-08 DIAGNOSIS — G47.9 SLEEP DISTURBANCE: ICD-10-CM

## 2021-07-08 RX ORDER — ALBUTEROL SULFATE 90 UG/1
2 AEROSOL, METERED RESPIRATORY (INHALATION) EVERY 6 HOURS PRN
Qty: 18 G | Refills: 3 | Status: SHIPPED | OUTPATIENT
Start: 2021-07-08 | End: 2021-11-12

## 2021-07-08 RX ORDER — MIRTAZAPINE 15 MG/1
TABLET, FILM COATED ORAL
Qty: 90 TABLET | Refills: 3 | Status: SHIPPED | OUTPATIENT
Start: 2021-07-08 | End: 2022-07-19

## 2021-07-08 RX ORDER — CLONAZEPAM 0.5 MG/1
TABLET ORAL
Qty: 60 TABLET | Refills: 0 | Status: SHIPPED | OUTPATIENT
Start: 2021-07-08 | End: 2021-08-12

## 2021-07-26 ENCOUNTER — OFFICE VISIT (OUTPATIENT)
Dept: FAMILY MEDICINE CLINIC | Age: 66
End: 2021-07-26
Payer: MEDICARE

## 2021-07-26 VITALS
BODY MASS INDEX: 42.99 KG/M2 | WEIGHT: 258 LBS | HEIGHT: 65 IN | DIASTOLIC BLOOD PRESSURE: 78 MMHG | OXYGEN SATURATION: 94 % | SYSTOLIC BLOOD PRESSURE: 102 MMHG | HEART RATE: 79 BPM

## 2021-07-26 DIAGNOSIS — E78.5 HYPERLIPIDEMIA WITH TARGET LDL LESS THAN 130: ICD-10-CM

## 2021-07-26 DIAGNOSIS — E11.9 TYPE 2 DIABETES MELLITUS WITHOUT COMPLICATION, WITHOUT LONG-TERM CURRENT USE OF INSULIN (HCC): ICD-10-CM

## 2021-07-26 DIAGNOSIS — Z00.00 ROUTINE GENERAL MEDICAL EXAMINATION AT A HEALTH CARE FACILITY: Primary | ICD-10-CM

## 2021-07-26 DIAGNOSIS — I10 ESSENTIAL HYPERTENSION: ICD-10-CM

## 2021-07-26 DIAGNOSIS — R20.8 BURNING SENSATION OF FEET: ICD-10-CM

## 2021-07-26 PROBLEM — K74.60 LIVER CIRRHOSIS (HCC): Status: RESOLVED | Noted: 2019-12-18 | Resolved: 2021-07-26

## 2021-07-26 PROCEDURE — 36415 COLL VENOUS BLD VENIPUNCTURE: CPT | Performed by: NURSE PRACTITIONER

## 2021-07-26 PROCEDURE — 3051F HG A1C>EQUAL 7.0%<8.0%: CPT | Performed by: NURSE PRACTITIONER

## 2021-07-26 PROCEDURE — 99214 OFFICE O/P EST MOD 30 MIN: CPT | Performed by: NURSE PRACTITIONER

## 2021-07-26 PROCEDURE — G0438 PPPS, INITIAL VISIT: HCPCS | Performed by: NURSE PRACTITIONER

## 2021-07-26 RX ORDER — GABAPENTIN 100 MG/1
100 CAPSULE ORAL DAILY PRN
Qty: 30 CAPSULE | Refills: 0 | Status: SHIPPED | OUTPATIENT
Start: 2021-07-26 | End: 2021-10-13

## 2021-07-26 ASSESSMENT — ENCOUNTER SYMPTOMS
WHEEZING: 0
VOICE CHANGE: 0
BLOOD IN STOOL: 0
SINUS PAIN: 0
EYE REDNESS: 0
TROUBLE SWALLOWING: 0
SHORTNESS OF BREATH: 0
SORE THROAT: 0
BACK PAIN: 0
PHOTOPHOBIA: 0
COUGH: 0
EYE PAIN: 0
RHINORRHEA: 0
STRIDOR: 0
ABDOMINAL PAIN: 0
VOMITING: 0
DIARRHEA: 0
EYE DISCHARGE: 0
SINUS PRESSURE: 0
COLOR CHANGE: 0
EYE ITCHING: 0
CHOKING: 0
CHEST TIGHTNESS: 0
CONSTIPATION: 0
NAUSEA: 0

## 2021-07-26 ASSESSMENT — PATIENT HEALTH QUESTIONNAIRE - PHQ9
9. THOUGHTS THAT YOU WOULD BE BETTER OFF DEAD, OR OF HURTING YOURSELF: 0
SUM OF ALL RESPONSES TO PHQ QUESTIONS 1-9: 4
SUM OF ALL RESPONSES TO PHQ QUESTIONS 1-9: 6
SUM OF ALL RESPONSES TO PHQ QUESTIONS 1-9: 6
8. MOVING OR SPEAKING SO SLOWLY THAT OTHER PEOPLE COULD HAVE NOTICED. OR THE OPPOSITE, BEING SO FIGETY OR RESTLESS THAT YOU HAVE BEEN MOVING AROUND A LOT MORE THAN USUAL: 0
4. FEELING TIRED OR HAVING LITTLE ENERGY: 2
7. TROUBLE CONCENTRATING ON THINGS, SUCH AS READING THE NEWSPAPER OR WATCHING TELEVISION: 1
SUM OF ALL RESPONSES TO PHQ9 QUESTIONS 1 & 2: 4
SUM OF ALL RESPONSES TO PHQ QUESTIONS 1-9: 4
1. LITTLE INTEREST OR PLEASURE IN DOING THINGS: 2
SUM OF ALL RESPONSES TO PHQ QUESTIONS 1-9: 6
6. FEELING BAD ABOUT YOURSELF - OR THAT YOU ARE A FAILURE OR HAVE LET YOURSELF OR YOUR FAMILY DOWN: 0
2. FEELING DOWN, DEPRESSED OR HOPELESS: 2
10. IF YOU CHECKED OFF ANY PROBLEMS, HOW DIFFICULT HAVE THESE PROBLEMS MADE IT FOR YOU TO DO YOUR WORK, TAKE CARE OF THINGS AT HOME, OR GET ALONG WITH OTHER PEOPLE: 0
3. TROUBLE FALLING OR STAYING ASLEEP: 1
SUM OF ALL RESPONSES TO PHQ QUESTIONS 1-9: 4
5. POOR APPETITE OR OVEREATING: 2

## 2021-07-26 ASSESSMENT — LIFESTYLE VARIABLES: HOW OFTEN DO YOU HAVE A DRINK CONTAINING ALCOHOL: 0

## 2021-07-26 NOTE — PATIENT INSTRUCTIONS
Personalized Preventive Plan for Vitor Sánchez - 7/26/2021  Medicare offers a range of preventive health benefits. Some of the tests and screenings are paid in full while other may be subject to a deductible, co-insurance, and/or copay. Some of these benefits include a comprehensive review of your medical history including lifestyle, illnesses that may run in your family, and various assessments and screenings as appropriate. After reviewing your medical record and screening and assessments performed today your provider may have ordered immunizations, labs, imaging, and/or referrals for you. A list of these orders (if applicable) as well as your Preventive Care list are included within your After Visit Summary for your review. Other Preventive Recommendations:    · A preventive eye exam performed by an eye specialist is recommended every 1-2 years to screen for glaucoma; cataracts, macular degeneration, and other eye disorders. · A preventive dental visit is recommended every 6 months. · Try to get at least 150 minutes of exercise per week or 10,000 steps per day on a pedometer . · Order or download the FREE \"Exercise & Physical Activity: Your Everyday Guide\" from [employer]. Call 5-526.705.2821 or search [employer] online. · You need 3256-3511 mg of calcium and 3364-5026 IU of vitamin D per day. It is possible to meet your calcium requirement with diet alone, but a vitamin D supplement is usually necessary to meet this goal.  · When exposed to the sun, use a sunscreen that protects against both UVA and UVB radiation with an SPF of 30 or greater. Reapply every 2 to 3 hours or after sweating, drying off with a towel, or swimming. · Always wear a seat belt when traveling in a car. Always wear a helmet when riding a bicycle or motorcycle.

## 2021-07-26 NOTE — PROGRESS NOTES
Medicare Annual Wellness Visit  Name: Madeleine Pepper Date: 2021   MRN: <[id number]> Sex: Female   Age: 73 y.o. Ethnicity: Non- / Non    : 1955 Race: White (non-)      Ashley Turner is here for Medicare AWV and Diabetes    Screenings for behavioral, psychosocial and functional/safety risks, and cognitive dysfunction are all negative except as indicated below. These results, as well as other patient data from the [address] form, are documented in Flowsheets linked to this Encounter. Allergies   Allergen Reactions    Lisinopril      Angioedema 2017    Trazodone And Nefazodone Other (See Comments)     nightmares    Zithromax [Azithromycin Dihydrate] Nausea And Vomiting         Prior to Visit Medications    Medication Sig Taking? Authorizing Provider   gabapentin (NEURONTIN) 100 MG capsule Take 1 capsule by mouth daily as needed (foot numbness and tingling) for up to 30 days.  Intended supply: 30 days Yes FARHAT Esquivel CNP   albuterol sulfate  (90 Base) MCG/ACT inhaler INHALE 2 PUFFS INTO THE LUNGS EVERY 6 HOURS AS NEEDED FOR WHEEZING Yes FARHAT Esquivel CNP   mirtazapine (REMERON) 15 MG tablet TAKE 1 TABLET BY MOUTH NIGHTLY Yes FARHAT Esquivel CNP   clonazePAM (KLONOPIN) 0.5 MG tablet TAKE 1 TABLET BY TWICE DAILY AS NEDED FOR ANXEITY FOR UP TO 30 DAYS Yes FARHAT Esquivel CNP   atorvastatin (LIPITOR) 20 MG tablet TAKE 1 TABLET NIGHTLY Yes FARHAT Esquivel CNP   fluticasone-umeclidin-vilant (TRELEGY ELLIPTA) 100-62.5-25 MCG/INH AEPB Inhale 1 puff into the lungs daily Yes FARHAT Esquivel CNP   metFORMIN (GLUCOPHAGE-XR) 500 MG extended release tablet TAKE 1 TABLET BY MOUTH DAILY (WITH BREAKFAST) Yes FARHAT Esquivel CNP   citalopram (CELEXA) 40 MG tablet Take 1 tablet by mouth nightly Yes FARHAT Esquivel CNP   potassium chloride (KLOR-CON M) 10 MEQ extended release tablet Take 2 tablets by mouth daily Yes Kerin Mortimer FARHAT Madsen CNP   furosemide (LASIX) 20 MG tablet TAKE (1) TABLET DAILY Yes FARHAT Darling CNP   Multiple Vitamins-Minerals (THERAPEUTIC MULTIVITAMIN-MINERALS) tablet Take 1 tablet by mouth daily.  Yes Historical Provider, MD         Past Medical History:   Diagnosis Date    Anxiety     Arthritis     Depression     Disc degeneration 2014    Frequent UTI     Hyperlipidemia     Hypertension     Liver cirrhosis ([address] 12/18/2019    Type 2 diabetes mellitus without complication, without long-term current use of insulin (HCC)        Past Surgical History:   Procedure Laterality Date    APPENDECTOMY      was 16 yrs old    CARPAL TUNNEL RELEASE      CHOLECYSTECTOMY      COLONOSCOPY      CYSTOSCOPY N/A 1/22/2020    CYSTOSCOPY, URETHRAL DILATION performed by Gordon Guillen MD at [place] [address] [place] [place] Right 4/4/12    FOOT SURGERY Right 06/12/13    FIRST METATARSAL PHALANGEAL JOINT IMPLANTATION RIGHT FOOT    HYSTERECTOMY  1980    complete and 1982;endomwtriosis;    ROTATOR CUFF REPAIR      1988    TUBAL LIGATION  1984    ULNAR TUNNEL RELEASE      UPPER GASTROINTESTINAL ENDOSCOPY N/A 12/5/2019    EGD (10:00) **FIBROSCAN TOO** performed by Rashmi Vasquez MD at [address] [facility]         Family History   Problem Relation Age of Onset    Diabetes Father     Cancer Father         colon    Cancer Sister         anal cancer, kidney cancer    High Blood Pressure Sister     Cancer Brother         stomach (unsure)    Depression Sister     High Blood Pressure Sister        CareTeam (Including outside providers/suppliers regularly involved in providing care):   Patient Care Team:  FARHAT Darling CNP as PCP - General (Nurse Practitioner)  FARHAT Darling CNP as PCP - [facility] Empaneled Provider  Rashmi Vasquez MD as Consulting Physician (Gastroenterology)    Wt Readings from Last 3 Encounters:   07/26/21 258 lb (117 kg)   03/09/21 270 lb (122.5 kg)   01/26/21 266 lb 3.2 oz (120.7 kg)     Vitals:    07/26/21 1345   BP: 102/78   Pulse: 79   SpO2: 94%   Weight: 258 lb (117 kg)   Height: 5' 5\" (1.651 m)     Body mass index is 42.93 kg/m². HPI    Chronic Issues:    HTN:  BP well controlled in office today. She is on Lasix. May need monitor for Hypotension. Signs and symptoms discussed. HLD: She is currently on Lipitor 20 mg. Cholesterol elevated at last lab draw. Will repeat today. DM: She is on metformin 500 mg daily. She is due for labs today in office. She is having stinging and burning in her feet. We will try gabapentin PRN. Review of Systems   Constitutional: Negative for activity change, appetite change, chills, diaphoresis, fatigue, fever and unexpected weight change. HENT: Negative for congestion, ear discharge, ear pain, hearing loss, nosebleeds, postnasal drip, rhinorrhea, sinus pressure, sinus pain, sneezing, sore throat, tinnitus, trouble swallowing and voice change. Eyes: Negative for photophobia, pain, discharge, redness and itching. Respiratory: Negative for cough, choking, chest tightness, shortness of breath, wheezing and stridor. Cardiovascular: Positive for leg swelling. Negative for chest pain and palpitations. Gastrointestinal: Negative for abdominal pain, blood in stool, constipation, diarrhea, nausea and vomiting. Endocrine: Negative for cold intolerance, heat intolerance, polydipsia and polyuria. Genitourinary: Negative for difficulty urinating, dysuria, enuresis, flank pain, frequency, hematuria and urgency. Musculoskeletal: Negative for back pain, gait problem, joint swelling, neck pain and neck stiffness. Skin: Negative for color change, pallor, rash and wound. Allergic/Immunologic: Negative for environmental allergies and food allergies. Neurological: Negative for dizziness, tremors, syncope, speech difficulty, weakness, light-headedness, numbness and headaches. Hematological: Negative for adenopathy.  Does not bruise/bleed Sexual Activity     Sexually Active  Yes Partners  Male               Alcohol Screening:       A score of 8 or more is associated with harmful or hazardous drinking. A score of 13 or more in women, and 15 or more in men, is likely to indicate alcohol dependence. Substance Abuse Interventions:   · Tobacco abuse:  She is taking nicorette, now doing pack and a half a day. General Health and ACP:  General  In general, how would you say your health is?: Fair  In the past 7 days, have you experienced any of the following? New or Increased Pain, New or Increased Fatigue, Loneliness, Social Isolation, Stress or Anger?: (!) New or Increased Fatigue, Stress  Do you get the social and emotional support that you need?: Yes  Do you have a Living Will?: (!) No  Advance Directives     Power of  Living Will ACP-Advance Directive ACP-Power of     Not on File Not on File Filed Not on File      General Health Risk Interventions:  · No Living Will: Patient declines ACP discussion/assistance    Health Habits/Nutrition:  Health Habits/Nutrition  Do you exercise for at least 20 minutes 2-3 times per week?: (!) No  Have you lost any weight without trying in the past 3 months?: No  Do you eat only one meal per day?: (!) Yes  Have you seen the dentist within the past year?: (!) No  Body mass index: (!) 42.93  Health Habits/Nutrition Interventions:  · Inadequate physical activity:  She states feet hurting. Advised to snack with vetgitables or fruit then eat dinner.    · Nutritional issues:  educational materials for healthy, well-balanced diet provided    Hearing/Vision:  No exam data present  Hearing/Vision  Do you or your family notice any trouble with your hearing that hasn't been managed with hearing aids?: No  Do you have difficulty driving, watching TV, or doing any of your daily activities because of your eyesight?: (!) Yes  Have you had an eye exam within the past year?: Yes  Hearing/Vision Interventions:  · Vision concerns:  patient encouraged to make appointment with his/her eye specialist, glaucoma, recently saw eye dr.     Safety:  Safety  Do you have working smoke detectors?: Yes  Have all throw rugs been removed or fastened?: Yes  Do you have non-slip mats or surfaces in all bathtubs/showers?: (!) No  Do all of your stairways have a railing or banister?: (!) No (no stairs)  Are your doorways, halls and stairs free of clutter?: Yes  Do you always fasten your seatbelt when you are in a car?: Yes  Safety Interventions:  · Home safety tips provided     Personalized Preventive Plan   Current Health Maintenance Status  Immunization History   Administered Date(s) Administered    COVID-19, Moderna, PF, 100mcg/0.5mL 03/23/2021, 04/27/2021    Influenza 02/21/2011, 01/17/2013, 08/28/2013    Influenza Virus Vaccine 11/05/2014, 10/07/2015    Influenza, Quadv, IM, PF (6 mo and older Fluzone, Flulaval, Fluarix, and 3 yrs and older Afluria) 10/05/2016    Pneumococcal Conjugate 13-valent ([id number]) 10/07/2015    Pneumococcal Polysaccharide ([id number]) 11/25/2013    Tdap (Boostrix, Adacel) 11/25/2013        Health Maintenance   Topic Date Due    Hepatitis A vaccine (1 of 2 - Risk 2-dose series) Never done    Diabetic retinal exam  Never done    HIV screen  Never done    Diabetic microalbuminuria test  Never done    Shingles Vaccine (1 of 2) Never done    DEXA (modify frequency per FRAX score)  Never done    Cervical cancer screen  08/28/2020    Pneumococcal 65+ years Vaccine (2 of 2 - PPSV23) 01/05/2021    Annual Wellness Visit (AWV)  Never done    Flu vaccine (1) 09/01/2021    A1C test (Diabetic or Prediabetic)  01/26/2022    Lipid screen  01/26/2022    Potassium monitoring  01/26/2022    Creatinine monitoring  01/26/2022    Low dose CT lung screening  02/10/2022    Diabetic foot exam  07/26/2022    Breast cancer screen  02/10/2023    DTaP/Tdap/Td vaccine (2 - Td or Tdap) 11/25/2023  Colon cancer screen colonoscopy  12/19/2023    COVID-19 Vaccine  Completed    Hepatitis C screen  Completed    Hib vaccine  Aged Out    Meningococcal (ACWY) vaccine  Aged Out     Recommendations for [employer]ight Systems Due: see orders and patient instructions/AVS.  . Recommended screening schedule for the next 5-10 years is provided to the patient in written form: see Patient Instructions/AVS.    April Callahan was seen today for medicare awv and diabetes. Diagnoses and all orders for this visit:    1. Routine general medical examination at a health care facility    2. Type 2 diabetes mellitus without complication, without long-term current use of insulin ([facility])    -  DIABETES FOOT EXAM  - MICROALBUMIN / CREATININE URINE RATIO  - Hemoglobin A1C  - Comprehensive Metabolic Panel  - gabapentin (NEURONTIN) 100 MG capsule; Take 1 capsule by mouth daily as needed (foot numbness and tingling) for up to 30 days. Intended supply: 30 days  Dispense: 30 capsule; Refill: 0  - may need to increase diabetic regimen. 3. Hyperlipidemia with target LDL less than 130    - Will assess with lab results. - Lipid Panel    4. Essential hypertension    - Lipid Panel  - Hemoglobin A1C  - Comprehensive Metabolic Panel  - Currently low, may need to decrease lasix, advised to wear compression socks and prop feet up. 5. Burning sensation of feet    - gabapentin (NEURONTIN) 100 MG capsule; Take 1 capsule by mouth daily as needed (foot numbness and tingling) for up to 30 days. Intended supply: 30 days  Dispense: 30 capsule; Refill: 0    Smoking cessation discussed. Will remain on Celexa but may want to change in the future if depression worsens or stress increases. Agreed to stay on current dose right now due to long titration period off then back on another med. Will follow up with lab results. Follow up if symptoms do not improve or worsen. If the patient becomes short of breath go straight to the ER or call 911.

## 2021-07-27 DIAGNOSIS — E11.9 TYPE 2 DIABETES MELLITUS WITHOUT COMPLICATION, WITHOUT LONG-TERM CURRENT USE OF INSULIN (HCC): ICD-10-CM

## 2021-07-27 LAB
A/G RATIO: 1.5 (ref 1.1–2.2)
ALBUMIN SERPL-MCNC: 4.2 G/DL (ref 3.4–5)
ALP BLD-CCNC: 144 U/L (ref 40–129)
ALT SERPL-CCNC: 15 U/L (ref 10–40)
ANION GAP SERPL CALCULATED.3IONS-SCNC: 12 MMOL/L (ref 3–16)
AST SERPL-CCNC: 21 U/L (ref 15–37)
BILIRUB SERPL-MCNC: 0.8 MG/DL (ref 0–1)
BUN BLDV-MCNC: 9 MG/DL (ref 7–20)
CALCIUM SERPL-MCNC: 9.9 MG/DL (ref 8.3–10.6)
CHLORIDE BLD-SCNC: 98 MMOL/L (ref 99–110)
CHOLESTEROL, TOTAL: 152 MG/DL (ref 0–199)
CO2: 33 MMOL/L (ref 21–32)
CREAT SERPL-MCNC: 0.6 MG/DL (ref 0.6–1.2)
CREATININE URINE: 159 MG/DL (ref 28–259)
ESTIMATED AVERAGE GLUCOSE: 185.8 MG/DL
GFR AFRICAN AMERICAN: >60
GFR NON-AFRICAN AMERICAN: >60
GLOBULIN: 2.8 G/DL
GLUCOSE BLD-MCNC: 144 MG/DL (ref 70–99)
HBA1C MFR BLD: 8.1 %
HDLC SERPL-MCNC: 45 MG/DL (ref 40–60)
LDL CHOLESTEROL CALCULATED: 70 MG/DL
MICROALBUMIN UR-MCNC: <1.2 MG/DL
MICROALBUMIN/CREAT UR-RTO: NORMAL MG/G (ref 0–30)
POTASSIUM SERPL-SCNC: 4.3 MMOL/L (ref 3.5–5.1)
SODIUM BLD-SCNC: 143 MMOL/L (ref 136–145)
TOTAL PROTEIN: 7 G/DL (ref 6.4–8.2)
TRIGL SERPL-MCNC: 186 MG/DL (ref 0–150)
VLDLC SERPL CALC-MCNC: 37 MG/DL

## 2021-07-27 RX ORDER — METFORMIN HYDROCHLORIDE 500 MG/1
500 TABLET, EXTENDED RELEASE ORAL 2 TIMES DAILY
Qty: 180 TABLET | Refills: 0 | Status: SHIPPED | OUTPATIENT
Start: 2021-07-27 | End: 2021-10-13

## 2021-08-11 ENCOUNTER — OFFICE VISIT (OUTPATIENT)
Dept: FAMILY MEDICINE CLINIC | Age: 66
End: 2021-08-11
Payer: MEDICARE

## 2021-08-11 VITALS
HEART RATE: 98 BPM | BODY MASS INDEX: 42.78 KG/M2 | OXYGEN SATURATION: 95 % | DIASTOLIC BLOOD PRESSURE: 64 MMHG | SYSTOLIC BLOOD PRESSURE: 132 MMHG | WEIGHT: 256.8 LBS | HEIGHT: 65 IN

## 2021-08-11 DIAGNOSIS — F41.0 PANIC DISORDER: ICD-10-CM

## 2021-08-11 DIAGNOSIS — R06.2 WHEEZING: ICD-10-CM

## 2021-08-11 DIAGNOSIS — R06.02 SHORTNESS OF BREATH: ICD-10-CM

## 2021-08-11 DIAGNOSIS — R60.9 DEPENDENT EDEMA: ICD-10-CM

## 2021-08-11 DIAGNOSIS — H40.9 GLAUCOMA, UNSPECIFIED GLAUCOMA TYPE, UNSPECIFIED LATERALITY: ICD-10-CM

## 2021-08-11 DIAGNOSIS — J01.00 ACUTE MAXILLARY SINUSITIS, RECURRENCE NOT SPECIFIED: Primary | ICD-10-CM

## 2021-08-11 DIAGNOSIS — K11.20 PAROTIDITIS: ICD-10-CM

## 2021-08-11 PROCEDURE — 99213 OFFICE O/P EST LOW 20 MIN: CPT | Performed by: FAMILY MEDICINE

## 2021-08-11 RX ORDER — AMOXICILLIN 875 MG/1
875 TABLET, COATED ORAL 2 TIMES DAILY
Qty: 20 TABLET | Refills: 0 | Status: SHIPPED | OUTPATIENT
Start: 2021-08-11 | End: 2021-08-21

## 2021-08-11 ASSESSMENT — ENCOUNTER SYMPTOMS: SORE THROAT: 1

## 2021-08-11 NOTE — PROGRESS NOTES
Chief Complaint   Patient presents with    Headache    Facial Swelling    Otalgia       HPI:  Dima Ochoa is a 73 y.o. (: 1955) here today   for headaches, swelling to left side of her face and ear pain. Symptoms have been present the last 4 days. Otalgia   There is pain in the left ear. This is a new problem. The current episode started in the past 7 days (3-4 days). There has been no fever. Associated symptoms include headaches and a sore throat (mild). Pertinent negatives include no ear discharge. She has tried nothing for the symptoms. has had headaches. That has improved. Still w/ pain in left ear and jaw, swelling as well. Headaches feel more like migraine. Has had some blurred vision w/ headaches. Has not needed albuterol often. Hx of glaucoma and dm. Hx of liver issues. Does not take otc meds     Patient's medications, allergies, past medical, surgical, social and family histories were reviewed and updated asappropriate. ROS:  Review of Systems   Constitutional: Negative for fever. HENT: Positive for ear pain and sore throat (mild). Negative for ear discharge. Eyes: Positive for visual disturbance. Neurological: Positive for headaches. Prior to Visit Medications    Medication Sig Taking? Authorizing Provider   amoxicillin (AMOXIL) 875 MG tablet Take 1 tablet by mouth 2 times daily for 10 days Yes Sathya Hayward MD   metFORMIN (GLUCOPHAGE-XR) 500 MG extended release tablet Take 1 tablet by mouth 2 times daily Yes FARHAT Pizarro CNP   gabapentin (NEURONTIN) 100 MG capsule Take 1 capsule by mouth daily as needed (foot numbness and tingling) for up to 30 days.  Intended supply: 30 days Yes FARHAT Pizarro CNP   albuterol sulfate  (90 Base) MCG/ACT inhaler INHALE 2 PUFFS INTO THE LUNGS EVERY 6 HOURS AS NEEDED FOR WHEEZING Yes FARHAT Pizarro CNP   mirtazapine (REMERON) 15 MG tablet TAKE 1 TABLET BY MOUTH NIGHTLY Yes Devika Kaur FARHAT - CNP   clonazePAM (KLONOPIN) 0.5 MG tablet TAKE 1 TABLET BY TWICE DAILY AS NEDED FOR ANXEITY FOR UP TO 30 DAYS Yes FARHAT Lubin CNP   atorvastatin (LIPITOR) 20 MG tablet TAKE 1 TABLET NIGHTLY Yes FARHAT Lubin CNP   fluticasone-umeclidin-vilant (TRELEGY ELLIPTA) 100-62.5-25 MCG/INH AEPB Inhale 1 puff into the lungs daily Yes FARHAT Lubin CNP   citalopram (CELEXA) 40 MG tablet Take 1 tablet by mouth nightly Yes FARHAT Lubin CNP   potassium chloride (KLOR-CON M) 10 MEQ extended release tablet Take 2 tablets by mouth daily Yes FARHAT Lubin CNP   furosemide (LASIX) 20 MG tablet TAKE (1) TABLET DAILY Yes FARHAT Lubin CNP   Multiple Vitamins-Minerals (THERAPEUTIC MULTIVITAMIN-MINERALS) tablet Take 1 tablet by mouth daily. Yes Historical Provider, MD       Allergies   Allergen Reactions    Lisinopril      Angioedema 1/27/2017    Trazodone And Nefazodone Other (See Comments)     nightmares    Zithromax [Azithromycin Dihydrate] Nausea And Vomiting       OBJECTIVE:    /64   Pulse 98   Ht 5' 5\" (1.651 m)   Wt 256 lb 12.8 oz (116.5 kg)   SpO2 95%   BMI 42.73 kg/m²     BP Readings from Last 2 Encounters:   08/11/21 132/64   07/26/21 102/78       Wt Readings from Last 3 Encounters:   08/11/21 256 lb 12.8 oz (116.5 kg)   07/26/21 258 lb (117 kg)   03/09/21 270 lb (122.5 kg)       Physical Exam  Constitutional:       Appearance: She is obese. HENT:      Head: Normocephalic and atraumatic. Right Ear: Tympanic membrane is scarred and perforated. Left Ear: Tympanic membrane is scarred and perforated. Nose:      Left Sinus: Maxillary sinus tenderness present. Cardiovascular:      Rate and Rhythm: Normal rate and regular rhythm. Pulmonary:      Effort: Pulmonary effort is normal.      Breath sounds: Normal breath sounds. Neurological:      General: No focal deficit present.       Mental Status: She is alert and oriented to person, place, and time.   Psychiatric:         Mood and Affect: Mood normal.         Behavior: Behavior normal.           ASSESSMENT/PLAN:     1. Acute maxillary sinusitis, recurrence not specified  Possible parotiditis on the left. abx as below. Call if fails to improve. - amoxicillin (AMOXIL) 875 MG tablet; Take 1 tablet by mouth 2 times daily for 10 days  Dispense: 20 tablet; Refill: 0    2. Parotiditis  See above. - amoxicillin (AMOXIL) 875 MG tablet; Take 1 tablet by mouth 2 times daily for 10 days  Dispense: 20 tablet; Refill: 0    3. Glaucoma, unspecified glaucoma type, unspecified laterality  Per hx.  rec f/u w/ eye  Discussed CE[facility].   Pt to call if needs referral.

## 2021-08-11 NOTE — PATIENT INSTRUCTIONS
Patient Education        Stopping Smoking: Care Instructions  Your Care Instructions     Cigarette smokers crave the nicotine in cigarettes. Giving it up is much harder than simply changing a habit. Your body has to stop craving the nicotine. It is hard to quit, but you can do it. There are many tools that people use to quit smoking. You may find that combining tools works best for you. There are several steps to quitting. First you get ready to quit. Then you get support to help you. After that, you learn new skills and behaviors to become a nonsmoker. For many people, a necessary step is getting and using medicine. Your doctor will help you set up the plan that best meets your needs. You may want to attend a smoking cessation program to help you quit smoking. When you choose a program, look for one that has proven success. Ask your doctor for ideas. You will greatly increase your chances of success if you take medicine as well as get counseling or join a cessation program.  Some of the changes you feel when you first quit tobacco are uncomfortable. Your body will miss the nicotine at first, and you may feel short-tempered and grumpy. You may have trouble sleeping or concentrating. Medicine can help you deal with these symptoms. You may struggle with changing your smoking habits and rituals. The last step is the tricky one: Be prepared for the smoking urge to continue for a time. This is a lot to deal with, but keep at it. You will feel better. Follow-up care is a key part of your treatment and safety. Be sure to make and go to all appointments, and call your doctor if you are having problems. It's also a good idea to know your test results and keep a list of the medicines you take. How can you care for yourself at home? · Ask your family, friends, and coworkers for support. You have a better chance of quitting if you have help and support.   · Join a support group, such as Nicotine Anonymous, for people who are trying to quit smoking. · Consider signing up for a smoking cessation program, such as the American Lung Association's Freedom from Smoking program.  · Get text messaging support. Go to the website at www.smokefree. gov to sign up for the [facility] program.  · Set a quit date. Pick your date carefully so that it is not right in the middle of a big deadline or stressful time. Once you quit, do not even take a puff. Get rid of all ashtrays and lighters after your last cigarette. Clean your house and your clothes so that they do not smell of smoke. · Learn how to be a nonsmoker. Think about ways you can avoid those things that make you reach for a cigarette. ? Avoid situations that put you at greatest risk for smoking. For some people, it is hard to have a drink with friends without smoking. For others, they might skip a coffee break with coworkers who smoke. ? Change your daily routine. Take a different route to work or eat a meal in a different place. · Cut down on stress. Calm yourself or release tension by doing an activity you enjoy, such as reading a book, taking a hot bath, or gardening. · Talk to your doctor or pharmacist about nicotine replacement therapy, which replaces the nicotine in your body. You still get nicotine but you do not use tobacco. Nicotine replacement products help you slowly reduce the amount of nicotine you need. These products come in several forms, many of them available over-the-counter:  ? Nicotine patches  ? Nicotine gum and lozenges  ? Nicotine inhaler  · Ask your doctor about bupropion (Wellbutrin) or varenicline (Chantix), which are prescription medicines. They do not contain nicotine. They help you by reducing withdrawal symptoms, such as stress and anxiety. · Some people find hypnosis, acupuncture, and massage helpful for ending the smoking habit. · Eat a healthy diet and get regular exercise.  Having healthy habits will help your body move past its craving for nicotine. · Be prepared to keep trying. Most people are not successful the first few times they try to quit. Do not get mad at yourself if you smoke again. Make a list of things you learned and think about when you want to try again, such as next week, next month, or next year. Where can you learn more? Go to https://[employer]en[employer]pej[employer]niorewtheresa.[employer]. org and sign in to your [employer] account. Enter B398 in the [place] box to learn more about \"Stopping Smoking: Care Instructions. \"     If you do not have an account, please click on the \"Sign Up Now\" link. Current as of: February 11, 2021               Content Version: 12.9  © 2006-2021 Healthwise, Incorporated. Care instructions adapted under license by [facility] ([facility]). If you have questions about a medical condition or this instruction, always ask your healthcare professional. [address] any warranty or liability for your use of this information.

## 2021-08-11 NOTE — TELEPHONE ENCOUNTER
Date of last refill of this med was 6/7/2021, # of pills given 60 and # of refills given 0. Their next appointment is 10/27/2021, the last date patient was seen was today. Does patient have medication agreement on file? No  Has drug screen been done in last 12 months if needed?  no

## 2021-08-12 RX ORDER — FLUTICASONE FUROATE, UMECLIDINIUM BROMIDE AND VILANTEROL TRIFENATATE 100; 62.5; 25 UG/1; UG/1; UG/1
POWDER RESPIRATORY (INHALATION)
Qty: 1 EACH | Refills: 11 | Status: SHIPPED | OUTPATIENT
Start: 2021-08-12 | End: 2021-11-17 | Stop reason: SDUPTHER

## 2021-08-12 RX ORDER — CLONAZEPAM 0.5 MG/1
TABLET ORAL
Qty: 60 TABLET | Refills: 0 | Status: SHIPPED | OUTPATIENT
Start: 2021-08-12 | End: 2021-09-13

## 2021-08-12 RX ORDER — FUROSEMIDE 20 MG/1
TABLET ORAL
Qty: 30 TABLET | Refills: 11 | Status: SHIPPED | OUTPATIENT
Start: 2021-08-12 | End: 2022-08-16

## 2021-09-13 DIAGNOSIS — F41.0 PANIC DISORDER: ICD-10-CM

## 2021-09-13 RX ORDER — CLONAZEPAM 0.5 MG/1
TABLET ORAL
Qty: 60 TABLET | Refills: 0 | Status: SHIPPED | OUTPATIENT
Start: 2021-09-13 | End: 2021-10-13

## 2021-09-22 ENCOUNTER — OFFICE VISIT (OUTPATIENT)
Dept: FAMILY MEDICINE CLINIC | Age: 66
End: 2021-09-22
Payer: MEDICARE

## 2021-09-22 VITALS
HEART RATE: 82 BPM | SYSTOLIC BLOOD PRESSURE: 118 MMHG | BODY MASS INDEX: 42.28 KG/M2 | DIASTOLIC BLOOD PRESSURE: 88 MMHG | OXYGEN SATURATION: 97 % | WEIGHT: 253.8 LBS | HEIGHT: 65 IN

## 2021-09-22 DIAGNOSIS — R06.02 SHORTNESS OF BREATH: ICD-10-CM

## 2021-09-22 DIAGNOSIS — R30.0 DYSURIA: ICD-10-CM

## 2021-09-22 DIAGNOSIS — R35.0 URINARY FREQUENCY: Primary | ICD-10-CM

## 2021-09-22 DIAGNOSIS — F41.9 ANXIETY: ICD-10-CM

## 2021-09-22 DIAGNOSIS — E11.9 TYPE 2 DIABETES MELLITUS WITHOUT COMPLICATION, WITHOUT LONG-TERM CURRENT USE OF INSULIN (HCC): ICD-10-CM

## 2021-09-22 LAB
BILIRUBIN, POC: NORMAL
BLOOD URINE, POC: NORMAL
CLARITY, POC: CLEAR
COLOR, POC: YELLOW
GLUCOSE URINE, POC: NEGATIVE
KETONES, POC: NORMAL
LEUKOCYTE EST, POC: NEGATIVE
NITRITE, POC: NEGATIVE
PH, POC: 6
PROTEIN, POC: NORMAL
SPECIFIC GRAVITY, POC: 1.01
UROBILINOGEN, POC: 1

## 2021-09-22 PROCEDURE — 99214 OFFICE O/P EST MOD 30 MIN: CPT | Performed by: FAMILY MEDICINE

## 2021-09-22 PROCEDURE — 81002 URINALYSIS NONAUTO W/O SCOPE: CPT | Performed by: FAMILY MEDICINE

## 2021-09-22 PROCEDURE — 3052F HG A1C>EQUAL 8.0%<EQUAL 9.0%: CPT | Performed by: FAMILY MEDICINE

## 2021-09-22 RX ORDER — FLUTICASONE FUROATE, UMECLIDINIUM BROMIDE AND VILANTEROL TRIFENATATE 100; 62.5; 25 UG/1; UG/1; UG/1
1 POWDER RESPIRATORY (INHALATION) DAILY
Qty: 1 EACH | Refills: 0 | COMMUNITY
Start: 2021-09-22 | End: 2021-11-17 | Stop reason: SDUPTHER

## 2021-09-22 RX ORDER — CIPROFLOXACIN 250 MG/1
250 TABLET, FILM COATED ORAL 2 TIMES DAILY
Qty: 10 TABLET | Refills: 0 | Status: SHIPPED | OUTPATIENT
Start: 2021-09-22 | End: 2021-09-27

## 2021-09-22 ASSESSMENT — ENCOUNTER SYMPTOMS
CONSTIPATION: 0
DIARRHEA: 0

## 2021-09-22 NOTE — PROGRESS NOTES
Kelsey Arevalo MD   clonazePAM (KLONOPIN) 0.5 MG tablet TAKE 1 TABLET BY TWICE DAILY AS NEDED FOR ANXEITY FOR UP TO 30 DAYS Yes FARHAT Barrera CNP   furosemide (LASIX) 20 MG tablet TAKE (1) TABLET DAILY Yes FARHAT Barrera CNP   TRELEGY ELLIPTA 100-62.5-25 MCG/INH AEPB INHALE 1 PUFF ONCE DAILY Yes FARHAT Barrera CNP   metFORMIN (GLUCOPHAGE-XR) 500 MG extended release tablet Take 1 tablet by mouth 2 times daily Yes FARHAT Barrera - CNP   albuterol sulfate  (90 Base) MCG/ACT inhaler INHALE 2 PUFFS INTO THE LUNGS EVERY 6 HOURS AS NEEDED FOR WHEEZING Yes FARHAT Barrera CNP   mirtazapine (REMERON) 15 MG tablet TAKE 1 TABLET BY MOUTH NIGHTLY Yes FARHAT Barrera CNP   atorvastatin (LIPITOR) 20 MG tablet TAKE 1 TABLET NIGHTLY Yes FARHAT Barrera CNP   citalopram (CELEXA) 40 MG tablet Take 1 tablet by mouth nightly Yes FARHAT Barrera CNP   potassium chloride (KLOR-CON M) 10 MEQ extended release tablet Take 2 tablets by mouth daily Yes FARHAT Barrera CNP   Multiple Vitamins-Minerals (THERAPEUTIC MULTIVITAMIN-MINERALS) tablet Take 1 tablet by mouth daily. Yes Historical Provider, MD   gabapentin (NEURONTIN) 100 MG capsule Take 1 capsule by mouth daily as needed (foot numbness and tingling) for up to 30 days.  Intended supply: 30 days  Patient not taking: Reported on 9/22/2021  FARHAT Barrera CNP       Allergies   Allergen Reactions    Lisinopril      Angioedema 1/27/2017    Trazodone And Nefazodone Other (See Comments)     nightmares    Zithromax [Azithromycin Dihydrate] Nausea And Vomiting       OBJECTIVE:    /88   Pulse 82   Ht 5' 5\" (1.651 m)   Wt 253 lb 12.8 oz (115.1 kg)   SpO2 97%   BMI 42.23 kg/m²     BP Readings from Last 2 Encounters:   09/22/21 118/88   08/11/21 132/64       Wt Readings from Last 3 Encounters:   09/22/21 253 lb 12.8 oz (115.1 kg)   08/11/21 256 lb 12.8 oz (116.5 kg)   07/26/21 258 lb (117 kg)       Physical

## 2021-09-22 NOTE — PATIENT INSTRUCTIONS
Patient Education        Stopping Smoking: Care Instructions  Your Care Instructions     Cigarette smokers crave the nicotine in cigarettes. Giving it up is much harder than simply changing a habit. Your body has to stop craving the nicotine. It is hard to quit, but you can do it. There are many tools that people use to quit smoking. You may find that combining tools works best for you. There are several steps to quitting. First you get ready to quit. Then you get support to help you. After that, you learn new skills and behaviors to become a nonsmoker. For many people, a necessary step is getting and using medicine. Your doctor will help you set up the plan that best meets your needs. You may want to attend a smoking cessation program to help you quit smoking. When you choose a program, look for one that has proven success. Ask your doctor for ideas. You will greatly increase your chances of success if you take medicine as well as get counseling or join a cessation program.  Some of the changes you feel when you first quit tobacco are uncomfortable. Your body will miss the nicotine at first, and you may feel short-tempered and grumpy. You may have trouble sleeping or concentrating. Medicine can help you deal with these symptoms. You may struggle with changing your smoking habits and rituals. The last step is the tricky one: Be prepared for the smoking urge to continue for a time. This is a lot to deal with, but keep at it. You will feel better. Follow-up care is a key part of your treatment and safety. Be sure to make and go to all appointments, and call your doctor if you are having problems. It's also a good idea to know your test results and keep a list of the medicines you take. How can you care for yourself at home? · Ask your family, friends, and coworkers for support. You have a better chance of quitting if you have help and support.   · Join a support group, such as Nicotine Anonymous, for people who are trying to quit smoking. · Consider signing up for a smoking cessation program, such as the American Lung Association's Freedom from Smoking program.  · Get text messaging support. Go to the website at www.smokefree. gov to sign up for the [facility] program.  · Set a quit date. Pick your date carefully so that it is not right in the middle of a big deadline or stressful time. Once you quit, do not even take a puff. Get rid of all ashtrays and lighters after your last cigarette. Clean your house and your clothes so that they do not smell of smoke. · Learn how to be a nonsmoker. Think about ways you can avoid those things that make you reach for a cigarette. ? Avoid situations that put you at greatest risk for smoking. For some people, it is hard to have a drink with friends without smoking. For others, they might skip a coffee break with coworkers who smoke. ? Change your daily routine. Take a different route to work or eat a meal in a different place. · Cut down on stress. Calm yourself or release tension by doing an activity you enjoy, such as reading a book, taking a hot bath, or gardening. · Talk to your doctor or pharmacist about nicotine replacement therapy, which replaces the nicotine in your body. You still get nicotine but you do not use tobacco. Nicotine replacement products help you slowly reduce the amount of nicotine you need. These products come in several forms, many of them available over-the-counter:  ? Nicotine patches  ? Nicotine gum and lozenges  ? Nicotine inhaler  · Ask your doctor about bupropion (Wellbutrin) or varenicline (Chantix), which are prescription medicines. They do not contain nicotine. They help you by reducing withdrawal symptoms, such as stress and anxiety. · Some people find hypnosis, acupuncture, and massage helpful for ending the smoking habit. · Eat a healthy diet and get regular exercise.  Having healthy habits will help your body move past its craving for nicotine. · Be prepared to keep trying. Most people are not successful the first few times they try to quit. Do not get mad at yourself if you smoke again. Make a list of things you learned and think about when you want to try again, such as next week, next month, or next year. Where can you learn more? Go to https://[facility] Holdingspe[facility]niorewtheresa.[employer]. org and sign in to your [employer] account. Enter V126 in the [place] box to learn more about \"Stopping Smoking: Care Instructions. \"     If you do not have an account, please click on the \"Sign Up Now\" link. Current as of: February 11, 2021               Content Version: 12.9  © 2006-2021 Healthwise, Incorporated. Care instructions adapted under license by [facility] ([facility]). If you have questions about a medical condition or this instruction, always ask your healthcare professional. [address] any warranty or liability for your use of this information.

## 2021-09-23 LAB — URINE CULTURE, ROUTINE: NORMAL

## 2021-10-13 DIAGNOSIS — R20.8 BURNING SENSATION OF FEET: ICD-10-CM

## 2021-10-13 DIAGNOSIS — E78.5 HYPERLIPIDEMIA WITH TARGET LDL LESS THAN 130: ICD-10-CM

## 2021-10-13 DIAGNOSIS — F41.0 PANIC DISORDER: ICD-10-CM

## 2021-10-13 DIAGNOSIS — I10 ESSENTIAL HYPERTENSION: ICD-10-CM

## 2021-10-13 DIAGNOSIS — E11.9 TYPE 2 DIABETES MELLITUS WITHOUT COMPLICATION, WITHOUT LONG-TERM CURRENT USE OF INSULIN (HCC): ICD-10-CM

## 2021-10-13 RX ORDER — CLONAZEPAM 0.5 MG/1
TABLET ORAL
Qty: 60 TABLET | Refills: 0 | Status: SHIPPED | OUTPATIENT
Start: 2021-10-13 | End: 2021-11-12

## 2021-10-13 RX ORDER — METFORMIN HYDROCHLORIDE 500 MG/1
500 TABLET, EXTENDED RELEASE ORAL 2 TIMES DAILY
Qty: 180 TABLET | Refills: 0 | Status: SHIPPED | OUTPATIENT
Start: 2021-10-13 | End: 2021-11-12

## 2021-10-13 RX ORDER — GABAPENTIN 100 MG/1
CAPSULE ORAL
Qty: 30 CAPSULE | Refills: 0 | Status: SHIPPED | OUTPATIENT
Start: 2021-10-13 | End: 2021-11-12

## 2021-10-13 RX ORDER — ATORVASTATIN CALCIUM 20 MG/1
TABLET, FILM COATED ORAL
Qty: 30 TABLET | Refills: 3 | Status: SHIPPED | OUTPATIENT
Start: 2021-10-13 | End: 2022-02-08

## 2021-10-27 ENCOUNTER — OFFICE VISIT (OUTPATIENT)
Dept: PULMONOLOGY | Age: 66
End: 2021-10-27
Payer: MEDICARE

## 2021-10-27 VITALS
WEIGHT: 248.4 LBS | HEART RATE: 93 BPM | HEIGHT: 65 IN | RESPIRATION RATE: 20 BRPM | BODY MASS INDEX: 41.38 KG/M2 | SYSTOLIC BLOOD PRESSURE: 128 MMHG | DIASTOLIC BLOOD PRESSURE: 62 MMHG | OXYGEN SATURATION: 96 %

## 2021-10-27 DIAGNOSIS — J44.9 CHRONIC OBSTRUCTIVE PULMONARY DISEASE, UNSPECIFIED COPD TYPE (HCC): Primary | ICD-10-CM

## 2021-10-27 DIAGNOSIS — Z87.891 PERSONAL HISTORY OF TOBACCO USE: ICD-10-CM

## 2021-10-27 PROCEDURE — 99204 OFFICE O/P NEW MOD 45 MIN: CPT | Performed by: INTERNAL MEDICINE

## 2021-10-27 RX ORDER — UMECLIDINIUM BROMIDE AND VILANTEROL TRIFENATATE 62.5; 25 UG/1; UG/1
1 POWDER RESPIRATORY (INHALATION) DAILY
Qty: 1 EACH | Refills: 0 | COMMUNITY
Start: 2021-10-27 | End: 2021-11-17

## 2021-10-27 RX ORDER — FLUTICASONE FUROATE, UMECLIDINIUM BROMIDE AND VILANTEROL TRIFENATATE 100; 62.5; 25 UG/1; UG/1; UG/1
1 POWDER RESPIRATORY (INHALATION) DAILY
Qty: 1 EACH | Refills: 0 | COMMUNITY
Start: 2021-10-27 | End: 2022-01-05 | Stop reason: SDUPTHER

## 2021-10-27 NOTE — PROGRESS NOTES
Low Dose CT (LDCT) Lung Screening criteria met:     Age 55-77(Medicare) or 50-80 ([facility])   Pack year smoking >30 (Medicare) or >20 ([facility])   Still smoking or less than 15 year since quit   No sign or symptoms of lung cancer   > 11 months since last LDCT     Risks and benefits of lung cancer screening with LDCT scans discussed:    Significance of positive screen - False-positive LDCT results often occur. 95% of all positive results do not lead to a diagnosis of cancer. Usually further imaging can resolve most false-positive results; however, some patients may require invasive procedures. Over diagnosis risk - 10% to 12% of screen-detected lung cancer cases are over diagnosed--that is, the cancer would not have been detected in the patient's lifetime without the screening. Need for follow up screens annually to continue lung cancer screening effectiveness     Risks associated with radiation from annual LDCT- Radiation exposure is about the same as for a mammogram, which is about 1/3 of the annual background radiation exposure from everyday life. Starting screening at age 54 is not likely to increase cancer risk from radiation exposure. Patients with comorbidities resulting in life expectancy of < 10 years, or that would preclude treatment of an abnormality identified on CT, should not be screened due to lack of benefit.     To obtain maximal benefit from this screening, smoking cessation and long-term abstinence from smoking is critical

## 2021-10-27 NOTE — PROGRESS NOTES
PULMONARY, CRITICAL CARE AND SLEEP MEDICINE   CC: Shortness of breath & wheezing    Referring Provider: Tamika Winters is being seen at the request of Viviane Tineo for a consultation for shortness of breath & wheezing. Presenting HPI: Tamika Winters is a 72 yo with a past medical history liver cirrhosis and emphysema who presents today with a history of increasing severe shortness of breath worse with exertion, associated with chest tightness, treated with Trelegy with improvement. Past Medical History:   Diagnosis Date    Anxiety     Arthritis     Depression     Disc degeneration 2014    Frequent UTI     Hyperlipidemia     Hypertension     Liver cirrhosis ([facility] [address] 12/18/2019    Type 2 diabetes mellitus without complication, without long-term current use of insulin (HCC)        Past Surgical History:   Procedure Laterality Date    APPENDECTOMY      was 16 yrs old    CARPAL TUNNEL RELEASE      CHOLECYSTECTOMY      COLONOSCOPY      CYSTOSCOPY N/A 1/22/2020    CYSTOSCOPY, URETHRAL DILATION performed by Carlos Alberto Chappell MD at [address] [facility] [place] Right 4/4/12    FOOT SURGERY Right 06/12/13    FIRST METATARSAL PHALANGEAL JOINT IMPLANTATION RIGHT FOOT    HYSTERECTOMY  1980    complete and 1982;endomwtriosis;   [address]    TUBAL LIGATION  1984    ULNAR TUNNEL RELEASE      UPPER GASTROINTESTINAL ENDOSCOPY N/A 12/5/2019    EGD (10:00) **FIBROSCAN TOO** performed by Izabel Mahmood MD at [place] [id number].   Allergen Reactions    Lisinopril      Angioedema 1/27/2017    Trazodone And Nefazodone Other (See Comments)     nightmares    Zithromax [Azithromycin Dihydrate] Nausea And Vomiting       Medication list was reviewed and updated as needed in Epic.     reports that she has been smoking cigarettes. She has a 112.50 pack-year smoking history.  She has never used smokeless tobacco.    family history includes Cancer in her brother, father, and sister; Depression in her sister; Diabetes in her father; High Blood Pressure in her sister and sister. Review of Systems: Complete Review of system reviewed with patient and noted on attached review of system sheet. PHYSICAL EXAM:  Blood pressure 128/62, pulse 93, resp. rate 20, height 5' 5\" (1.651 m), weight 248 lb 6.4 oz (112.7 kg), SpO2 96 %, not currently breastfeeding.'  Constitutional:  No acute distress. HENT:  Oropharynx is clear and moist. No thyromegaly. Eyes:  Conjunctivae are normal. Pupils equal, round, and reactive to light. No scleral icterus. Neck:  No tracheal deviation present. No obvious thyroid mass. CV:  Normal rate, regular rhythm, normal heart sounds. No right ventricular heave. No lower extremity edema. Pulm/Chest:  No wheezes. No rales. Chest wall is not dull to percussion. No accessory muscle usage or stridor. Abdominal:  Soft. Bowel sounds present. No distension or hernia. No tenderness. Musculoskeletal:  No cyanosis. No clubbing. No obvious joint deformity. Lymphatic:  No cervical or supraclavicular adenopathy. Skin:  Skin is warm and dry. No rash or nodules on the exposed extremities. Psychiatric:  Normal mood and affect. Behavior is normal.  No anxiety. Neurological:  Alert, awake and oriented. PERRL. Speech fluent    DATA:  LDCT 2/10/2021  Mediastinum: Coronary artery calcifications are a marker of atherosclerosis. There are no enlarged thoracic lymph nodes.       Lungs/pleura: The tracheobronchial tree is patent. Shelda Clock is no pneumothorax   or pleural effusion.  Mild emphysema involves the bilateral upper lungs.       There are 2-3 mm solid pulmonary nodules scattered throughout the bilateral   lungs.      ASSESSMENT:  · COPD/pulmonary emphysema  · Pulmonary nodules, 2 to 3 mm on LDCT  · Greater than 100-pack-year tobacco history, has cut down from 3 pack down to 2 ppd    PLAN:  · Please give information for patient assistance program for Trelegy  · Full PFT

## 2021-10-27 NOTE — PATIENT INSTRUCTIONS
Try replacing trelegy with anoro once daily to see if breathing is just as good but results in less hoarseness. Decide between trelegy and anoro and then apply for patient assistance program.   What is lung cancer screening? Lung cancer screening is a way in which doctors check the lungs for early signs of cancer in people who have no symptoms of lung cancer. A low-dose CT scan uses much less radiation than a normal CT scan and shows a more detailed image of the lungs than a standard X-ray. The goal of lung cancer screening is to find cancer early, before it has a chance to grow, spread, or cause problems. One large study found that smokers who were screened with low-dose CT scans were less likely to die of lung cancer than those who were screened with standard X-ray. Below is a summary of the things you need to know regarding screening for lung cancer with low-dose computed tomography (LDCT). This is a screening program that involves routine annual screening with LDCT studies of the lung. The LDCTs are done using low-dose radiation that is not thought to increase your cancer risk. If you have other serious medical conditions (other cancers, congestive heart failure) that limit your life expectancy to less than 10 years, you should not undergo lung cancer screening with LDCT. The chance is 20%-60% that the LDCT result will show abnormalities. This would require additional testing which could include repeat imaging or even invasive procedures. Most (about 95%) of \"abnormal\" LDCT results are false in the sense that no lung cancer is ultimately found. Additionally, some (about 10%) of the cancers found would not affect your life expectancy, even if undetected and untreated. If you are still smoking, the single most important thing that you can do to reduce your risk of dying of lung cancer is to quit. For this screening to be covered by Medicare and most other insurers, strict criteria must be met. If you do not meet these criteria, but still wish to undergo LDCT testing, you will be required to sign a waiver indicating your willingness to pay for the scan.

## 2021-11-08 ENCOUNTER — TELEPHONE (OUTPATIENT)
Dept: FAMILY MEDICINE CLINIC | Age: 66
End: 2021-11-08

## 2021-11-08 ENCOUNTER — HOSPITAL ENCOUNTER (EMERGENCY)
Age: 66
Discharge: HOME | End: 2021-11-08
Attending: EMERGENCY MEDICINE
Payer: MEDICARE

## 2021-11-08 ENCOUNTER — APPOINTMENT (OUTPATIENT)
Dept: GENERAL RADIOLOGY | Age: 66
End: 2021-11-08
Payer: MEDICARE

## 2021-11-08 ENCOUNTER — APPOINTMENT (OUTPATIENT)
Dept: CT IMAGING | Age: 66
End: 2021-11-08
Payer: MEDICARE

## 2021-11-08 VITALS
OXYGEN SATURATION: 97 % | TEMPERATURE: 98.2 F | BODY MASS INDEX: 41.32 KG/M2 | HEART RATE: 83 BPM | HEIGHT: 65 IN | SYSTOLIC BLOOD PRESSURE: 116 MMHG | WEIGHT: 248 LBS | DIASTOLIC BLOOD PRESSURE: 87 MMHG | RESPIRATION RATE: 18 BRPM

## 2021-11-08 DIAGNOSIS — J44.1 COPD EXACERBATION (HCC): ICD-10-CM

## 2021-11-08 DIAGNOSIS — R07.9 CHEST PAIN, UNSPECIFIED TYPE: Primary | ICD-10-CM

## 2021-11-08 LAB
A/G RATIO: 1.3 (ref 1.1–2.2)
ALBUMIN SERPL-MCNC: 4.3 G/DL (ref 3.4–5)
ALP BLD-CCNC: 142 U/L (ref 40–129)
ALT SERPL-CCNC: 23 U/L (ref 10–40)
ANION GAP SERPL CALCULATED.3IONS-SCNC: 11 MMOL/L (ref 3–16)
AST SERPL-CCNC: 28 U/L (ref 15–37)
BASOPHILS ABSOLUTE: 0.2 K/UL (ref 0–0.2)
BASOPHILS RELATIVE PERCENT: 2 %
BILIRUB SERPL-MCNC: 0.8 MG/DL (ref 0–1)
BUN BLDV-MCNC: 8 MG/DL (ref 7–20)
CALCIUM SERPL-MCNC: 9.8 MG/DL (ref 8.3–10.6)
CHLORIDE BLD-SCNC: 99 MMOL/L (ref 99–110)
CO2: 30 MMOL/L (ref 21–32)
CREAT SERPL-MCNC: 0.6 MG/DL (ref 0.6–1.2)
D DIMER: 275 NG/ML DDU (ref 0–229)
EKG ATRIAL RATE: 70 BPM
EKG DIAGNOSIS: NORMAL
EKG P AXIS: 35 DEGREES
EKG P-R INTERVAL: 152 MS
EKG Q-T INTERVAL: 410 MS
EKG QRS DURATION: 124 MS
EKG QTC CALCULATION (BAZETT): 442 MS
EKG R AXIS: -44 DEGREES
EKG T AXIS: 46 DEGREES
EKG VENTRICULAR RATE: 70 BPM
EOSINOPHILS ABSOLUTE: 0.3 K/UL (ref 0–0.6)
EOSINOPHILS RELATIVE PERCENT: 3.3 %
GFR AFRICAN AMERICAN: >60
GFR NON-AFRICAN AMERICAN: >60
GLUCOSE BLD-MCNC: 134 MG/DL (ref 70–99)
HCT VFR BLD CALC: 46.3 % (ref 36–48)
HEMOGLOBIN: 15.5 G/DL (ref 12–16)
LIPASE: 34 U/L (ref 13–60)
LYMPHOCYTES ABSOLUTE: 2.1 K/UL (ref 1–5.1)
LYMPHOCYTES RELATIVE PERCENT: 24.3 %
MCH RBC QN AUTO: 30.1 PG (ref 26–34)
MCHC RBC AUTO-ENTMCNC: 33.4 G/DL (ref 31–36)
MCV RBC AUTO: 90.2 FL (ref 80–100)
MONOCYTES ABSOLUTE: 0.5 K/UL (ref 0–1.3)
MONOCYTES RELATIVE PERCENT: 5.3 %
NEUTROPHILS ABSOLUTE: 5.5 K/UL (ref 1.7–7.7)
NEUTROPHILS RELATIVE PERCENT: 65.1 %
PDW BLD-RTO: 13.4 % (ref 12.4–15.4)
PLATELET # BLD: 248 K/UL (ref 135–450)
PMV BLD AUTO: 8 FL (ref 5–10.5)
POTASSIUM REFLEX MAGNESIUM: 4 MMOL/L (ref 3.5–5.1)
PRO-BNP: 172 PG/ML (ref 0–124)
RBC # BLD: 5.14 M/UL (ref 4–5.2)
SODIUM BLD-SCNC: 140 MMOL/L (ref 136–145)
TOTAL PROTEIN: 7.6 G/DL (ref 6.4–8.2)
TROPONIN: <0.01 NG/ML
WBC # BLD: 8.5 K/UL (ref 4–11)

## 2021-11-08 PROCEDURE — 71045 X-RAY EXAM CHEST 1 VIEW: CPT

## 2021-11-08 PROCEDURE — 83880 ASSAY OF NATRIURETIC PEPTIDE: CPT

## 2021-11-08 PROCEDURE — 93005 ELECTROCARDIOGRAM TRACING: CPT | Performed by: EMERGENCY MEDICINE

## 2021-11-08 PROCEDURE — 83690 ASSAY OF LIPASE: CPT

## 2021-11-08 PROCEDURE — 93010 ELECTROCARDIOGRAM REPORT: CPT | Performed by: INTERNAL MEDICINE

## 2021-11-08 PROCEDURE — 85025 COMPLETE CBC W/AUTO DIFF WBC: CPT

## 2021-11-08 PROCEDURE — 71260 CT THORAX DX C+: CPT

## 2021-11-08 PROCEDURE — 6360000004 HC RX CONTRAST MEDICATION: Performed by: PHYSICIAN ASSISTANT

## 2021-11-08 PROCEDURE — 99285 EMERGENCY DEPT VISIT HI MDM: CPT

## 2021-11-08 PROCEDURE — 80053 COMPREHEN METABOLIC PANEL: CPT

## 2021-11-08 PROCEDURE — 36415 COLL VENOUS BLD VENIPUNCTURE: CPT

## 2021-11-08 PROCEDURE — 84484 ASSAY OF TROPONIN QUANT: CPT

## 2021-11-08 PROCEDURE — 85379 FIBRIN DEGRADATION QUANT: CPT

## 2021-11-08 PROCEDURE — 6370000000 HC RX 637 (ALT 250 FOR IP): Performed by: PHYSICIAN ASSISTANT

## 2021-11-08 RX ORDER — PREDNISONE 20 MG/1
40 TABLET ORAL ONCE
Status: COMPLETED | OUTPATIENT
Start: 2021-11-08 | End: 2021-11-08

## 2021-11-08 RX ORDER — PREDNISONE 20 MG/1
40 TABLET ORAL DAILY
Qty: 4 TABLET | Refills: 0 | Status: SHIPPED | OUTPATIENT
Start: 2021-11-08 | End: 2021-11-10

## 2021-11-08 RX ORDER — IPRATROPIUM BROMIDE AND ALBUTEROL SULFATE 2.5; .5 MG/3ML; MG/3ML
1 SOLUTION RESPIRATORY (INHALATION) ONCE
Status: COMPLETED | OUTPATIENT
Start: 2021-11-08 | End: 2021-11-08

## 2021-11-08 RX ADMIN — IPRATROPIUM BROMIDE AND ALBUTEROL SULFATE 1 AMPULE: .5; 2.5 SOLUTION RESPIRATORY (INHALATION) at 18:53

## 2021-11-08 RX ADMIN — PREDNISONE 40 MG: 20 TABLET ORAL at 21:00

## 2021-11-08 RX ADMIN — IOPAMIDOL 75 ML: 755 INJECTION, SOLUTION INTRAVENOUS at 19:42

## 2021-11-08 ASSESSMENT — PAIN DESCRIPTION - PROGRESSION: CLINICAL_PROGRESSION: NOT CHANGED

## 2021-11-08 ASSESSMENT — ENCOUNTER SYMPTOMS
SHORTNESS OF BREATH: 1
VOMITING: 1
DIARRHEA: 1
COUGH: 1
NAUSEA: 1
ABDOMINAL PAIN: 0

## 2021-11-08 ASSESSMENT — PAIN SCALES - GENERAL: PAINLEVEL_OUTOF10: 8

## 2021-11-08 ASSESSMENT — PAIN DESCRIPTION - DESCRIPTORS: DESCRIPTORS: SHARP

## 2021-11-08 ASSESSMENT — HEART SCORE: ECG: 0

## 2021-11-08 ASSESSMENT — PAIN DESCRIPTION - FREQUENCY: FREQUENCY: CONTINUOUS

## 2021-11-08 ASSESSMENT — PAIN DESCRIPTION - ORIENTATION: ORIENTATION: RIGHT;LEFT

## 2021-11-08 ASSESSMENT — PAIN DESCRIPTION - PAIN TYPE: TYPE: ACUTE PAIN

## 2021-11-08 ASSESSMENT — PAIN DESCRIPTION - LOCATION: LOCATION: BACK;CHEST

## 2021-11-08 ASSESSMENT — PAIN DESCRIPTION - ONSET: ONSET: ON-GOING

## 2021-11-08 NOTE — ED PROVIDER NOTES
[facility]        Pt Name: Paris Mcdaniel  MRN: [id number]  [place] 1955  Date of evaluation: 11/8/2021  Provider: Sharon Torres PA-C  PCP: Lillian Chambers MD    Shared Visit or Autonomous Visit:  I have seen and evaluated this patient with my supervising physician Vannessa Hobson MD.    [facility]       Chief Complaint   Patient presents with    Chest Pain     c/o chest pain with shortness of breath. Recently traveled on a long car ride, reports symptoms started on the way back. HISTORY OF PRESENT ILLNESS   (Location/Symptom, Timing/Onset, Context/Setting, Quality, Duration, Modifying Factors, Severity)  Note limiting factors. Paris Mcdaniel is a 73 y.o. female presenting to the emergency department for evaluation of chest pain symptoms started Saturday evening, 2 days ago, they were driving back from [employer], feels like someone sitting on her chest pain radiates into her left neck and into her back also feels short of breath. Has chronic COPD states chronic smoker's cough no change. No fever. States she felt sick to her stomach and vomited that evening also started vomiting today with diarrhea and has pain in the upper abdomen. Bilateral lower extremity swelling worse on the left which is typical for her. No history of DVT or PE. Chest pain comes and goes randomly not brought on by exertion or movement sometimes will come on at rest.  Pain coming and going since Saturday. Feels generally weak and fatigued. No fever. No known ill exposures. She got COVID-19 vaccines in March and April this past year. No known heart problems. Had stress test about 5 to 6 years ago states was normal.  Has hypertension, diabetes, hyperlipidemia, smoker, family history of heart disease and obesity. The history is provided by the patient.    Chest Pain  Chest pain location: across anterior chest.  Pain quality comment:  Heaviness  Pain CURRENTMEDICATIONS       Discharge Medication List as of 11/8/2021  8:56 PM      CONTINUE these medications which have NOT CHANGED    Details   !! fluticasone-umeclidin-vilant (TRELEGY ELLIPTA) 100-62.5-25 MCG/INH AEPB Inhale 1 puff into the lungs daily, Disp-1 each, R-0LOT # [id number] EXP 4/23Sample      umeclidinium-vilanterol (ANORO ELLIPTA) 62.5-25 MCG/INH AEPB inhaler Inhale 1 puff into the lungs daily, Disp-1 each, R-0EXP 6/22 LOT [id number]      clonazePAM (KLONOPIN) 0.5 MG tablet TAKE 1 TABLET BY TWICE DAILY AS NEEDED FOR ANXIETY FOR UP TO 30 DAYS, Disp-60 tablet, R-0Normal      atorvastatin (LIPITOR) 20 MG tablet TAKE 1 TABLET NIGHTLY, Disp-30 tablet, R-3Normal      gabapentin (NEURONTIN) 100 MG capsule TAKE 1 CAPSULE DAILY AS NEEDED (FOOT NUMBNESS AND TINGLING) FOR UP TO 30 DAYS., Disp-30 capsule, R-0Normal      metFORMIN (GLUCOPHAGE-XR) 500 MG extended release tablet TAKE 1 TABLET BY MOUTH 2 TIMES DAILY, Disp-180 tablet, R-0Normal      SITagliptin (JANUVIA) 100 MG tablet Take 1 tablet by mouth daily, Disp-28 tablet, R-0Sample      !! fluticasone-umeclidin-vilant (TRELEGY ELLIPTA) 100-62.5-25 MCG/INH AEPB Inhale 1 puff into the lungs daily, Disp-1 each, R-0Sample      furosemide (LASIX) 20 MG tablet TAKE (1) TABLET DAILY, Disp-30 tablet, R-11Normal      !! TRELEGY ELLIPTA 100-62.5-25 MCG/INH AEPB INHALE 1 PUFF ONCE DAILY, Disp-1 each, R-11Normal      albuterol sulfate  (90 Base) MCG/ACT inhaler INHALE 2 PUFFS INTO THE LUNGS EVERY 6 HOURS AS NEEDED FOR WHEEZING, Disp-18 g, R-3Normal      mirtazapine (REMERON) 15 MG tablet TAKE 1 TABLET BY MOUTH NIGHTLY, Disp-90 tablet, R-3Normal      citalopram (CELEXA) 40 MG tablet Take 1 tablet by mouth nightly, Disp-30 tablet, R-5Normal      potassium chloride (KLOR-CON M) 10 MEQ extended release tablet Take 2 tablets by mouth daily, Disp-60 tablet, R-5Normal      Multiple Vitamins-Minerals (THERAPEUTIC MULTIVITAMIN-MINERALS) tablet Take 1 tablet by mouth daily.       !! - Potential duplicate medications found. Please discuss with provider. ALLERGIES     Lisinopril, Trazodone and nefazodone, and Zithromax [azithromycin dihydrate]    FAMILYHISTORY       Family History   Problem Relation Age of Onset    Diabetes Father     Cancer Father         colon    Cancer Sister         anal cancer, kidney cancer    High Blood Pressure Sister     Cancer Brother         stomach (unsure)    Depression Sister     High Blood Pressure Sister           SOCIAL HISTORY       Social History     Socioeconomic History    Marital status:      Spouse name: None    Number of children: 3    Years of education: None    Highest education level: None   Occupational History    None   Tobacco Use    Smoking status: Current Every Day Smoker     Packs/day: 2.00     Years: 45.00     Pack years: 90.00     Types: Cigarettes    Smokeless tobacco: Never Used   Vaping Use    Vaping Use: Never used   Substance and Sexual Activity    Alcohol use: No     Alcohol/week: 0.0 standard drinks     Comment: rare beer maybe 1-2 year     Drug use: No    Sexual activity: Yes     Partners: Male   Other Topics Concern    None   Social History Narrative    11/2014 lives with spouse and grandson;home//raising grandchildren;     Social Determinants of Health     Financial Resource Strain:     Difficulty of Paying Living Expenses: Not on file   Food Insecurity:     Worried About Running Out of Food in the Last Year: Not on file    Piero of Food in the Last Year: Not on file   Transportation Needs:     Lack of Transportation (Medical): Not on file    Lack of Transportation (Non-Medical):  Not on file   Physical Activity:     Days of Exercise per Week: Not on file    Minutes of Exercise per Session: Not on file   Stress:     Feeling of Stress : Not on file   Social Connections:     Frequency of Communication with Friends and Family: Not on file    Frequency of Social Gatherings with Friends and Family: Not on file    Attends Gnosticist Services: Not on file    Active Member of Clubs or Organizations: Not on file    Attends Club or Organization Meetings: Not on file    Marital Status: Not on file   Intimate Partner Violence:     Fear of Current or Ex-Partner: Not on file    Emotionally Abused: Not on file    Physically Abused: Not on file    Sexually Abused: Not on file   Housing Stability:     Unable to Pay for Housing in the Last Year: Not on file    Number of [place] in the Last Year: Not on file    Unstable Housing in the Last Year: Not on file       SCREENINGS    Sibley Coma Scale  Eye Opening: Spontaneous  Best Verbal Response: Oriented  Best Motor Response: Obeys commands  Sibley Coma Scale Score: 15 Heart Score for chest pain patients  History: Slightly Suspicious  ECG: Normal  Patient Age: > 65 years  *Risk factors for Atherosclerotic disease: Cigarette smoking, Diabetes Mellitus, Hypercholesterolemia, Hypertension, Obesity, Positive family History  Risk Factors: > 3 Risk factors or history of atherosclerotic disease*  Troponin: < 1X normal limit  Heart Score Total: 4      PHYSICAL EXAM    (up to 7 for level 4, 8 or more for level 5)     ED Triage Vitals [11/08/21 1807]   BP Temp Temp Source Pulse Resp SpO2 Height Weight   114/77 98.2 °F (36.8 °C) Oral 101 20 95 % 5' 5\" (1.651 m) 248 lb (112.5 kg)       Physical Exam  Vitals and nursing note reviewed. Constitutional:       Appearance: She is obese. She is not toxic-appearing. HENT:      Head: Normocephalic and atraumatic. Mouth/Throat:      Mouth: Mucous membranes are moist.      Pharynx: Oropharynx is clear. No pharyngeal swelling, oropharyngeal exudate or posterior oropharyngeal erythema. Eyes:      Conjunctiva/sclera: Conjunctivae normal.      Pupils: Pupils are equal, round, and reactive to light. Neck:      Vascular: No JVD. Cardiovascular:      Rate and Rhythm: Regular rhythm.  Tachycardia present. Comments: Mild tachycardia   Pulmonary:      Effort: Pulmonary effort is normal. No respiratory distress. Breath sounds: No stridor. Decreased breath sounds present. No wheezing, rhonchi or rales. Abdominal:      General: Bowel sounds are normal. There is no distension. Palpations: Abdomen is soft. Abdomen is not rigid. There is no mass. Tenderness: There is no abdominal tenderness. There is no guarding or rebound. Musculoskeletal:         General: Normal range of motion. Cervical back: Normal range of motion and neck supple. Right lower leg: Edema present. Left lower leg: Edema present. Comments: No calf tenderness   Skin:     General: Skin is warm and dry. Findings: No rash. Neurological:      Mental Status: She is alert and oriented to person, place, and time. GCS: GCS eye subscore is 4. GCS verbal subscore is 5. GCS motor subscore is 6. Cranial Nerves: No cranial nerve deficit. Sensory: No sensory deficit. Motor: No abnormal muscle tone. Coordination: Coordination normal.   Psychiatric:         Behavior: Behavior normal.         DIAGNOSTIC RESULTS   LABS:    Labs Reviewed   COMPREHENSIVE METABOLIC PANEL W/ REFLEX TO MG FOR LOW K - Abnormal; Notable for the following components:       Result Value    Glucose 134 (*)     Alkaline Phosphatase 142 (*)     All other components within normal limits    Narrative:     Performed at:  [facility]. [facility]  [address] [place] [address]. [address]   Phone [phone] - Abnormal; Notable for the following components:    Pro- (*)     All other components within normal limits    Narrative:     Performed at:  [facility]. [facility]  [address] [place] [address].  [address]   Phone [phone]   D-DIMER, QUANTITATIVE - Abnormal; Notable for the following components:    D-Dimer, Quant 275 (*)     All other components within normal limits    Narrative:     Performed at:  [facility]. [facility]  [facility] [employer]   Phone [phone]   CBC WITH AUTO DIFFERENTIAL    Narrative:     Performed at:  [facility]. [facility]  [facility] [address] [employer]   Phone [phone]   TROPONIN    Narrative:     Performed at:  [facility]. [facility]  [facility] [employer] [address] [employer]   Phone [phone]   LIPASE    Narrative:     Performed at:  [facility]. [facility]  [facility].  [employer]   Phone [phone]     Results for orders placed or performed during the hospital encounter of 11/08/21   CBC Auto Differential   Result Value Ref Range    WBC 8.5 4.0 - 11.0 K/uL    RBC 5.14 4.00 - 5.20 M/uL    Hemoglobin 15.5 12.0 - 16.0 g/dL    Hematocrit 46.3 36.0 - 48.0 %    MCV 90.2 80.0 - 100.0 fL    MCH 30.1 26.0 - 34.0 pg    MCHC 33.4 31.0 - 36.0 g/dL    RDW 13.4 12.4 - 15.4 %    Platelets 758 217 - 916 K/uL    MPV 8.0 5.0 - 10.5 fL    Neutrophils % 65.1 %    Lymphocytes % 24.3 %    Monocytes % 5.3 %    Eosinophils % 3.3 %    Basophils % 2.0 %    Neutrophils Absolute 5.5 1.7 - 7.7 K/uL    Lymphocytes Absolute 2.1 1.0 - 5.1 K/uL    Monocytes Absolute 0.5 0.0 - 1.3 K/uL    Eosinophils Absolute 0.3 0.0 - 0.6 K/uL    Basophils Absolute 0.2 0.0 - 0.2 K/uL   Comprehensive Metabolic Panel w/ Reflex to MG   Result Value Ref Range    Sodium 140 136 - 145 mmol/L    Potassium reflex Magnesium 4.0 3.5 - 5.1 mmol/L    Chloride 99 99 - 110 mmol/L    CO2 30 21 - 32 mmol/L    Anion Gap 11 3 - 16    Glucose 134 (H) 70 - 99 mg/dL    BUN 8 7 - 20 mg/dL    CREATININE 0.6 0.6 - 1.2 mg/dL    GFR Non-African American >60 >60    GFR African American >60 >60    Calcium 9.8 8.3 - 10.6 mg/dL    Total Protein 7.6 6.4 - 8.2 g/dL    Albumin TECHNOLOGIST PROVIDED HISTORY: Reason for exam:->chest pain Reason for Exam: cp, sob Acuity: Acute Type of Exam: Initial FINDINGS: Cardiomediastinal silhouette stable. Left parahilar opacity. No pulmonary vascular congestion or edema. No pneumothorax. Left parahilar opacity could represent subsegmental atelectasis or infection           PROCEDURES   Unless otherwise noted below, none     Procedures    CRITICAL CARE TIME   N/A    CONSULTS:  None      EMERGENCY DEPARTMENT COURSE and DIFFERENTIAL DIAGNOSIS/MDM:   Vitals:    Vitals:    11/08/21 1807 11/08/21 1853 11/08/21 2055   BP: 114/77 129/82 116/87   Pulse: 101 66 83   Resp: 20 14 18   Temp: 98.2 °F (36.8 °C)     TempSrc: Oral     SpO2: 95% 100% 97%   Weight: 248 lb (112.5 kg)     Height: 5' 5\" (1.651 m)         Patient was given thefollowing medications:  Medications   ipratropium-albuterol (DUONEB) nebulizer solution 1 ampule (1 ampule Inhalation Given 11/8/21 1853)   iopamidol (ISOVUE-370) 76 % injection 75 mL (75 mLs IntraVENous Given 11/8/21 1942)   predniSONE (DELTASONE) tablet 40 mg (40 mg Oral Given 11/8/21 2100)         ED course  Patient presented to the ER for evaluation of chest pain and shortness of breath. Symptoms started when returning home from [place] on Saturday evening she got sick in the car vomited has had vomiting diarrhea body aches states thinks driving through the fields where they had just plowed may have caused respiratory problem. She has COPD. Smoker. Sees pulmonology. Uses inhalers at home. She has multiple cardiac risk factors thus work-up was obtained but her chest pain is not exertional.  Decreased breath sounds on exam was given breathing treatment here. Work-up obtained. EKG no acute ischemia no significant change from previous. Troponin is normal.  White count 8.5. Hemoglobin 15.5. Sodium 140. Potassium 4.0.  Glucose 130. Normal renal function. Alk phos 142 otherwise LFTs unremarkable. .   D-dimer mildly elevated 275 CTPA was performed negative for pulmonary embolism and no pneumonia. 8:54 PM EST  Patient reevaluated states she feels much better after breathing treatment given here. Breath sounds have improved she now has good air exchange. States her chest pain/heaviness is now gone after breathing treatment. Suspect her chest pain was respiratory related. Suspect COPD exacerbation. At this time I think it is safe for discharge home cardiac work-up is unremarkable. She will be placed on short course of prednisone. We will continue her inhalers. Advise close follow-up with her doctor and to return to the ER for any worsening or changes symptoms. She understands and agrees. I estimate there is LOW risk for PULMONARY EMBOLISM, PULMONARY EDEMA, PNEUMONIA, PNEUMOTHORAX, STATUS ASTHMATICUS, ACUTE RESPIRATORY FAILURE, OR ACUTE CORONARY SYNDROME, thus I consider the discharge disposition reasonable. I estimate there is LOW risk for PULMONARY EMBOLISM, ACUTE CORONARY SYNDROME, OR THORACIC AORTIC DISSECTION, thus I consider the discharge disposition reasonable. FINAL IMPRESSION      1. Chest pain, unspecified type    2. COPD exacerbation [facility])          DISPOSITION/PLAN   DISPOSITION     PATIENT REFERREDTO:  Lillian Chambers MD  [address]  [phone]    Call in 1 day  Call to arrange follow-up appointment from ER visit    [place].  [facility]  [address] [place] [address]  [phone]    If symptoms worsen      DISCHARGE MEDICATIONS:  Discharge Medication List as of 11/8/2021  8:56 PM      START taking these medications    Details   predniSONE (DELTASONE) 20 MG tablet Take 2 tablets by mouth daily for 2 days, Disp-4 tablet, R-0Normal             DISCONTINUED MEDICATIONS:  Discharge Medication List as of 11/8/2021  8:56 PM                 (Please note that portions ofthis note were completed with a voice recognition program.  Efforts were made to edit the dictations but occasionally words are mis-transcribed.)    Ken Saucedo PA-C (electronically signed)            Sue Jones PA-C  11/08/21 0576

## 2021-11-08 NOTE — TELEPHONE ENCOUNTER
Pt called wanting an appt with you for \"feeling like something is sitting on her chest\" and occasional CP. She also states that her left side of her neck was hurting yesterday. L shoulder hurts all the time due to h/o surgeries. This has been going on for approx 2 days. She said that does have h/o COPD. She wanted to see you but I have nothing available until Weds. I advised her to go to the ER or urgent care to be checked out. She agreed.    KIRA

## 2021-11-09 NOTE — ED PROVIDER NOTES
[facility]    IZABELLA Attestation Note:    CHIEF COMPLAINT  Chest Pain (c/o chest pain with shortness of breath. Recently traveled on a long car ride, reports symptoms started on the way back.)       HISTORY OF PRESENT ILLNESS  This patient was seen in conjunction with the IZABELLA. I agree with their History, Physical Exam and Plan for this patient. Chito Foley is a 73 y.o. female  who was seen and evaluated in Room 6 presents to the ED complaining of shortness of breath and cough. The work up included chest x-ray and labs      My PHYSICAL EXAM Finding included: Clear breath sounds    The course of this patient's hospitalization was discussed with the IZABELLA directly. ED Course as of 11/08/21 1952 Mon Nov 08, 2021 1911 EKG: Indication: Chest pain, it shows a rhythm of normal sinus rhythm at a rate of 70, with no acute ST-Twave changes to indicate ischemia. Intervals are normal and the axis is leftward with a right bundle branch block. This  interpreted by me without a cardiologist.  [DL]      ED Course User Index  [DL] Sebastián Cronin MD       DISCLAIMER: This chart was created using Dragon dictation software. Efforts were made by me to ensure accuracy, however some errors may be present due to limitations of this technology and occasionally words are not transcribed correctly.         Sebastián Cronin MD  11/08/21 0292

## 2021-11-12 DIAGNOSIS — F32.A DEPRESSION, UNSPECIFIED DEPRESSION TYPE: ICD-10-CM

## 2021-11-12 DIAGNOSIS — R06.2 WHEEZING: ICD-10-CM

## 2021-11-12 DIAGNOSIS — R06.02 SHORTNESS OF BREATH: ICD-10-CM

## 2021-11-12 RX ORDER — CITALOPRAM 40 MG/1
40 TABLET ORAL NIGHTLY
Qty: 90 TABLET | Refills: 3 | Status: SHIPPED | OUTPATIENT
Start: 2021-11-12

## 2021-11-12 RX ORDER — ALBUTEROL SULFATE 90 UG/1
2 AEROSOL, METERED RESPIRATORY (INHALATION) EVERY 6 HOURS PRN
Qty: 18 G | Refills: 3 | Status: SHIPPED | OUTPATIENT
Start: 2021-11-12 | End: 2022-05-05 | Stop reason: SDUPTHER

## 2021-11-17 ENCOUNTER — OFFICE VISIT (OUTPATIENT)
Dept: FAMILY MEDICINE CLINIC | Age: 66
End: 2021-11-17
Payer: MEDICARE

## 2021-11-17 VITALS
WEIGHT: 249.2 LBS | HEIGHT: 65 IN | SYSTOLIC BLOOD PRESSURE: 126 MMHG | DIASTOLIC BLOOD PRESSURE: 78 MMHG | BODY MASS INDEX: 41.52 KG/M2 | OXYGEN SATURATION: 96 % | HEART RATE: 86 BPM

## 2021-11-17 DIAGNOSIS — R07.9 CHEST PAIN, UNSPECIFIED TYPE: ICD-10-CM

## 2021-11-17 DIAGNOSIS — E11.9 TYPE 2 DIABETES MELLITUS WITHOUT COMPLICATION, WITHOUT LONG-TERM CURRENT USE OF INSULIN (HCC): ICD-10-CM

## 2021-11-17 DIAGNOSIS — J44.9 CHRONIC OBSTRUCTIVE PULMONARY DISEASE, UNSPECIFIED COPD TYPE (HCC): Primary | ICD-10-CM

## 2021-11-17 PROCEDURE — 99214 OFFICE O/P EST MOD 30 MIN: CPT | Performed by: FAMILY MEDICINE

## 2021-11-17 PROCEDURE — 3052F HG A1C>EQUAL 8.0%<EQUAL 9.0%: CPT | Performed by: FAMILY MEDICINE

## 2021-11-17 RX ORDER — FLUTICASONE FUROATE, UMECLIDINIUM BROMIDE AND VILANTEROL TRIFENATATE 100; 62.5; 25 UG/1; UG/1; UG/1
1 POWDER RESPIRATORY (INHALATION) DAILY
Qty: 2 EACH | Refills: 0 | COMMUNITY
Start: 2021-11-17 | End: 2022-01-05 | Stop reason: DRUGHIGH

## 2021-11-17 ASSESSMENT — ENCOUNTER SYMPTOMS: SHORTNESS OF BREATH: 1

## 2021-11-17 NOTE — PATIENT INSTRUCTIONS
Patient Education        Stopping Smoking: Care Instructions  Your Care Instructions     Cigarette smokers crave the nicotine in cigarettes. Giving it up is much harder than simply changing a habit. Your body has to stop craving the nicotine. It is hard to quit, but you can do it. There are many tools that people use to quit smoking. You may find that combining tools works best for you. There are several steps to quitting. First you get ready to quit. Then you get support to help you. After that, you learn new skills and behaviors to become a nonsmoker. For many people, a necessary step is getting and using medicine. Your doctor will help you set up the plan that best meets your needs. You may want to attend a smoking cessation program to help you quit smoking. When you choose a program, look for one that has proven success. Ask your doctor for ideas. You will greatly increase your chances of success if you take medicine as well as get counseling or join a cessation program.  Some of the changes you feel when you first quit tobacco are uncomfortable. Your body will miss the nicotine at first, and you may feel short-tempered and grumpy. You may have trouble sleeping or concentrating. Medicine can help you deal with these symptoms. You may struggle with changing your smoking habits and rituals. The last step is the tricky one: Be prepared for the smoking urge to continue for a time. This is a lot to deal with, but keep at it. You will feel better. Follow-up care is a key part of your treatment and safety. Be sure to make and go to all appointments, and call your doctor if you are having problems. It's also a good idea to know your test results and keep a list of the medicines you take. How can you care for yourself at home? · Ask your family, friends, and coworkers for support. You have a better chance of quitting if you have help and support.   · Join a support group, such as Nicotine Anonymous, for people who are trying to quit smoking. · Consider signing up for a smoking cessation program, such as the American Lung Association's Freedom from Smoking program.  · Get text messaging support. Go to the website at www.smokefree. gov to sign up for the [facility] program.  · Set a quit date. Pick your date carefully so that it is not right in the middle of a big deadline or stressful time. Once you quit, do not even take a puff. Get rid of all ashtrays and lighters after your last cigarette. Clean your house and your clothes so that they do not smell of smoke. · Learn how to be a nonsmoker. Think about ways you can avoid those things that make you reach for a cigarette. ? Avoid situations that put you at greatest risk for smoking. For some people, it is hard to have a drink with friends without smoking. For others, they might skip a coffee break with coworkers who smoke. ? Change your daily routine. Take a different route to work or eat a meal in a different place. · Cut down on stress. Calm yourself or release tension by doing an activity you enjoy, such as reading a book, taking a hot bath, or gardening. · Talk to your doctor or pharmacist about nicotine replacement therapy, which replaces the nicotine in your body. You still get nicotine but you do not use tobacco. Nicotine replacement products help you slowly reduce the amount of nicotine you need. These products come in several forms, many of them available over-the-counter:  ? Nicotine patches  ? Nicotine gum and lozenges  ? Nicotine inhaler  · Ask your doctor about bupropion (Wellbutrin) or varenicline (Chantix), which are prescription medicines. They do not contain nicotine. They help you by reducing withdrawal symptoms, such as stress and anxiety. · Some people find hypnosis, acupuncture, and massage helpful for ending the smoking habit. · Eat a healthy diet and get regular exercise.  Having healthy habits will help your body move past its craving for nicotine. · Be prepared to keep trying. Most people are not successful the first few times they try to quit. Do not get mad at yourself if you smoke again. Make a list of things you learned and think about when you want to try again, such as next week, next month, or next year. Where can you learn more? Go to https://[employer] Solutionspejúniorewtheresa.[employer]. org and sign in to your [employer] account. Enter Y326 in the giftee box to learn more about \"Stopping Smoking: Care Instructions. \"     If you do not have an account, please click on the \"Sign Up Now\" link. Current as of: February 11, 2021               Content Version: 13.0  © 2006-2021 Healthwise, Incorporated. Care instructions adapted under license by [facility] ([facility]). If you have questions about a medical condition or this instruction, always ask your healthcare professional. [address] any warranty or liability for your use of this information.

## 2021-11-17 NOTE — PROGRESS NOTES
History    Marital status:      Spouse name: Not on file    Number of children: 3    Years of education: Not on file    Highest education level: Not on file   Occupational History    Not on file   Tobacco Use    Smoking status: Current Every Day Smoker     Packs/day: 2.00     Years: 45.00     Pack years: 90.00     Types: Cigarettes    Smokeless tobacco: Never Used   Vaping Use    Vaping Use: Never used   Substance and Sexual Activity    Alcohol use: No     Alcohol/week: 0.0 standard drinks     Comment: rare beer maybe 1-2 year     Drug use: No    Sexual activity: Yes     Partners: Male   Other Topics Concern    Not on file   Social History Narrative    11/2014 lives with spouse and grandson;home//raising grandchildren;     Social Determinants of Health     Financial Resource Strain:     Difficulty of Paying Living Expenses: Not on file   Food Insecurity:     Worried About Running Out of Food in the Last Year: Not on file    Piero of Food in the Last Year: Not on file   Transportation Needs:     Lack of Transportation (Medical): Not on file    Lack of Transportation (Non-Medical):  Not on file   Physical Activity:     Days of Exercise per Week: Not on file    Minutes of Exercise per Session: Not on file   Stress:     Feeling of Stress : Not on file   Social Connections:     Frequency of Communication with Friends and Family: Not on file    Frequency of Social Gatherings with Friends and Family: Not on file    Attends Rastafari Services: Not on file    Active Member of Clubs or Organizations: Not on file    Attends Club or Organization Meetings: Not on file    Marital Status: Not on file   Intimate Partner Violence:     Fear of Current or Ex-Partner: Not on file    Emotionally Abused: Not on file    Physically Abused: Not on file    Sexually Abused: Not on file   Housing Stability:     Unable to Pay for Housing in the Last Year: Not on file    Number of [place] in the Last Year: Not on file    Unstable Housing in the Last Year: Not on file       Prior to Visit Medications    Medication Sig Taking? Authorizing Provider   fluticasone-umeclidin-vilant (TRELEGY ELLIPTA) 100-62.5-25 MCG/INH AEPB Inhale 1 puff into the lungs daily Yes Sagrario Hernadez MD   linagliptin (TRADJENTA) 5 MG tablet Take 1 tablet by mouth daily Yes Sagrario Hernadez MD   clonazePAM (KLONOPIN) 0.5 MG tablet TAKE 1 TABLET TWICE DAILY AS NEEDED FOR ANXIETY FOR UP TO 30 DAYS Yes Sagrario Hernadez MD   gabapentin (NEURONTIN) 100 MG capsule TAKE 1 CAPSULE DAILY AS NEEDED (FOOT NUMBNESS AND TINGLING) FOR UP TO 30 DAYS. Yes Sagrario Hernadez MD   metFORMIN (GLUCOPHAGE-XR) 500 MG extended release tablet TAKE 1 TABLET BY MOUTH 2 TIMES DAILY Yes Sargario Hernadez MD   citalopram (CELEXA) 40 MG tablet TAKE 1 TABLET BY MOUTH NIGHTLY Yes Sagrario Hernadez MD   albuterol sulfate  (90 Base) MCG/ACT inhaler INHALE 2 PUFFS INTO THE LUNGS EVERY 6 HOURS AS NEEDED FOR WHEEZING Yes Sagrario Hernadez MD   fluticasone-umeclidin-vilant (TRELEGY ELLIPTA) 100-62.5-25 MCG/INH AEPB Inhale 1 puff into the lungs daily Yes Karin Reveles MD   atorvastatin (LIPITOR) 20 MG tablet TAKE 1 TABLET NIGHTLY Yes Sagrario Hernadez MD   furosemide (LASIX) 20 MG tablet TAKE (1) TABLET DAILY Yes FARHAT Nance CNP   mirtazapine (REMERON) 15 MG tablet TAKE 1 TABLET BY MOUTH NIGHTLY Yes FARHAT Nance CNP   potassium chloride (KLOR-CON M) 10 MEQ extended release tablet Take 2 tablets by mouth daily Yes FARHAT Nance CNP   Multiple Vitamins-Minerals (THERAPEUTIC MULTIVITAMIN-MINERALS) tablet Take 1 tablet by mouth daily.  Yes Historical Provider, MD   SITagliptin (JANUVIA) 100 MG tablet Take 1 tablet by mouth daily  Patient not taking: Reported on 11/17/2021  Sagrario Hernadez MD       Allergies   Allergen Reactions    Lisinopril      Angioedema 1/27/2017    Trazodone And Nefazodone Other (See Comments)     nightmares    Zithromax [Azithromycin Dihydrate] Nausea And Vomiting       OBJECTIVE:    /78   Pulse 86   Ht 5' 5\" (1.651 m)   Wt 249 lb 3.2 oz (113 kg)   SpO2 96%   BMI 41.47 kg/m²     BP Readings from Last 2 Encounters:   11/17/21 126/78   11/08/21 116/87       Wt Readings from Last 3 Encounters:   11/17/21 249 lb 3.2 oz (113 kg)   11/08/21 248 lb (112.5 kg)   10/27/21 248 lb 6.4 oz (112.7 kg)       Physical Exam  Constitutional:       Appearance: Normal appearance. HENT:      Head: Normocephalic and atraumatic. Eyes:      Extraocular Movements: Extraocular movements intact. Cardiovascular:      Rate and Rhythm: Normal rate and regular rhythm. Pulmonary:      Effort: Pulmonary effort is normal.      Breath sounds: Decreased breath sounds present. Neurological:      General: No focal deficit present. Mental Status: She is alert and oriented to person, place, and time. Psychiatric:         Mood and Affect: Mood normal.         Behavior: Behavior normal.           ASSESSMENT/PLAN:    1. Chronic obstructive pulmonary disease, unspecified COPD type ([address]  Suspected etiology recent chest pain. Reviewed pulmonology note. Patient does well with Trelegy. Additional samples given today. Pulmonary function tests and pulmonology follow-up as planned. - fluticasone-umeclidin-vilant (Maria Luz Prashant) 100-62.5-25 MCG/INH AEPB; Inhale 1 puff into the lungs daily  Dispense: 2 each; Refill: 0    2. Chest pain, unspecified type  Reviewed recent ER note. She did have an elevation in her D-dimer which led to CT of the chest.  Negative for pulmonary embolism. Thought to be more related to COPD exacerbation. Monitor for now. Symptoms have improved    3. Type 2 diabetes mellitus without complication, without long-term current use of insulin ([facility])  Questionable side effects with Januvia previously. Trial of Tradjenta as below. Prior hemoglobin A1c elevated.   Follow-up in 6 to 8 weeks with plan to repeat labs around that time  - linagliptin (TRADJENTA) 5 MG tablet; Take 1 tablet by mouth daily  Dispense: 35 tablet; Refill: 0  This document was prepared by a combination of typing and transcription through a voice recognition software.

## 2021-12-22 NOTE — OP NOTE
OPERATIVE NOTE      Surgery Date:   05/01/2019       Pre-operative Diagnosis:   Narrow Angle Glaucoma    Procedure:   YAG Peripheral Iridotomy left eye    Anesthesia:  Topical    Complications:  None    The patient tolerated the procedure well and will follow up as an outpatient in my office as scheduled. Jose L Jarquin M.D.
Oriented - self; Oriented - place; Oriented - time

## 2021-12-23 ENCOUNTER — HOSPITAL ENCOUNTER (OUTPATIENT)
Dept: PULMONOLOGY | Age: 66
Discharge: HOME | End: 2021-12-23
Payer: MEDICARE

## 2021-12-23 VITALS — OXYGEN SATURATION: 95 %

## 2021-12-23 DIAGNOSIS — J44.9 CHRONIC OBSTRUCTIVE PULMONARY DISEASE, UNSPECIFIED COPD TYPE (HCC): ICD-10-CM

## 2021-12-23 LAB
DLCO %PRED: 54 %
DLCO PRED: NORMAL
DLCO/VA %PRED: NORMAL
DLCO/VA PRED: NORMAL
DLCO/VA: NORMAL
DLCO: NORMAL
EXPIRATORY TIME-POST: NORMAL
EXPIRATORY TIME: NORMAL
FEF 25-75% %CHNG: NORMAL
FEF 25-75% %PRED-POST: NORMAL
FEF 25-75% %PRED-PRE: NORMAL
FEF 25-75% PRED: NORMAL
FEF 25-75%-POST: NORMAL
FEF 25-75%-PRE: NORMAL
FEV1 %PRED-POST: 65 %
FEV1 %PRED-PRE: 67 %
FEV1 PRED: NORMAL
FEV1-POST: NORMAL
FEV1-PRE: NORMAL
FEV1/FVC %PRED-POST: NORMAL
FEV1/FVC %PRED-PRE: NORMAL
FEV1/FVC PRED: NORMAL
FEV1/FVC-POST: 70 %
FEV1/FVC-PRE: 70 %
FVC %PRED-POST: NORMAL
FVC %PRED-PRE: NORMAL
FVC PRED: NORMAL
FVC-POST: NORMAL
FVC-PRE: NORMAL
GAW %PRED: NORMAL
GAW PRED: NORMAL
GAW: NORMAL
IC %PRED: NORMAL
IC PRED: NORMAL
IC: NORMAL
MEP: NORMAL
MIP: NORMAL
MVV %PRED-PRE: NORMAL
MVV PRED: NORMAL
MVV-PRE: NORMAL
PEF %PRED-POST: NORMAL
PEF %PRED-PRE: NORMAL
PEF PRED: NORMAL
PEF%CHNG: NORMAL
PEF-POST: NORMAL
PEF-PRE: NORMAL
RAW %PRED: NORMAL
RAW PRED: NORMAL
RAW: NORMAL
RV %PRED: NORMAL
RV PRED: NORMAL
RV: NORMAL
SVC %PRED: NORMAL
SVC PRED: NORMAL
SVC: NORMAL
TLC %PRED: 86 %
TLC PRED: NORMAL
TLC: NORMAL
VA %PRED: NORMAL
VA PRED: NORMAL
VA: NORMAL
VTG %PRED: NORMAL
VTG PRED: NORMAL
VTG: NORMAL

## 2021-12-23 PROCEDURE — 94760 N-INVAS EAR/PLS OXIMETRY 1: CPT

## 2021-12-23 PROCEDURE — 94729 DIFFUSING CAPACITY: CPT

## 2021-12-23 PROCEDURE — 94640 AIRWAY INHALATION TREATMENT: CPT

## 2021-12-23 PROCEDURE — 94060 EVALUATION OF WHEEZING: CPT

## 2021-12-23 PROCEDURE — 6370000000 HC RX 637 (ALT 250 FOR IP): Performed by: INTERNAL MEDICINE

## 2021-12-23 PROCEDURE — 94726 PLETHYSMOGRAPHY LUNG VOLUMES: CPT

## 2021-12-23 RX ORDER — ALBUTEROL SULFATE 90 UG/1
4 AEROSOL, METERED RESPIRATORY (INHALATION) ONCE
Status: COMPLETED | OUTPATIENT
Start: 2021-12-23 | End: 2021-12-23

## 2021-12-23 RX ADMIN — Medication 4 PUFF: at 10:15

## 2021-12-23 ASSESSMENT — PULMONARY FUNCTION TESTS
FEV1/FVC_POST: 70
FEV1_PERCENT_PREDICTED_POST: 65
FEV1/FVC_PRE: 70
FEV1_PERCENT_PREDICTED_PRE: 67

## 2021-12-24 NOTE — PROCEDURES
[address]                 [address] [address]                               PULMONARY FUNCTION    PATIENT NAME: Everett Stephens                      :        1955  MED REC NO:   [id number]                          ROOM:  ACCOUNT NO:   [de-identified]                           ADMIT DATE: 2021  PROVIDER:     Mary Claudio MD    DATE OF PROCEDURE:  2021    ATTENDING PROVIDER:  Matthew Rao MD    INDICATION:  COPD. FINDINGS:  1. Spirometry: The FVC is 73% of predicted. The FEV1 is 1.68 liters,  which is 67% of predicted. The FEV1/FVC ratio is 0.70. There is no  response to bronchodilators. 2.  Plethysmography:  The total lung capacity is 86% of predicted. 3.  The diffusion capacity of carbon monoxide is 54% of predicted. 4.  The flow volume loop shows an obstructive pattern. IMPRESSION:  This patient has moderate COPD with decreased DLCO.         Gokul Simon MD    D: 2021 14:15:59       T: 2021 21:52:35     [email or web address]  Job#: [id number]     Doc#: [id number]    CC:

## 2021-12-27 ENCOUNTER — OFFICE VISIT (OUTPATIENT)
Dept: PULMONOLOGY | Age: 66
End: 2021-12-27
Payer: MEDICARE

## 2021-12-27 VITALS
SYSTOLIC BLOOD PRESSURE: 130 MMHG | BODY MASS INDEX: 40.82 KG/M2 | OXYGEN SATURATION: 94 % | HEART RATE: 78 BPM | DIASTOLIC BLOOD PRESSURE: 84 MMHG | HEIGHT: 65 IN | RESPIRATION RATE: 18 BRPM | WEIGHT: 245 LBS

## 2021-12-27 DIAGNOSIS — J44.9 CHRONIC OBSTRUCTIVE PULMONARY DISEASE, UNSPECIFIED COPD TYPE (HCC): Primary | ICD-10-CM

## 2021-12-27 PROCEDURE — 99214 OFFICE O/P EST MOD 30 MIN: CPT | Performed by: INTERNAL MEDICINE

## 2021-12-27 RX ORDER — FLUTICASONE FUROATE, UMECLIDINIUM BROMIDE AND VILANTEROL TRIFENATATE 200; 62.5; 25 UG/1; UG/1; UG/1
1 POWDER RESPIRATORY (INHALATION) DAILY
Qty: 1 EACH | Refills: 0 | COMMUNITY
Start: 2021-12-27 | End: 2022-02-08 | Stop reason: SDUPTHER

## 2021-12-27 NOTE — PROGRESS NOTES
PULMONARY    CC: Shortness of breath & wheezing    Presenting HPI: Rosemarie Francois is a 72 yo with a past medical history liver cirrhosis and emphysema who presents today with a history of increasing severe shortness of breath worse with exertion, associated with chest tightness, treated with Trelegy with improvement. reports that she has been smoking cigarettes. She has a 90.00 pack-year smoking history. She has never used smokeless tobacco.      PHYSICAL EXAM:  Blood pressure 130/84, pulse 78, resp. rate 18, height 5' 5\" (1.651 m), weight 245 lb (111.1 kg), SpO2 94 %, not currently breastfeeding.'  Constitutional:  No acute distress. HENT:  Oropharynx is clear and moist.   Neck: No tracheal deviation present. Cardiovascular: Normal heart sounds. No lower extremity edema. Pulmonary/Chest: No wheezes. No rhonchi. No rales. No decreased breath sounds. No accessory muscle usage or stridor. Musculoskeletal: No cyanosis. No clubbing. Skin: Skin is warm and dry. Psychiatric: Normal mood and affect. Neurologic: speech fluent, alert and oriented, strength symmetric      DATA:  CTPA 11/8/21  Pulmonary Arteries: Pulmonary arteries are adequately opacified for   evaluation.  No evidence of intraluminal filling defect to suggest pulmonary   embolism.  Main pulmonary artery is normal in caliber.       Mediastinum: No evidence of mediastinal lymphadenopathy.  The heart and   pericardium demonstrate no acute abnormality.  There is no acute abnormality   of the thoracic aorta.       Lungs/pleura: The lungs are without acute process.  No focal consolidation or   pulmonary edema.  No evidence of pleural effusion or pneumothorax.       Upper Abdomen: Limited images of the upper abdomen are unremarkable.       Soft Tissues/Bones: No acute bone or soft tissue abnormality.        PFT 12/23/21 FEV1 1.68 L 67% TLC 4.59 L 86%  DLCO 12.69 54%     ASSESSMENT:  · COPD/pulmonary emphysema  · Pulmonary nodules, 2 to 3 mm on LDCT  · Shortness of breath   · Greater than 100-pack-year tobacco history, has cut down from 3 pack down to 2 ppd    PLAN:  · Trelegy - give sample and patient assistance information again today   · Low dose chest CT for lung cancer screening in Feb 2022 should be cancelled. Next LDCT would be due in November 2022. Will schedule at next visit. · Tobacco cessation is imperative. She declined Wellbutrin due to multiple psychiatric medications. Chantix was not covered by insurance.   · See me in about 6 months

## 2022-01-05 ENCOUNTER — OFFICE VISIT (OUTPATIENT)
Dept: FAMILY MEDICINE CLINIC | Age: 67
End: 2022-01-05
Payer: MEDICARE

## 2022-01-05 VITALS
BODY MASS INDEX: 40.79 KG/M2 | WEIGHT: 244.8 LBS | SYSTOLIC BLOOD PRESSURE: 132 MMHG | HEART RATE: 84 BPM | OXYGEN SATURATION: 96 % | DIASTOLIC BLOOD PRESSURE: 80 MMHG | HEIGHT: 65 IN

## 2022-01-05 DIAGNOSIS — I10 PRIMARY HYPERTENSION: Primary | ICD-10-CM

## 2022-01-05 DIAGNOSIS — M51.36 DDD (DEGENERATIVE DISC DISEASE), LUMBAR: ICD-10-CM

## 2022-01-05 DIAGNOSIS — F32.89 OTHER DEPRESSION: ICD-10-CM

## 2022-01-05 DIAGNOSIS — E11.9 TYPE 2 DIABETES MELLITUS WITHOUT COMPLICATION, WITHOUT LONG-TERM CURRENT USE OF INSULIN (HCC): ICD-10-CM

## 2022-01-05 DIAGNOSIS — R20.8 BURNING SENSATION OF FEET: ICD-10-CM

## 2022-01-05 DIAGNOSIS — Z23 NEED FOR VACCINATION FOR PNEUMOCOCCUS: ICD-10-CM

## 2022-01-05 DIAGNOSIS — K74.60 CIRRHOSIS OF LIVER WITHOUT ASCITES, UNSPECIFIED HEPATIC CIRRHOSIS TYPE (HCC): ICD-10-CM

## 2022-01-05 DIAGNOSIS — Z23 NEED FOR IMMUNIZATION AGAINST INFLUENZA: ICD-10-CM

## 2022-01-05 LAB — HBA1C MFR BLD: 6.8 %

## 2022-01-05 PROCEDURE — 90732 PPSV23 VACC 2 YRS+ SUBQ/IM: CPT | Performed by: FAMILY MEDICINE

## 2022-01-05 PROCEDURE — G0008 ADMIN INFLUENZA VIRUS VAC: HCPCS | Performed by: FAMILY MEDICINE

## 2022-01-05 PROCEDURE — G0009 ADMIN PNEUMOCOCCAL VACCINE: HCPCS | Performed by: FAMILY MEDICINE

## 2022-01-05 PROCEDURE — 99214 OFFICE O/P EST MOD 30 MIN: CPT | Performed by: FAMILY MEDICINE

## 2022-01-05 PROCEDURE — 90694 VACC AIIV4 NO PRSRV 0.5ML IM: CPT | Performed by: FAMILY MEDICINE

## 2022-01-05 PROCEDURE — 83036 HEMOGLOBIN GLYCOSYLATED A1C: CPT | Performed by: FAMILY MEDICINE

## 2022-01-05 RX ORDER — SITAGLIPTIN AND METFORMIN HYDROCHLORIDE 100; 1000 MG/1; MG/1
1 TABLET, FILM COATED, EXTENDED RELEASE ORAL DAILY
Qty: 90 TABLET | Refills: 1 | Status: SHIPPED | OUTPATIENT
Start: 2022-01-05 | End: 2022-03-15

## 2022-01-05 RX ORDER — GABAPENTIN 100 MG/1
100 CAPSULE ORAL 3 TIMES DAILY
Qty: 90 CAPSULE | Refills: 2 | Status: SHIPPED | OUTPATIENT
Start: 2022-01-05 | End: 2022-04-18

## 2022-01-05 RX ORDER — TIZANIDINE 4 MG/1
4 TABLET ORAL EVERY 8 HOURS PRN
Qty: 30 TABLET | Refills: 0 | Status: SHIPPED | OUTPATIENT
Start: 2022-01-05 | End: 2022-02-08 | Stop reason: SDUPTHER

## 2022-01-05 RX ORDER — METHYLPREDNISOLONE 4 MG/1
TABLET ORAL
Qty: 1 KIT | Refills: 0 | Status: SHIPPED | OUTPATIENT
Start: 2022-01-05 | End: 2022-01-11

## 2022-01-05 ASSESSMENT — ENCOUNTER SYMPTOMS: BACK PAIN: 1

## 2022-01-05 NOTE — PATIENT INSTRUCTIONS
Patient Education        Stopping Smoking: Care Instructions  Your Care Instructions     Cigarette smokers crave the nicotine in cigarettes. Giving it up is much harder than simply changing a habit. Your body has to stop craving the nicotine. It is hard to quit, but you can do it. There are many tools that people use to quit smoking. You may find that combining tools works best for you. There are several steps to quitting. First you get ready to quit. Then you get support to help you. After that, you learn new skills and behaviors to become a nonsmoker. For many people, a necessary step is getting and using medicine. Your doctor will help you set up the plan that best meets your needs. You may want to attend a smoking cessation program to help you quit smoking. When you choose a program, look for one that has proven success. Ask your doctor for ideas. You will greatly increase your chances of success if you take medicine as well as get counseling or join a cessation program.  Some of the changes you feel when you first quit tobacco are uncomfortable. Your body will miss the nicotine at first, and you may feel short-tempered and grumpy. You may have trouble sleeping or concentrating. Medicine can help you deal with these symptoms. You may struggle with changing your smoking habits and rituals. The last step is the tricky one: Be prepared for the smoking urge to continue for a time. This is a lot to deal with, but keep at it. You will feel better. Follow-up care is a key part of your treatment and safety. Be sure to make and go to all appointments, and call your doctor if you are having problems. It's also a good idea to know your test results and keep a list of the medicines you take. How can you care for yourself at home? · Ask your family, friends, and coworkers for support. You have a better chance of quitting if you have help and support.   · Join a support group, such as Nicotine Anonymous, for people who are trying to quit smoking. · Consider signing up for a smoking cessation program, such as the American Lung Association's Freedom from Smoking program.  · Get text messaging support. Go to the website at www.smokefree. gov to sign up for the [facility] program.  · Set a quit date. Pick your date carefully so that it is not right in the middle of a big deadline or stressful time. Once you quit, do not even take a puff. Get rid of all ashtrays and lighters after your last cigarette. Clean your house and your clothes so that they do not smell of smoke. · Learn how to be a nonsmoker. Think about ways you can avoid those things that make you reach for a cigarette. ? Avoid situations that put you at greatest risk for smoking. For some people, it is hard to have a drink with friends without smoking. For others, they might skip a coffee break with coworkers who smoke. ? Change your daily routine. Take a different route to work or eat a meal in a different place. · Cut down on stress. Calm yourself or release tension by doing an activity you enjoy, such as reading a book, taking a hot bath, or gardening. · Talk to your doctor or pharmacist about nicotine replacement therapy, which replaces the nicotine in your body. You still get nicotine but you do not use tobacco. Nicotine replacement products help you slowly reduce the amount of nicotine you need. These products come in several forms, many of them available over-the-counter:  ? Nicotine patches  ? Nicotine gum and lozenges  ? Nicotine inhaler  · Ask your doctor about bupropion (Wellbutrin) or varenicline (Chantix), which are prescription medicines. They do not contain nicotine. They help you by reducing withdrawal symptoms, such as stress and anxiety. · Some people find hypnosis, acupuncture, and massage helpful for ending the smoking habit. · Eat a healthy diet and get regular exercise.  Having healthy habits will help your body move past its craving for nicotine. · Be prepared to keep trying. Most people are not successful the first few times they try to quit. Do not get mad at yourself if you smoke again. Make a list of things you learned and think about when you want to try again, such as next week, next month, or next year. Where can you learn more? Go to https://[employer]pepicewtheresa.[employer]. org and sign in to your [employer] account. Enter P228 in the [employer] box to learn more about \"Stopping Smoking: Care Instructions. \"     If you do not have an account, please click on the \"Sign Up Now\" link. Current as of: February 11, 2021               Content Version: 13.1  © 2006-2021 Healthwise, Incorporated. Care instructions adapted under license by [facility] ([facility]). If you have questions about a medical condition or this instruction, always ask your healthcare professional. [address] any warranty or liability for your use of this information.

## 2022-01-05 NOTE — PROGRESS NOTES
Chief Complaint   Patient presents with    Hypertension    Diabetes       HPI:  Nabil Benitez is a 78 y.o. (: 1955) here today   for   Hypertension  This is a chronic problem. The current episode started more than 1 year ago. The problem is unchanged. The problem is controlled. Pertinent negatives include no chest pain or headaches. Risk factors for coronary artery disease include diabetes mellitus. Past treatments include diuretics. The current treatment provides moderate improvement. There are no compliance problems. Diabetes  She presents for her follow-up diabetic visit. She has type 2 diabetes mellitus. Pertinent negatives for hypoglycemia include no headaches. Pertinent negatives for diabetes include no chest pain. Risk factors for coronary artery disease include diabetes mellitus, hypertension and tobacco exposure. Current diabetic treatment includes oral agent (monotherapy). Frequency home blood tests: 2 times a day. (125 this morning) An ACE inhibitor/angiotensin II receptor blocker is not being taken. Patient is having increased issues with back pain. Bending down makes pain worse. Worse over past mo. No new injury. No changes in bowel or bladder habits. Worse recently. Had seen back specialist several yrs ago. Some depressive issues. Lost brother 2 yrs ago and his birthday is today. Overall, celexa working fairly well.     Daun Love seemed to work better than tradjenta. Out of tradjenta for approx 2 weeks. Patient's medications, allergies, past medical, surgical, social and family histories were reviewed and updated as appropriate. ROS:  Review of Systems   Constitutional: Negative for fever. Cardiovascular: Negative for chest pain. Musculoskeletal: Positive for back pain. Neurological: Negative for headaches.            Hemoglobin A1C (%)   Date Value   2021 8.1     Microscopic Examination (no units)   Date Value   2020 YES     Microalbumin, Random Urine (mg/dL)   Date Value   07/26/2021 <1.20     LDL Calculated (mg/dL)   Date Value   07/26/2021 70       Past Medical History:   Diagnosis Date    Anxiety     Arthritis     Depression     Disc degeneration 2014    Frequent UTI     Hyperlipidemia     Hypertension     Liver cirrhosis ([facility] [address] 12/18/2019    Type 2 diabetes mellitus without complication, without long-term current use of insulin ([facility]        Family History   Problem Relation Age of Onset    Diabetes Father     Cancer Father         colon    Cancer Sister         anal cancer, kidney cancer    High Blood Pressure Sister     Cancer Brother         stomach (unsure)    Depression Sister     High Blood Pressure Sister        Social History     Socioeconomic History    Marital status:      Spouse name: Not on file    Number of children: 3    Years of education: Not on file    Highest education level: Not on file   Occupational History    Not on file   Tobacco Use    Smoking status: Current Every Day Smoker     Packs/day: 2.00     Years: 45.00     Pack years: 90.00     Types: Cigarettes    Smokeless tobacco: Never Used   Vaping Use    Vaping Use: Never used   Substance and Sexual Activity    Alcohol use: No     Alcohol/week: 0.0 standard drinks     Comment: rare beer maybe 1-2 year     Drug use: No    Sexual activity: Yes     Partners: Male   Other Topics Concern    Not on file   Social History Narrative    11/2014 lives with spouse and grandson;home//raising grandchildren;     Social Determinants of Health     Financial Resource Strain:     Difficulty of Paying Living Expenses: Not on file   Food Insecurity:     Worried About Running Out of Food in the Last Year: Not on file    Piero of Food in the Last Year: Not on file   Transportation Needs:     Lack of Transportation (Medical): Not on file    Lack of Transportation (Non-Medical):  Not on file   Physical Activity:     Days of Exercise per Week: Not on file    Minutes of Exercise per Session: Not on file   Stress:     Feeling of Stress : Not on file   Social Connections:     Frequency of Communication with Friends and Family: Not on file    Frequency of Social Gatherings with Friends and Family: Not on file    Attends Latter day Services: Not on file    Active Member of Clubs or Organizations: Not on file    Attends Club or Organization Meetings: Not on file    Marital Status: Not on file   Intimate Partner Violence:     Fear of Current or Ex-Partner: Not on file    Emotionally Abused: Not on file    Physically Abused: Not on file    Sexually Abused: Not on file   Housing Stability:     Unable to Pay for Housing in the Last Year: Not on file    Number of Juan in the Last Year: Not on file    Unstable Housing in the Last Year: Not on file       Prior to Visit Medications    Medication Sig Taking? Authorizing Provider   methylPREDNISolone (MEDROL DOSEPACK) 4 MG tablet Take by mouth. Yes Yakelin Hull MD   gabapentin (NEURONTIN) 100 MG capsule Take 1 capsule by mouth 3 times daily for 30 days.  Yes Yakelin Hull MD   tiZANidine (ZANAFLEX) 4 MG tablet Take 1 tablet by mouth every 8 hours as needed (spasm) Yes Yakelin Hull MD   Fluticasone-Umeclidin-Vilant (TRELEGY ELLIPTA) 200-62.5-25 MCG/INH AEPB Inhale 1 puff into the lungs daily Yes Kinsey Maxwell MD   clonazePAM (KLONOPIN) 0.5 MG tablet TAKE 1 TABLET TWICE DAILY AS NEEDED FOR ANXIETY FOR UP TO 30 DAYS Yes Yakelin Hull MD   metFORMIN (GLUCOPHAGE-XR) 500 MG extended release tablet TAKE 1 TABLET BY MOUTH 2 TIMES DAILY Yes Yakelin Hull MD   citalopram (CELEXA) 40 MG tablet TAKE 1 TABLET BY MOUTH NIGHTLY Yes Yakelin Hull MD   albuterol sulfate  (90 Base) MCG/ACT inhaler INHALE 2 PUFFS INTO THE LUNGS EVERY 6 HOURS AS NEEDED FOR WHEEZING Yes Yakelin Hull MD   atorvastatin (LIPITOR) 20 MG tablet TAKE 1 TABLET NIGHTLY Yes Yakelin Hull MD   SITagliptin (JANUVIA) 100 MG tablet Take 1 tablet by mouth daily Yes Kassi Carbajal MD   furosemide (LASIX) 20 MG tablet TAKE (1) TABLET DAILY Yes Windy Person, APRN - CNP   mirtazapine (REMERON) 15 MG tablet TAKE 1 TABLET BY MOUTH NIGHTLY Yes Windy Person, APRN - CNP   potassium chloride (KLOR-CON M) 10 MEQ extended release tablet Take 2 tablets by mouth daily Yes Windy Person, APRN - CNP   Multiple Vitamins-Minerals (THERAPEUTIC MULTIVITAMIN-MINERALS) tablet Take 1 tablet by mouth daily. Yes Historical Provider, MD       Allergies   Allergen Reactions    Lisinopril      Angioedema 1/27/2017    Trazodone And Nefazodone Other (See Comments)     nightmares    Zithromax [Azithromycin Dihydrate] Nausea And Vomiting       OBJECTIVE:    /80   Pulse 84   Ht 5' 5\" (1.651 m)   Wt 244 lb 12.8 oz (111 kg)   SpO2 96%   BMI 40.74 kg/m²     BP Readings from Last 2 Encounters:   01/05/22 132/80   12/27/21 130/84       Wt Readings from Last 3 Encounters:   01/05/22 244 lb 12.8 oz (111 kg)   12/27/21 245 lb (111.1 kg)   11/17/21 249 lb 3.2 oz (113 kg)       Physical Exam  Constitutional:       Appearance: Normal appearance. HENT:      Head: Normocephalic and atraumatic. Eyes:      Extraocular Movements: Extraocular movements intact. Cardiovascular:      Rate and Rhythm: Normal rate and regular rhythm. Pulmonary:      Effort: Pulmonary effort is normal.      Breath sounds: Decreased breath sounds present. Musculoskeletal:      Lumbar back: Spasms and tenderness present. Neurological:      General: No focal deficit present. Mental Status: She is alert and oriented to person, place, and time. Psychiatric:         Mood and Affect: Mood normal.         Behavior: Behavior normal.           ASSESSMENT/PLAN:    1. Primary hypertension  The current medical regimen is effective;  continue present plan and medications. 2. Other depression  The current medical regimen is effective;  continue present plan and medications.      3. Type 2 diabetes mellitus without complication, without long-term current use of insulin ([facility] [address]  poc ha1c today 6.8. Improved. Had been on dpp4. Trial of janumet xr daily and stop metformin. F/u in approx 4 mo. - POCT glycosylated hemoglobin (Hb A1C)  - gabapentin (NEURONTIN) 100 MG capsule; Take 1 capsule by mouth 3 times daily for 30 days. Dispense: 90 capsule; Refill: 2    4. Cirrhosis of liver without ascites, unspecified hepatic cirrhosis type (HCC)  Last lft approx 2 mo ago ok    5. Burning sensation of feet  Adjust meds as below to improve back pain. Some improvement noted. - gabapentin (NEURONTIN) 100 MG capsule; Take 1 capsule by mouth 3 times daily for 30 days. Dispense: 90 capsule; Refill: 2    6. DDD (degenerative disc disease), lumbar  Seems to be exacerbation of chronic dz. Steroid pack, prn muscle relaxer, inc gabapentin. Imaging if fails to improve. Has seen back specialist in the past.    - methylPREDNISolone (MEDROL DOSEPACK) 4 MG tablet; Take by mouth. Dispense: 1 kit; Refill: 0  - gabapentin (NEURONTIN) 100 MG capsule; Take 1 capsule by mouth 3 times daily for 30 days. Dispense: 90 capsule; Refill: 2    7. Need for vaccination for pneumococcus    - PNEUMOVAX 23 subcutaneous/IM (Pneumococcal polysaccharide vaccine 23-valent >= 3yo)    8.  Need for immunization against influenza    - INFLUENZA, QUADV, ADJUVANTED, 65 YRS =, IM, PF, PREFILL SYR, 0.5ML (FLUAD)

## 2022-02-08 ENCOUNTER — TELEPHONE (OUTPATIENT)
Dept: PULMONOLOGY | Age: 67
End: 2022-02-08

## 2022-02-08 DIAGNOSIS — J44.9 CHRONIC OBSTRUCTIVE PULMONARY DISEASE, UNSPECIFIED COPD TYPE (HCC): Primary | ICD-10-CM

## 2022-02-08 DIAGNOSIS — E11.9 TYPE 2 DIABETES MELLITUS WITHOUT COMPLICATION, WITHOUT LONG-TERM CURRENT USE OF INSULIN (HCC): ICD-10-CM

## 2022-02-08 DIAGNOSIS — E78.5 HYPERLIPIDEMIA WITH TARGET LDL LESS THAN 130: ICD-10-CM

## 2022-02-08 DIAGNOSIS — I10 ESSENTIAL HYPERTENSION: ICD-10-CM

## 2022-02-08 RX ORDER — METFORMIN HYDROCHLORIDE 500 MG/1
500 TABLET, EXTENDED RELEASE ORAL 2 TIMES DAILY
Qty: 180 TABLET | Refills: 3 | Status: SHIPPED | OUTPATIENT
Start: 2022-02-08 | End: 2022-09-29

## 2022-02-08 RX ORDER — FLUTICASONE FUROATE, UMECLIDINIUM BROMIDE AND VILANTEROL TRIFENATATE 200; 62.5; 25 UG/1; UG/1; UG/1
1 POWDER RESPIRATORY (INHALATION) DAILY
Qty: 3 EACH | Refills: 3 | Status: SHIPPED | OUTPATIENT
Start: 2022-02-08 | End: 2022-09-29 | Stop reason: SDUPTHER

## 2022-02-08 RX ORDER — ATORVASTATIN CALCIUM 20 MG/1
TABLET, FILM COATED ORAL
Qty: 90 TABLET | Refills: 3 | Status: SHIPPED | OUTPATIENT
Start: 2022-02-08

## 2022-02-08 RX ORDER — TIZANIDINE 4 MG/1
4 TABLET ORAL EVERY 8 HOURS PRN
Qty: 30 TABLET | Refills: 0 | Status: SHIPPED | OUTPATIENT
Start: 2022-02-08 | End: 2022-03-14

## 2022-02-08 RX ORDER — FLUTICASONE FUROATE, UMECLIDINIUM BROMIDE AND VILANTEROL TRIFENATATE 200; 62.5; 25 UG/1; UG/1; UG/1
1 POWDER RESPIRATORY (INHALATION) DAILY
Qty: 2 EACH | Refills: 0 | COMMUNITY
Start: 2022-02-08 | End: 2022-03-15 | Stop reason: SDUPTHER

## 2022-02-08 NOTE — TELEPHONE ENCOUNTER
Patient asking for samples of Trelegy 200 mg. Please advise. Order Pended. ASSESSMENT:12/27/21  · COPD/pulmonary emphysema  · Pulmonary nodules, 2 to 3 mm on LDCT  · Shortness of breath   · Greater than 100-pack-year tobacco history, has cut down from 3 pack down to 2 ppd     PLAN:  · Trelegy - give sample and patient assistance information again today   · Low dose chest CT for lung cancer screening in Feb 2022 should be cancelled. Next LDCT would be due in November 2022. Will schedule at next visit. · Tobacco cessation is imperative. She declined Wellbutrin due to multiple psychiatric medications. Chantix was not covered by insurance.   · See me in about 6 months

## 2022-02-08 NOTE — TELEPHONE ENCOUNTER
Order pended to [facility] in office. Patient dropped off patient assistance forms and financial information for [place]. ASSESSMENT:12/27/21  · COPD/pulmonary emphysema  · Pulmonary nodules, 2 to 3 mm on LDCT  · Shortness of breath   · Greater than 100-pack-year tobacco history, has cut down from 3 pack down to 2 ppd     PLAN:  · Trelegy - give sample and patient assistance information again today   · Low dose chest CT for lung cancer screening in Feb 2022 should be cancelled. Next LDCT would be due in November 2022. Will schedule at next visit. · Tobacco cessation is imperative. She declined Wellbutrin due to multiple psychiatric medications. Chantix was not covered by insurance.   · See me in about 6 months

## 2022-02-08 NOTE — TELEPHONE ENCOUNTER
Pt called she needs tizanidine 4 mg and MetFormin 500 mg called into [facility]

## 2022-03-14 RX ORDER — TIZANIDINE 4 MG/1
4 TABLET ORAL EVERY 8 HOURS PRN
Qty: 30 TABLET | Refills: 0 | Status: SHIPPED | OUTPATIENT
Start: 2022-03-14 | End: 2022-09-29

## 2022-03-15 ENCOUNTER — OFFICE VISIT (OUTPATIENT)
Dept: FAMILY MEDICINE CLINIC | Age: 67
End: 2022-03-15
Payer: MEDICARE

## 2022-03-15 VITALS
WEIGHT: 242 LBS | HEIGHT: 65 IN | SYSTOLIC BLOOD PRESSURE: 122 MMHG | OXYGEN SATURATION: 97 % | HEART RATE: 88 BPM | DIASTOLIC BLOOD PRESSURE: 84 MMHG | BODY MASS INDEX: 40.32 KG/M2

## 2022-03-15 DIAGNOSIS — G89.29 CHRONIC LEFT-SIDED LOW BACK PAIN WITH LEFT-SIDED SCIATICA: Primary | ICD-10-CM

## 2022-03-15 DIAGNOSIS — M51.36 DDD (DEGENERATIVE DISC DISEASE), LUMBAR: ICD-10-CM

## 2022-03-15 DIAGNOSIS — M54.42 CHRONIC LEFT-SIDED LOW BACK PAIN WITH LEFT-SIDED SCIATICA: Primary | ICD-10-CM

## 2022-03-15 PROCEDURE — 99213 OFFICE O/P EST LOW 20 MIN: CPT | Performed by: FAMILY MEDICINE

## 2022-03-15 RX ORDER — METHYLPREDNISOLONE 4 MG/1
TABLET ORAL
Qty: 1 KIT | Refills: 0 | Status: SHIPPED | OUTPATIENT
Start: 2022-03-15 | End: 2022-03-21

## 2022-03-15 ASSESSMENT — ENCOUNTER SYMPTOMS
BOWEL INCONTINENCE: 0
BACK PAIN: 1

## 2022-03-15 NOTE — PROGRESS NOTES
Chief Complaint   Patient presents with    Back Pain       HPI:  Francine Calzada is a 78 y.o. (: 1955) here today   for back pain that has been worse the past week. Back Pain  This is a chronic problem. The current episode started more than 1 year ago. The problem has been gradually worsening since onset. The pain is present in the lumbar spine. The pain radiates to the left thigh and left knee. The symptoms are aggravated by bending, twisting, sitting and standing. Associated symptoms include leg pain and tingling. Pertinent negatives include no bladder incontinence, bowel incontinence, fever, numbness, weakness or weight loss. Treatments tried: massage device, tylenol, blue emu, topical pain relief. The treatment provided mild relief.   stinging and burning down left leg. Worse over past couple of weeks. No new falls, trauma, or injury. Was aggravated by doing laundry. Has to bend over to get laundry out of washer. Aggravated by prolonged sitting, standing, bending, twisting. Pain more tolerable if lying flat. No changes in bowel/bladder control. Pain further up the back than prior, but worst in lower back. Mild bried relief w/ otc tx. Has not picked up muscle relaxer yet. Had been seen by pain mgmt in the remote past.      Patient's medications, allergies, past medical, surgical, social and family histories were reviewed and updated as appropriate. ROS:  Review of Systems   Constitutional: Negative for fever and weight loss. Gastrointestinal: Negative for bowel incontinence. Genitourinary: Negative for bladder incontinence. Musculoskeletal: Positive for back pain. Neurological: Positive for tingling. Negative for weakness and numbness. Prior to Visit Medications    Medication Sig Taking? Authorizing Provider   methylPREDNISolone (MEDROL DOSEPACK) 4 MG tablet Take by mouth.  Yes Dane Grubbs MD   tiZANidine (ZANAFLEX) 4 MG tablet TAKE 1 TABLET BY MOUTH EVERY 8 HOURS AS NEEDED (SPASM) Yes Dane Grubbs MD   Fluticasone-Umeclidin-Vilant (TRELEGY ELLIPTA) 200-62.5-25 MCG/INH AEPB Inhale 1 puff into the lungs daily Yes [place], PA-C   metFORMIN (GLUCOPHAGE-XR) 500 MG extended release tablet Take 1 tablet by mouth 2 times daily Yes Dane Grubbs MD   atorvastatin (LIPITOR) 20 MG tablet TAKE 1 TABLET NIGHTLY Yes Dane Grubbs MD   gabapentin (NEURONTIN) 100 MG capsule Take 1 capsule by mouth 3 times daily for 30 days. Yes Dane Grubbs MD   clonazePAM (KLONOPIN) 0.5 MG tablet TAKE 1 TABLET TWICE DAILY AS NEEDED FOR ANXIETY FOR UP TO 30 DAYS Yes Dane Grubbs MD   citalopram (CELEXA) 40 MG tablet TAKE 1 TABLET BY MOUTH NIGHTLY Yes Dane Grubbs MD   albuterol sulfate  (90 Base) MCG/ACT inhaler INHALE 2 PUFFS INTO THE LUNGS EVERY 6 HOURS AS NEEDED FOR WHEEZING Yes Dane Grubbs MD   furosemide (LASIX) 20 MG tablet TAKE (1) TABLET DAILY Yes Gerhardt Africa, APRN - CNP   mirtazapine (REMERON) 15 MG tablet TAKE 1 TABLET BY MOUTH NIGHTLY Yes Gerhardt Africa, APRN - CNP   Multiple Vitamins-Minerals (THERAPEUTIC MULTIVITAMIN-MINERALS) tablet Take 1 tablet by mouth daily. Yes Historical Provider, MD       Allergies   Allergen Reactions    Lisinopril      Angioedema 1/27/2017    Trazodone And Nefazodone Other (See Comments)     nightmares    Zithromax [Azithromycin Dihydrate] Nausea And Vomiting       OBJECTIVE:    /84   Pulse 88   Ht 5' 5\" (1.651 m)   Wt 242 lb (109.8 kg)   SpO2 97%   BMI 40.27 kg/m²     BP Readings from Last 2 Encounters:   03/15/22 122/84   01/05/22 132/80       Wt Readings from Last 3 Encounters:   03/15/22 242 lb (109.8 kg)   01/05/22 244 lb 12.8 oz (111 kg)   12/27/21 245 lb (111.1 kg)       Physical Exam  Constitutional:       Appearance: Normal appearance. Eyes:      Extraocular Movements: Extraocular movements intact. Cardiovascular:      Rate and Rhythm: Normal rate and regular rhythm.    Pulmonary:      Effort: Pulmonary effort is normal.      Breath sounds: Normal breath sounds. Musculoskeletal:      Lumbar back: Spasms and tenderness present. Comments: Pain over low back and bilat SI region. Worse on left. Pain to left buttock. Neurological:      Mental Status: She is alert. Psychiatric:         Mood and Affect: Mood normal.           ASSESSMENT/PLAN:     1. Chronic left-sided low back pain with left-sided sciatica  Has muscle relaxer at pharmacy. Can titrate gabapentin up to 200mg tid as anton. Trial of steroid pack as below. Has been some time since last xray. rec repeat given worsening sxs. May need back to pain mgmt in the future if fails to improve. Has been given HEP in the past  - methylPREDNISolone (MEDROL DOSEPACK) 4 MG tablet; Take by mouth. Dispense: 1 kit; Refill: 0  - XR LUMBOSACRAL W OBLIQUES AND FLEXION AND EXTENSION;  Future Yes

## 2022-03-15 NOTE — PATIENT INSTRUCTIONS
Patient Education        Stopping Smoking: Care Instructions  Your Care Instructions     Cigarette smokers crave the nicotine in cigarettes. Giving it up is much harder than simply changing a habit. Your body has to stop craving the nicotine. It is hard to quit, but you can do it. There are many tools that people use to quit smoking. You may find that combining tools works best for you. There are several steps to quitting. First you get ready to quit. Then you get support to help you. After that, you learn new skills and behaviors to become a nonsmoker. For many people, a necessary step is getting and using medicine. Your doctor will help you set up the plan that best meets your needs. You may want to attend a smoking cessation program to help you quit smoking. When you choose a program, look for one that has proven success. Ask your doctor for ideas. You will greatly increase your chances of success if you take medicine as well as get counseling or join a cessation program.  Some of the changes you feel when you first quit tobacco are uncomfortable. Your body will miss the nicotine at first, and you may feel short-tempered and grumpy. You may have trouble sleeping or concentrating. Medicine can help you deal with these symptoms. You may struggle with changing your smoking habits and rituals. The last step is the tricky one: Be prepared for the smoking urge to continue for a time. This is a lot to deal with, but keep at it. You will feel better. Follow-up care is a key part of your treatment and safety. Be sure to make and go to all appointments, and call your doctor if you are having problems. It's also a good idea to know your test results and keep a list of the medicines you take. How can you care for yourself at home? · Ask your family, friends, and coworkers for support. You have a better chance of quitting if you have help and support.   · Join a support group, such as Nicotine Anonymous, for people who are trying to quit smoking. · Consider signing up for a smoking cessation program, such as the American Lung Association's Freedom from Smoking program.  · Get text messaging support. Go to the website at www.smokefree. gov to sign up for the [facility] program.  · Set a quit date. Pick your date carefully so that it is not right in the middle of a big deadline or stressful time. Once you quit, do not even take a puff. Get rid of all ashtrays and lighters after your last cigarette. Clean your house and your clothes so that they do not smell of smoke. · Learn how to be a nonsmoker. Think about ways you can avoid those things that make you reach for a cigarette. ? Avoid situations that put you at greatest risk for smoking. For some people, it is hard to have a drink with friends without smoking. For others, they might skip a coffee break with coworkers who smoke. ? Change your daily routine. Take a different route to work or eat a meal in a different place. · Cut down on stress. Calm yourself or release tension by doing an activity you enjoy, such as reading a book, taking a hot bath, or gardening. · Talk to your doctor or pharmacist about nicotine replacement therapy, which replaces the nicotine in your body. You still get nicotine but you do not use tobacco. Nicotine replacement products help you slowly reduce the amount of nicotine you need. These products come in several forms, many of them available over-the-counter:  ? Nicotine patches  ? Nicotine gum and lozenges  ? Nicotine inhaler  · Ask your doctor about bupropion (Wellbutrin) or varenicline (Chantix), which are prescription medicines. They do not contain nicotine. They help you by reducing withdrawal symptoms, such as stress and anxiety. · Some people find hypnosis, acupuncture, and massage helpful for ending the smoking habit. · Eat a healthy diet and get regular exercise.  Having healthy habits will help your body move past its craving for nicotine. · Be prepared to keep trying. Most people are not successful the first few times they try to quit. Do not get mad at yourself if you smoke again. Make a list of things you learned and think about when you want to try again, such as next week, next month, or next year. Where can you learn more? Go to https://[employer])pepicewtheresa.Oink. org and sign in to your [employer] account. Enter F419 in the [employer] box to learn more about \"Stopping Smoking: Care Instructions. \"     If you do not have an account, please click on the \"Sign Up Now\" link. Current as of: February 11, 2021               Content Version: 13.1  © 2006-2021 Healthwise, Incorporated. Care instructions adapted under license by [facility] ([facility]). If you have questions about a medical condition or this instruction, always ask your healthcare professional. [address] any warranty or liability for your use of this information.

## 2022-03-25 ENCOUNTER — HOSPITAL ENCOUNTER (OUTPATIENT)
Dept: GENERAL RADIOLOGY | Age: 67
Discharge: HOME | End: 2022-03-25
Payer: MEDICARE

## 2022-03-25 ENCOUNTER — HOSPITAL ENCOUNTER (OUTPATIENT)
Age: 67
Discharge: HOME | End: 2022-03-25
Payer: MEDICARE

## 2022-03-25 DIAGNOSIS — G89.29 CHRONIC LEFT-SIDED LOW BACK PAIN WITH LEFT-SIDED SCIATICA: ICD-10-CM

## 2022-03-25 DIAGNOSIS — M54.42 CHRONIC LEFT-SIDED LOW BACK PAIN WITH LEFT-SIDED SCIATICA: ICD-10-CM

## 2022-03-25 PROCEDURE — 72114 X-RAY EXAM L-S SPINE BENDING: CPT

## 2022-03-28 NOTE — RESULT ENCOUNTER NOTE
X-ray shows multilevel mild to moderate degenerative changes. No acute finding noted. If ongoing or worsening symptoms, could consider referral back to pain management.

## 2022-04-18 DIAGNOSIS — M51.36 DDD (DEGENERATIVE DISC DISEASE), LUMBAR: ICD-10-CM

## 2022-04-18 DIAGNOSIS — E11.9 TYPE 2 DIABETES MELLITUS WITHOUT COMPLICATION, WITHOUT LONG-TERM CURRENT USE OF INSULIN (HCC): ICD-10-CM

## 2022-04-18 DIAGNOSIS — R20.8 BURNING SENSATION OF FEET: ICD-10-CM

## 2022-04-18 RX ORDER — GABAPENTIN 100 MG/1
100 CAPSULE ORAL 3 TIMES DAILY
Qty: 90 CAPSULE | Refills: 2 | Status: SHIPPED | OUTPATIENT
Start: 2022-04-18 | End: 2022-08-16

## 2022-05-05 ENCOUNTER — OFFICE VISIT (OUTPATIENT)
Dept: FAMILY MEDICINE CLINIC | Age: 67
End: 2022-05-05
Payer: MEDICARE

## 2022-05-05 VITALS
OXYGEN SATURATION: 96 % | BODY MASS INDEX: 40.92 KG/M2 | WEIGHT: 245.6 LBS | HEIGHT: 65 IN | HEART RATE: 84 BPM | DIASTOLIC BLOOD PRESSURE: 76 MMHG | SYSTOLIC BLOOD PRESSURE: 110 MMHG

## 2022-05-05 DIAGNOSIS — R06.2 WHEEZING: ICD-10-CM

## 2022-05-05 DIAGNOSIS — E78.5 HYPERLIPIDEMIA WITH TARGET LDL LESS THAN 130: ICD-10-CM

## 2022-05-05 DIAGNOSIS — I10 PRIMARY HYPERTENSION: ICD-10-CM

## 2022-05-05 DIAGNOSIS — E11.9 TYPE 2 DIABETES MELLITUS WITHOUT COMPLICATION, WITHOUT LONG-TERM CURRENT USE OF INSULIN (HCC): Primary | ICD-10-CM

## 2022-05-05 DIAGNOSIS — J44.9 CHRONIC OBSTRUCTIVE PULMONARY DISEASE, UNSPECIFIED COPD TYPE (HCC): ICD-10-CM

## 2022-05-05 DIAGNOSIS — K74.60 CIRRHOSIS OF LIVER WITHOUT ASCITES, UNSPECIFIED HEPATIC CIRRHOSIS TYPE (HCC): ICD-10-CM

## 2022-05-05 DIAGNOSIS — R06.02 SHORTNESS OF BREATH: ICD-10-CM

## 2022-05-05 DIAGNOSIS — F32.89 OTHER DEPRESSION: ICD-10-CM

## 2022-05-05 PROCEDURE — 99214 OFFICE O/P EST MOD 30 MIN: CPT | Performed by: FAMILY MEDICINE

## 2022-05-05 PROCEDURE — 3044F HG A1C LEVEL LT 7.0%: CPT | Performed by: FAMILY MEDICINE

## 2022-05-05 RX ORDER — ALBUTEROL SULFATE 90 UG/1
2 AEROSOL, METERED RESPIRATORY (INHALATION) EVERY 6 HOURS PRN
Qty: 18 G | Refills: 3 | Status: SHIPPED | OUTPATIENT
Start: 2022-05-05

## 2022-05-05 RX ORDER — BUPROPION HYDROCHLORIDE 150 MG/1
150 TABLET ORAL EVERY MORNING
Qty: 30 TABLET | Refills: 3 | Status: SHIPPED | OUTPATIENT
Start: 2022-05-05 | End: 2022-09-19

## 2022-05-05 ASSESSMENT — PATIENT HEALTH QUESTIONNAIRE - PHQ9
1. LITTLE INTEREST OR PLEASURE IN DOING THINGS: 2
SUM OF ALL RESPONSES TO PHQ QUESTIONS 1-9: 8
5. POOR APPETITE OR OVEREATING: 0
3. TROUBLE FALLING OR STAYING ASLEEP: 2
SUM OF ALL RESPONSES TO PHQ QUESTIONS 1-9: 8
4. FEELING TIRED OR HAVING LITTLE ENERGY: 2
SUM OF ALL RESPONSES TO PHQ QUESTIONS 1-9: 8
9. THOUGHTS THAT YOU WOULD BE BETTER OFF DEAD, OR OF HURTING YOURSELF: 0
SUM OF ALL RESPONSES TO PHQ9 QUESTIONS 1 & 2: 4
SUM OF ALL RESPONSES TO PHQ QUESTIONS 1-9: 8
7. TROUBLE CONCENTRATING ON THINGS, SUCH AS READING THE NEWSPAPER OR WATCHING TELEVISION: 0
6. FEELING BAD ABOUT YOURSELF - OR THAT YOU ARE A FAILURE OR HAVE LET YOURSELF OR YOUR FAMILY DOWN: 0
8. MOVING OR SPEAKING SO SLOWLY THAT OTHER PEOPLE COULD HAVE NOTICED. OR THE OPPOSITE, BEING SO FIGETY OR RESTLESS THAT YOU HAVE BEEN MOVING AROUND A LOT MORE THAN USUAL: 0
2. FEELING DOWN, DEPRESSED OR HOPELESS: 2

## 2022-05-05 ASSESSMENT — ENCOUNTER SYMPTOMS
SHORTNESS OF BREATH: 1
COUGH: 1
ABDOMINAL PAIN: 0

## 2022-05-05 NOTE — PATIENT INSTRUCTIONS
Patient Education        Stopping Smoking: Care Instructions  Your Care Instructions     Cigarette smokers crave the nicotine in cigarettes. Giving it up is much harder than simply changing a habit. Your body has to stop craving the nicotine. It is hard to quit, but you can do it. There are many tools that people use to quitsmoking. You may find that combining tools works best for you. There are several steps to quitting. First you get ready to quit. Then you get support to help you. After that, you learn new skills and behaviors to become anonsmoker. For many people, a necessary step is getting and using medicine. Your doctor will help you set up the plan that best meets your needs. You may want to attend a smoking cessation program to help you quit smoking. When you choose a program, look for one that has proven success. Ask your doctor for ideas. You will greatly increase your chances of success if you take medicineas well as get counseling or join a cessation program.  Some of the changes you feel when you first quit tobacco are uncomfortable. Your body will miss the nicotine at first, and you may feel short-tempered and grumpy. You may have trouble sleeping or concentrating. Medicine can help you deal with these symptoms. You may struggle with changing your smoking habits and rituals. The last step is the tricky one: Be prepared for the smoking urge to continue for a time. This is a lot to deal with, but keep at it. You willfeel better. Follow-up care is a key part of your treatment and safety. Be sure to make and go to all appointments, and call your doctor if you are having problems. It's also a good idea to know your test results and keep alist of the medicines you take. How can you care for yourself at home?  Ask your family, friends, and coworkers for support. You have a better chance of quitting if you have help and support.    Join a support group, such as Nicotine Anonymous, for people who are trying to quit smoking.  Consider signing up for a smoking cessation program, such as the American Lung Association's Freedom from Smoking program.   Get text messaging support. Go to the website at www.smokefree. gov to sign up for the [facility] program.   Set a quit date. Pick your date carefully so that it is not right in the middle of a big deadline or stressful time. Once you quit, do not even take a puff. Get rid of all ashtrays and lighters after your last cigarette. Clean your house and your clothes so that they do not smell of smoke.  Learn how to be a nonsmoker. Think about ways you can avoid those things that make you reach for a cigarette. ? Avoid situations that put you at greatest risk for smoking. For some people, it is hard to have a drink with friends without smoking. For others, they might skip a coffee break with coworkers who smoke. ? Change your daily routine. Take a different route to work or eat a meal in a different place.  Cut down on stress. Calm yourself or release tension by doing an activity you enjoy, such as reading a book, taking a hot bath, or gardening.  Talk to your doctor or pharmacist about nicotine replacement therapy, which replaces the nicotine in your body. You still get nicotine but you do not use tobacco. Nicotine replacement products help you slowly reduce the amount of nicotine you need. These products come in several forms, many of them available over-the-counter:  ? Nicotine patches  ? Nicotine gum and lozenges  ? Nicotine inhaler   Ask your doctor about bupropion (Wellbutrin) or varenicline (Chantix), which are prescription medicines. They do not contain nicotine. They help you by reducing withdrawal symptoms, such as stress and anxiety.  Some people find hypnosis, acupuncture, and massage helpful for ending the smoking habit.  Eat a healthy diet and get regular exercise.  Having healthy habits will help your body move past its craving for nicotine.  Be prepared to keep trying. Most people are not successful the first few times they try to quit. Do not get mad at yourself if you smoke again. Make a list of things you learned and think about when you want to try again, such as next week, next month, or next year. Where can you learn more? Go to https://Holvipej[employer]niorewtheresa.[employer]. org and sign in to your [employer] account. Enter Y677 in the [employer] box to learn more about \"Stopping Smoking: Care Instructions. \"     If you do not have an account, please click on the \"Sign Up Now\" link. Current as of: October 28, 2021               Content Version: 13.2  © 2006-2022 Healthwise, Incorporated. Care instructions adapted under license by [facility] ([facility]). If you have questions about a medical condition or this instruction, always ask your healthcare professional. [address] any warranty or liability for your use of this information.

## 2022-05-05 NOTE — PROGRESS NOTES
Chief Complaint   Patient presents with    Hypertension    Hyperlipidemia    Diabetes       HPI:  Maye Ugarte is a 78 y.o. (: 1955) here today   for   Hypertension  This is a chronic problem. The current episode started more than 1 year ago. The problem is unchanged. The problem is controlled. Associated symptoms include shortness of breath. Pertinent negatives include no headaches. Risk factors for coronary artery disease include dyslipidemia and diabetes mellitus. Past treatments include diuretics. The current treatment provides moderate improvement. There are no compliance problems. Hyperlipidemia  This is a chronic problem. The current episode started more than 1 year ago. Exacerbating diseases include diabetes. Associated symptoms include shortness of breath. Current antihyperlipidemic treatment includes statins. There are no compliance problems. Risk factors for coronary artery disease include dyslipidemia, diabetes mellitus and hypertension. Diabetes  She presents for her follow-up diabetic visit. She has type 2 diabetes mellitus. Pertinent negatives for hypoglycemia include no headaches. Risk factors for coronary artery disease include dyslipidemia, diabetes mellitus and hypertension. Current diabetic treatment includes oral agent (monotherapy). Frequency home blood tests: 1 time a day. An ACE inhibitor/angiotensin II receptor blocker is not being taken. Had been on lasix since prior allergic rxn to lisin. Mood has been worse. Less motivation. Does well w/ trelegy, but has sig issues w/ cost.     Did not take janumet. Just taking metformin. Sugars have overall been doing fairly well. Had a few days of sig elevation, but that was related to inc sugar in coffee. Eating less junk food. Hx of cirrhosis. Not seeing liver dr.  No new issues.      Patient's medications, allergies, past medical, surgical, social and family histories were reviewed and updated as appropriate. ROS:  Review of Systems   Constitutional: Negative for fever. Respiratory: Positive for cough and shortness of breath. Gastrointestinal: Negative for abdominal pain. Neurological: Negative for headaches. Psychiatric/Behavioral: Positive for dysphoric mood.            Hemoglobin A1C (%)   Date Value   01/05/2022 6.8     Microscopic Examination (no units)   Date Value   01/17/2020 YES     Microalbumin, Random Urine (mg/dL)   Date Value   07/26/2021 <1.20     LDL Calculated (mg/dL)   Date Value   07/26/2021 70       Past Medical History:   Diagnosis Date    Anxiety     Arthritis     Depression     Disc degeneration 2014    Frequent UTI     Hyperlipidemia     Hypertension     Liver cirrhosis ([facility] 12/18/2019    Type 2 diabetes mellitus without complication, without long-term current use of insulin ([facility]        Family History   Problem Relation Age of Onset    Diabetes Father     Cancer Father         colon    Cancer Sister         anal cancer, kidney cancer    High Blood Pressure Sister     Cancer Brother         stomach (unsure)    Depression Sister     High Blood Pressure Sister        Social History     Socioeconomic History    Marital status:      Spouse name: Not on file    Number of children: 3    Years of education: Not on file    Highest education level: Not on file   Occupational History    Not on file   Tobacco Use    Smoking status: Current Every Day Smoker     Packs/day: 2.00     Years: 45.00     Pack years: 90.00     Types: Cigarettes    Smokeless tobacco: Never Used   Vaping Use    Vaping Use: Never used   Substance and Sexual Activity    Alcohol use: No     Alcohol/week: 0.0 standard drinks     Comment: rare beer maybe 1-2 year     Drug use: No    Sexual activity: Yes     Partners: Male   Other Topics Concern    Not on file   Social History Narrative    11/2014 lives with spouse and grandson;home//raising grandchildren;     Social Determinants of Health     Financial Resource Strain:     Difficulty of Paying Living Expenses: Not on file   Food Insecurity:     Worried About Running Out of Food in the Last Year: Not on file    Piero of Food in the Last Year: Not on file   Transportation Needs:     Lack of Transportation (Medical): Not on file    Lack of Transportation (Non-Medical): Not on file   Physical Activity:     Days of Exercise per Week: Not on file    Minutes of Exercise per Session: Not on file   Stress:     Feeling of Stress : Not on file   Social Connections:     Frequency of Communication with Friends and Family: Not on file    Frequency of Social Gatherings with Friends and Family: Not on file    Attends Voodoo Services: Not on file    Active Member of [address] [place] [address] [employer] or Organizations: Not on file    Attends Club or Organization Meetings: Not on file    Marital Status: Not on file   Intimate Partner Violence:     Fear of Current or Ex-Partner: Not on file    Emotionally Abused: Not on file    Physically Abused: Not on file    Sexually Abused: Not on file   Housing Stability:     Unable to Pay for Housing in the Last Year: Not on file    Number of [place] in the Last Year: Not on file    Unstable Housing in the Last Year: Not on file       Prior to Visit Medications    Medication Sig Taking? Authorizing Provider   albuterol sulfate  (90 Base) MCG/ACT inhaler Inhale 2 puffs into the lungs every 6 hours as needed for Wheezing Yes Lieutenant Meredith MD   buPROPion (WELLBUTRIN XL) 150 MG extended release tablet Take 1 tablet by mouth every morning Yes Lieutenant Meredith MD   gabapentin (NEURONTIN) 100 MG capsule TAKE 1 CAPSULE BY MOUTH 3 TIMES DAILY FOR 30 DAYS.  Yes Lieutenant Meredith MD   tiZANidine (ZANAFLEX) 4 MG tablet TAKE 1 TABLET BY MOUTH EVERY 8 HOURS AS NEEDED (SPASM) Yes Lieutenant Meredith MD   Fluticasone-Umeclidin-Vilant (TRELEGY ELLIPTA) 200-62.5-25 MCG/INH AEPB Inhale 1 puff into the lungs daily Yes Renetta Roque PA-SHYAM   metFORMIN (GLUCOPHAGE-XR) 500 MG extended release tablet Take 1 tablet by mouth 2 times daily Yes Ofelia Hendricks MD   atorvastatin (LIPITOR) 20 MG tablet TAKE 1 TABLET NIGHTLY Yes Ofelia Hendricks MD   clonazePAM (KLONOPIN) 0.5 MG tablet TAKE 1 TABLET TWICE DAILY AS NEEDED FOR ANXIETY FOR UP TO 30 DAYS Yes Ofelia Hendricks MD   citalopram (CELEXA) 40 MG tablet TAKE 1 TABLET BY MOUTH NIGHTLY Yes Ofelia Hendricks MD   furosemide (LASIX) 20 MG tablet TAKE (1) TABLET DAILY Yes FARHAT Jacques CNP   mirtazapine (REMERON) 15 MG tablet TAKE 1 TABLET BY MOUTH NIGHTLY Yes FARHAT Jacques CNP   Multiple Vitamins-Minerals (THERAPEUTIC MULTIVITAMIN-MINERALS) tablet Take 1 tablet by mouth daily. Yes Historical Provider, MD       Allergies   Allergen Reactions    Lisinopril      Angioedema 1/27/2017    Trazodone And Nefazodone Other (See Comments)     nightmares    Zithromax [Azithromycin Dihydrate] Nausea And Vomiting       OBJECTIVE:    /76   Pulse 84   Ht 5' 5\" (1.651 m)   Wt 245 lb 9.6 oz (111.4 kg)   SpO2 96%   BMI 40.87 kg/m²     BP Readings from Last 2 Encounters:   05/05/22 110/76   03/15/22 122/84       Wt Readings from Last 3 Encounters:   05/05/22 245 lb 9.6 oz (111.4 kg)   03/15/22 242 lb (109.8 kg)   01/05/22 244 lb 12.8 oz (111 kg)       Physical Exam  Constitutional:       Appearance: Normal appearance. HENT:      Head: Normocephalic and atraumatic. Eyes:      Extraocular Movements: Extraocular movements intact. Cardiovascular:      Rate and Rhythm: Normal rate and regular rhythm. Pulmonary:      Effort: Pulmonary effort is normal.      Breath sounds: Decreased breath sounds present. Neurological:      General: No focal deficit present. Mental Status: She is alert and oriented to person, place, and time. Psychiatric:         Mood and Affect: Mood normal.         Behavior: Behavior normal.           ASSESSMENT/PLAN:    1.  Type 2 diabetes mellitus without complication, without long-term current use of insulin ([facility] [address]  [place]. Could not afford janumet. Pt to check w/ insurance re med options. - Lipid Panel; Future  - Hemoglobin A1C; Future  - Comprehensive Metabolic Panel; Future    2. Other depression  Try adding med as below to help w/ motivation. Had been on the past alone for smoking cessation and tolerated. Unsure how much it helped. Try adding to celexa  - buPROPion (WELLBUTRIN XL) 150 MG extended release tablet; Take 1 tablet by mouth every morning  Dispense: 30 tablet; Refill: 3    3. Shortness of breath  Near baseline. refill  - albuterol sulfate  (90 Base) MCG/ACT inhaler; Inhale 2 puffs into the lungs every 6 hours as needed for Wheezing  Dispense: 18 g; Refill: 3    4. Wheezing  See above. - albuterol sulfate  (90 Base) MCG/ACT inhaler; Inhale 2 puffs into the lungs every 6 hours as needed for Wheezing  Dispense: 18 g; Refill: 3    5. Primary hypertension  The current medical regimen is effective;  continue present plan and medications. 6. Hyperlipidemia with target LDL less than 130  [place] labs at [facility]    7. Cirrhosis of liver without ascites, unspecified hepatic cirrhosis type ([facility]  Has not seen GI in some time. 8.  Copd  Diff w/ cost of trelegy. Working fairly well.   Samples given today

## 2022-05-06 ENCOUNTER — NURSE ONLY (OUTPATIENT)
Dept: FAMILY MEDICINE CLINIC | Age: 67
End: 2022-05-06
Payer: MEDICARE

## 2022-05-06 DIAGNOSIS — E11.9 TYPE 2 DIABETES MELLITUS WITHOUT COMPLICATION, WITHOUT LONG-TERM CURRENT USE OF INSULIN (HCC): ICD-10-CM

## 2022-05-06 LAB
A/G RATIO: 1.5 (ref 1.1–2.2)
ALBUMIN SERPL-MCNC: 4 G/DL (ref 3.4–5)
ALP BLD-CCNC: 136 U/L (ref 40–129)
ALT SERPL-CCNC: 15 U/L (ref 10–40)
ANION GAP SERPL CALCULATED.3IONS-SCNC: 17 MMOL/L (ref 3–16)
AST SERPL-CCNC: 18 U/L (ref 15–37)
BILIRUB SERPL-MCNC: 0.4 MG/DL (ref 0–1)
BUN BLDV-MCNC: 8 MG/DL (ref 7–20)
CALCIUM SERPL-MCNC: 9.4 MG/DL (ref 8.3–10.6)
CHLORIDE BLD-SCNC: 101 MMOL/L (ref 99–110)
CHOLESTEROL, TOTAL: 147 MG/DL (ref 0–199)
CO2: 25 MMOL/L (ref 21–32)
CREAT SERPL-MCNC: 0.5 MG/DL (ref 0.6–1.2)
GFR AFRICAN AMERICAN: >60
GFR NON-AFRICAN AMERICAN: >60
GLUCOSE BLD-MCNC: 172 MG/DL (ref 70–99)
HDLC SERPL-MCNC: 51 MG/DL (ref 40–60)
LDL CHOLESTEROL CALCULATED: 70 MG/DL
POTASSIUM SERPL-SCNC: 4.3 MMOL/L (ref 3.5–5.1)
SODIUM BLD-SCNC: 143 MMOL/L (ref 136–145)
TOTAL PROTEIN: 6.6 G/DL (ref 6.4–8.2)
TRIGL SERPL-MCNC: 128 MG/DL (ref 0–150)
VLDLC SERPL CALC-MCNC: 26 MG/DL

## 2022-05-06 PROCEDURE — 36415 COLL VENOUS BLD VENIPUNCTURE: CPT | Performed by: FAMILY MEDICINE

## 2022-05-06 NOTE — PROGRESS NOTES
Blood drawn per order. Needle size: 23 g  Site: L Antecubital.  First attempt successful No    Second attempt no  Third attempt yes    Pressure applied until bleeding stopped. Pressure applied. Patient informed to call office or return if bleeding reoccurs and unable to stop.     Tubes drawn: 1 purple     1 red

## 2022-05-07 LAB
ESTIMATED AVERAGE GLUCOSE: 159.9 MG/DL
HBA1C MFR BLD: 7.2 %

## 2022-05-09 NOTE — RESULT ENCOUNTER NOTE
Hemoglobin A1c a little higher than last check, but not bad. Continue medication. Continue to work on diet and activity. CMP okay except glucose. Alkaline phosphatase improved from last check. Lipids improved from last check as well.   No other changes at this time

## 2022-06-24 DIAGNOSIS — F41.0 PANIC DISORDER: ICD-10-CM

## 2022-06-24 RX ORDER — CLONAZEPAM 0.5 MG/1
TABLET ORAL
Qty: 60 TABLET | Refills: 2 | Status: SHIPPED | OUTPATIENT
Start: 2022-06-24 | End: 2022-10-17

## 2022-06-24 NOTE — TELEPHONE ENCOUNTER
Date of last refill of this med was 11/12/21 # of pills given 60 and # of refills given 2. Their next appointment is None, the last date patient was seen was 5/5/22 . Does patient have medication agreement on file? No  Has drug screen been done in last 12 months if needed?  N/A

## 2022-06-28 ENCOUNTER — TELEPHONE (OUTPATIENT)
Dept: PULMONOLOGY | Age: 67
End: 2022-06-28

## 2022-06-28 NOTE — TELEPHONE ENCOUNTER
Patient did not show for 6 mo fu appointment  with Dr. Mohinder Espinoza on 6/28/22    Same Day Cancellation: No    Patient rescheduled:  No    New appointment:     Patient was also no show on: na    LOV    ASSESSMENT:12/27/21  · COPD/pulmonary emphysema  · Pulmonary nodules, 2 to 3 mm on LDCT  · Shortness of breath   · Greater than 100-pack-year tobacco history, has cut down from 3 pack down to 2 ppd     PLAN:  · Trelegy - give sample and patient assistance information again today   · Low dose chest CT for lung cancer screening in Feb 2022 should be cancelled. Next LDCT would be due in November 2022. Will schedule at next visit. · Tobacco cessation is imperative. She declined Wellbutrin due to multiple psychiatric medications. Chantix was not covered by insurance. · See me in about 6 months                          Instructions     AVS (Printed 12/27/2021)      Additional Documentation    Vitals:  /84     Pulse 78     Resp 18     Ht 5' 5\" (1.651 m)     Wt 245 lb (111.1 kg)     SpO2 94%      BMI 40.77 kg/m²     BSA 2.26 m²            More Vitals     Flowsheets:  Calorie Assessment,     Vitals Reassessment        Encounter Info:  Billing Info,     Allergies,     Detailed Report,     History,     Medications,     Questionnaires          Chart Review Routing History    No routing history on file.     Encounter Status    Signed by Stacey Pruitt MD on 12/27/21 at 17:09    BestPractice Advisories    Click to view BestPractice Advisory history     Encounter Messages    No messages in this encounter       Orders Placed     None      Outpatient Medications at End of Encounter as of 12/27/2021    Fluticasone-Umeclidin-Vilant (TRELEGY ELLIPTA) 200-62.5-25 MCG/INH AEPB (Taking) Inhale 1 puff into the lungs daily   fluticasone-umeclidin-vilant (TRELEGY ELLIPTA) 100-62.5-25 MCG/INH AEPB (Taking) Inhale 1 puff into the lungs daily   linagliptin (TRADJENTA) 5 MG tablet (Taking) Take 1 tablet by mouth daily   citalopram (CELEXA) 40 MG tablet (Taking) TAKE 1 TABLET BY MOUTH NIGHTLY   metFORMIN (GLUCOPHAGE-XR) 500 MG extended release tablet (Taking) TAKE 1 TABLET BY MOUTH 2 TIMES DAILY   albuterol sulfate  (90 Base) MCG/ACT inhaler (Taking) INHALE 2 PUFFS INTO THE LUNGS EVERY 6 HOURS AS NEEDED FOR WHEEZING   fluticasone-umeclidin-vilant (TRELEGY ELLIPTA) 100-62.5-25 MCG/INH AEPB (Taking) Inhale 1 puff into the lungs daily   atorvastatin (LIPITOR) 20 MG tablet (Taking) TAKE 1 TABLET NIGHTLY   SITagliptin (JANUVIA) 100 MG tablet (Taking) Take 1 tablet by mouth daily   furosemide (LASIX) 20 MG tablet (Taking) TAKE (1) TABLET DAILY   mirtazapine (REMERON) 15 MG tablet (Taking) TAKE 1 TABLET BY MOUTH NIGHTLY   potassium chloride (KLOR-CON M) 10 MEQ extended release tablet (Taking) Take 2 tablets by mouth daily   Multiple Vitamins-Minerals (THERAPEUTIC MULTIVITAMIN-MINERALS) tablet (Taking) Take 1 tablet by mouth daily. clonazePAM (KLONOPIN) 0.5 MG tablet TAKE 1 TABLET TWICE DAILY AS NEEDED FOR ANXIETY FOR UP TO 30 DAYS   gabapentin (NEURONTIN) 100 MG capsule TAKE 1 CAPSULE DAILY AS NEEDED (FOOT NUMBNESS AND TINGLING) FOR UP TO 30 DAYS.      Medication Changes         Fluticasone-Umeclidin-Vilant 200-62.5-25 MCG/INH 1 puff Inhalation DAILY       Medication List     Visit Diagnoses         Chronic obstructive pulmonary disease, unspecified COPD type ([facility]       Problem List

## 2022-07-12 ENCOUNTER — OFFICE VISIT (OUTPATIENT)
Dept: PULMONOLOGY | Age: 67
End: 2022-07-12
Payer: MEDICARE

## 2022-07-12 VITALS
HEIGHT: 65 IN | HEART RATE: 86 BPM | OXYGEN SATURATION: 94 % | BODY MASS INDEX: 41.48 KG/M2 | WEIGHT: 249 LBS | DIASTOLIC BLOOD PRESSURE: 82 MMHG | SYSTOLIC BLOOD PRESSURE: 127 MMHG

## 2022-07-12 DIAGNOSIS — R91.1 PULMONARY NODULE: ICD-10-CM

## 2022-07-12 DIAGNOSIS — Z87.891 PERSONAL HISTORY OF TOBACCO USE: ICD-10-CM

## 2022-07-12 DIAGNOSIS — J44.9 CHRONIC OBSTRUCTIVE PULMONARY DISEASE, UNSPECIFIED COPD TYPE (HCC): Primary | ICD-10-CM

## 2022-07-12 PROCEDURE — 99214 OFFICE O/P EST MOD 30 MIN: CPT | Performed by: INTERNAL MEDICINE

## 2022-07-12 PROCEDURE — 1123F ACP DISCUSS/DSCN MKR DOCD: CPT | Performed by: INTERNAL MEDICINE

## 2022-07-12 RX ORDER — FLUTICASONE FUROATE, UMECLIDINIUM BROMIDE AND VILANTEROL TRIFENATATE 100; 62.5; 25 UG/1; UG/1; UG/1
1 POWDER RESPIRATORY (INHALATION) DAILY
Qty: 2 EACH | Refills: 0 | COMMUNITY
Start: 2022-07-12 | End: 2022-09-29 | Stop reason: SDUPTHER

## 2022-07-12 RX ORDER — ASPIRIN 325 MG
325 TABLET ORAL DAILY
COMMUNITY

## 2022-07-12 NOTE — PROGRESS NOTES
PULMONARY    CC: Shortness of breath & wheezing    Interval History:   Still trouble affording trelegy, using sample from PCP. Occasional chest tightness/ R &  L anteriorly/ better with albuterol. Still smoking, trying to quit    Presenting HPI: Taye Larkin is a 74 yo with a past medical history liver cirrhosis and emphysema who presents today with a history of increasing severe shortness of breath worse with exertion, associated with chest tightness, treated with Trelegy with improvement. reports that she has been smoking cigarettes. She has a 90.00 pack-year smoking history. She has never used smokeless tobacco.      PHYSICAL EXAM:  Blood pressure 127/82, pulse 86, height 5' 5\" (1.651 m), weight 249 lb (112.9 kg), SpO2 94 %, not currently breastfeeding.'  Constitutional:  No acute distress. HENT:  Oropharynx is clear and moist.   Neck: No tracheal deviation present. Cardiovascular: Normal heart sounds. + lower extremity edema. Pulmonary/Chest: No wheezes. No rhonchi. No rales. + decreased breath sounds. No accessory muscle usage or stridor. Musculoskeletal: No cyanosis. No clubbing. Skin: Skin is warm and dry. Psychiatric: Normal mood and affect. Neurologic: speech fluent, alert and oriented, strength symmetric      DATA:  CTPA 11/8/21  Pulmonary Arteries: Pulmonary arteries are adequately opacified for   evaluation.  No evidence of intraluminal filling defect to suggest pulmonary   embolism.  Main pulmonary artery is normal in caliber.       Mediastinum: No evidence of mediastinal lymphadenopathy.  The heart and   pericardium demonstrate no acute abnormality.  There is no acute abnormality   of the thoracic aorta.       Lungs/pleura:  The lungs are without acute process.  No focal consolidation or   pulmonary edema.  No evidence of pleural effusion or pneumothorax.       Upper Abdomen: Limited images of the upper abdomen are unremarkable.       Soft Tissues/Bones: No acute bone or soft tissue abnormality. PFT 12/23/21 FEV1 1.68 L 67% TLC 4.59 L 86%  DLCO 12.69 54%     ASSESSMENT:  · COPD/pulmonary emphysema  · Pulmonary nodules, 2 to 3 mm on LDCT  · Greater than 100-pack-year tobacco history, has cut down from 3 pack down to 2 ppd    PLAN:  · Trelegy - will give sample today; she is checking options with her insurer   · LDCT for LCS in November 2022. · Tobacco cessation is imperative. She declined Wellbutrin due to multiple psychiatric medications. Chantix was not covered by insurance.   · See me in November after CT imaging

## 2022-07-12 NOTE — PROGRESS NOTES
Low Dose CT (LDCT) Lung Screening criteria met:     Age 50-77(Medicare) or 50-80 ([facility])   Pack year smoking >20   Still smoking or less than 15 year since quit   No sign or symptoms of lung cancer   > 11 months since last LDCT     Risks and benefits of lung cancer screening with LDCT scans discussed:    Significance of positive screen - False-positive LDCT results often occur. 95% of all positive results do not lead to a diagnosis of cancer. Usually further imaging can resolve most false-positive results; however, some patients may require invasive procedures. Over diagnosis risk - 10% to 12% of screen-detected lung cancer cases are over diagnosed--that is, the cancer would not have been detected in the patient's lifetime without the screening. Need for follow up screens annually to continue lung cancer screening effectiveness     Risks associated with radiation from annual LDCT- Radiation exposure is about the same as for a mammogram, which is about 1/3 of the annual background radiation exposure from everyday life. Starting screening at age 54 is not likely to increase cancer risk from radiation exposure. Patients with comorbidities resulting in life expectancy of < 10 years, or that would preclude treatment of an abnormality identified on CT, should not be screened due to lack of benefit.     To obtain maximal benefit from this screening, smoking cessation and long-term abstinence from smoking is critical

## 2022-07-19 DIAGNOSIS — G47.9 SLEEP DISTURBANCE: ICD-10-CM

## 2022-07-19 RX ORDER — MIRTAZAPINE 15 MG/1
TABLET, FILM COATED ORAL
Qty: 90 TABLET | Refills: 3 | Status: SHIPPED | OUTPATIENT
Start: 2022-07-19

## 2022-08-16 DIAGNOSIS — E11.9 TYPE 2 DIABETES MELLITUS WITHOUT COMPLICATION, WITHOUT LONG-TERM CURRENT USE OF INSULIN (HCC): ICD-10-CM

## 2022-08-16 DIAGNOSIS — R20.8 BURNING SENSATION OF FEET: ICD-10-CM

## 2022-08-16 DIAGNOSIS — M51.36 DDD (DEGENERATIVE DISC DISEASE), LUMBAR: ICD-10-CM

## 2022-08-16 DIAGNOSIS — R60.9 DEPENDENT EDEMA: ICD-10-CM

## 2022-08-16 RX ORDER — FUROSEMIDE 20 MG/1
TABLET ORAL
Qty: 30 TABLET | Refills: 11 | Status: SHIPPED | OUTPATIENT
Start: 2022-08-16

## 2022-08-16 RX ORDER — GABAPENTIN 100 MG/1
100 CAPSULE ORAL 3 TIMES DAILY
Qty: 90 CAPSULE | Refills: 2 | Status: SHIPPED | OUTPATIENT
Start: 2022-08-16 | End: 2022-09-29

## 2022-09-19 DIAGNOSIS — F32.89 OTHER DEPRESSION: ICD-10-CM

## 2022-09-19 RX ORDER — BUPROPION HYDROCHLORIDE 150 MG/1
150 TABLET ORAL EVERY MORNING
Qty: 30 TABLET | Refills: 3 | Status: SHIPPED | OUTPATIENT
Start: 2022-09-19

## 2022-09-29 ENCOUNTER — OFFICE VISIT (OUTPATIENT)
Dept: FAMILY MEDICINE CLINIC | Age: 67
End: 2022-09-29
Payer: MEDICARE

## 2022-09-29 VITALS
OXYGEN SATURATION: 95 % | BODY MASS INDEX: 42.05 KG/M2 | WEIGHT: 252.4 LBS | SYSTOLIC BLOOD PRESSURE: 110 MMHG | HEIGHT: 65 IN | HEART RATE: 82 BPM | DIASTOLIC BLOOD PRESSURE: 72 MMHG

## 2022-09-29 DIAGNOSIS — R60.0 SWELLING OF LEFT PAROTID GLAND: ICD-10-CM

## 2022-09-29 DIAGNOSIS — J44.9 CHRONIC OBSTRUCTIVE PULMONARY DISEASE, UNSPECIFIED COPD TYPE (HCC): ICD-10-CM

## 2022-09-29 DIAGNOSIS — E11.9 TYPE 2 DIABETES MELLITUS WITHOUT COMPLICATION, WITHOUT LONG-TERM CURRENT USE OF INSULIN (HCC): ICD-10-CM

## 2022-09-29 DIAGNOSIS — R10.32 LLQ ABDOMINAL PAIN: Primary | ICD-10-CM

## 2022-09-29 PROCEDURE — 3051F HG A1C>EQUAL 7.0%<8.0%: CPT | Performed by: FAMILY MEDICINE

## 2022-09-29 PROCEDURE — 1123F ACP DISCUSS/DSCN MKR DOCD: CPT | Performed by: FAMILY MEDICINE

## 2022-09-29 PROCEDURE — 99214 OFFICE O/P EST MOD 30 MIN: CPT | Performed by: FAMILY MEDICINE

## 2022-09-29 RX ORDER — AMOXICILLIN AND CLAVULANATE POTASSIUM 875; 125 MG/1; MG/1
1 TABLET, FILM COATED ORAL 2 TIMES DAILY
Qty: 20 TABLET | Refills: 0 | Status: SHIPPED | OUTPATIENT
Start: 2022-09-29 | End: 2022-10-09

## 2022-09-29 RX ORDER — FLUTICASONE FUROATE, UMECLIDINIUM BROMIDE AND VILANTEROL TRIFENATATE 200; 62.5; 25 UG/1; UG/1; UG/1
1 POWDER RESPIRATORY (INHALATION) DAILY
Qty: 3 EACH | Refills: 3 | Status: SHIPPED | OUTPATIENT
Start: 2022-09-29

## 2022-09-29 ASSESSMENT — ENCOUNTER SYMPTOMS
DIARRHEA: 0
NAUSEA: 0
ABDOMINAL PAIN: 1
VOMITING: 0
CONSTIPATION: 0

## 2022-09-29 NOTE — PROGRESS NOTES
Chief Complaint   Patient presents with    Abdominal Pain     Left upper quadrant abdominal pain    Facial Swelling     Left side jaw swelling       HPI:  Tyson Bower is a 78 y.o. (: 1955) here today   for left upper quadrant abdominal pain. Patient is also having swelling in her left jaw area. Abdominal Pain  This is a chronic problem. The current episode started more than 1 year ago. The problem has been waxing and waning. The pain is located in the LUQ. The quality of the pain is sharp. The abdominal pain radiates to the LUQ. Pertinent negatives include no constipation, diarrhea, fever, nausea or vomiting. Exacerbated by: certain foods. The pain is relieved by Nothing. She has tried antacids for the symptoms. The treatment provided no relief. Some days worse than others. Aggravated by pork, spicy foods. Tums did not help. Has tried probiotics w/o relief. Prev seen by GI. Sometimes worse than others. Left jaw. Down to neck on the left as well. Recurrent symptoms noted. Known history of COPD. Next CT planned per pulmonology in approximately 6 weeks. Symptoms near baseline. She has been out of Trelegy. Desires samples. Patient's medications, allergies, past medical, surgical, social and family histories were reviewed and updated as appropriate. ROS:  Review of Systems   Constitutional:  Negative for fever. Gastrointestinal:  Positive for abdominal pain. Negative for constipation, diarrhea, nausea and vomiting.          Hemoglobin A1C (%)   Date Value   2022 7.2     Microscopic Examination (no units)   Date Value   2020 YES     Microalbumin, Random Urine (mg/dL)   Date Value   2021 <1.20     LDL Calculated (mg/dL)   Date Value   2022 70       Past Medical History:   Diagnosis Date    Anxiety     Arthritis     Depression     Disc degeneration     Frequent UTI     Hyperlipidemia     Hypertension     Liver cirrhosis ([facility] [address] 2019    Type 2 diabetes mellitus without complication, without long-term current use of insulin ([facility] [address]        Family History   Problem Relation Age of Onset    Diabetes Father     Cancer Father         colon    Cancer Sister         anal cancer, kidney cancer    High Blood Pressure Sister     Cancer Brother         stomach (unsure)    Depression Sister     High Blood Pressure Sister        Social History     Socioeconomic History    Marital status:      Spouse name: Not on file    Number of children: 4    Years of education: Not on file    Highest education level: Not on file   Occupational History    Not on file   Tobacco Use    Smoking status: Every Day     Packs/day: 2.00     Years: 45.00     Pack years: 90.00     Types: Cigarettes    Smokeless tobacco: Never   Vaping Use    Vaping Use: Never used   Substance and Sexual Activity    Alcohol use: No     Alcohol/week: 0.0 standard drinks     Comment: rare beer maybe 1-2 year     Drug use: No    Sexual activity: Yes     Partners: Male   Other Topics Concern    Not on file   Social History Narrative    11/2014 lives with spouse and grandson;home//raising grandchildren;     Social Determinants of Health     Financial Resource Strain: Not on file   Food Insecurity: Not on file   Transportation Needs: Not on file   Physical Activity: Not on file   Stress: Not on file   Social Connections: Not on file   Intimate Partner Violence: Not on file   Housing Stability: Not on file       Prior to Visit Medications    Medication Sig Taking?  Authorizing Provider   Fluticasone-Umeclidin-Vilant (TRELEGY ELLIPTA) 200-62.5-25 MCG/INH AEPB Inhale 1 puff into the lungs daily Yes Opal Collins MD   amoxicillin-clavulanate (AUGMENTIN) 875-125 MG per tablet Take 1 tablet by mouth 2 times daily for 10 days Yes Opal Collins MD   buPROPion (WELLBUTRIN XL) 150 MG extended release tablet TAKE 1 TABLET BY MOUTH EVERY MORNING Yes Paris Pain, APRN - CNP   furosemide (LASIX) 20 MG tablet TAKE (1) TABLET DAILY Yes FARHAT Ya CNP   gabapentin (NEURONTIN) 100 MG capsule TAKE 1 CAPSULE BY MOUTH 3 TIMES DAILY FOR 30 DAYS. Yes Ray Rueda MD   mirtazapine (REMERON) 15 MG tablet TAKE 1 TABLET BY MOUTH NIGHTLY Yes FARHAT Ya CNP   aspirin 325 MG tablet Take 325 mg by mouth daily Yes Historical Provider, MD   clonazePAM (KLONOPIN) 0.5 MG tablet TAKE 1 TABLET TWICE DAILY AS NEEDED FOR ANXIETY FOR UP TO 30 DAYS Yes Ray Rueda MD   albuterol sulfate  (90 Base) MCG/ACT inhaler Inhale 2 puffs into the lungs every 6 hours as needed for Wheezing Yes Ray Rueda MD   metFORMIN (GLUCOPHAGE-XR) 500 MG extended release tablet Take 1 tablet by mouth 2 times daily Yes Ray Rueda MD   atorvastatin (LIPITOR) 20 MG tablet TAKE 1 TABLET NIGHTLY Yes Ray Rueda MD   citalopram (CELEXA) 40 MG tablet TAKE 1 TABLET BY MOUTH NIGHTLY Yes Ray Rueda MD   Multiple Vitamins-Minerals (THERAPEUTIC MULTIVITAMIN-MINERALS) tablet Take 1 tablet by mouth daily. Yes Historical Provider, MD       Allergies   Allergen Reactions    Lisinopril      Angioedema 1/27/2017    Trazodone And Nefazodone Other (See Comments)     nightmares    Zithromax [Azithromycin Dihydrate] Nausea And Vomiting       OBJECTIVE:    /72   Pulse 82   Ht 5' 5\" (1.651 m)   Wt 252 lb 6.4 oz (114.5 kg)   SpO2 95%   BMI 42.00 kg/m²     BP Readings from Last 2 Encounters:   09/29/22 110/72   07/12/22 127/82       Wt Readings from Last 3 Encounters:   09/29/22 252 lb 6.4 oz (114.5 kg)   07/12/22 249 lb (112.9 kg)   05/05/22 245 lb 9.6 oz (111.4 kg)       Physical Exam  Constitutional:       Appearance: Normal appearance. HENT:      Head:        Left Ear: Tympanic membrane is perforated. Eyes:      Extraocular Movements: Extraocular movements intact. Cardiovascular:      Rate and Rhythm: Normal rate and regular rhythm. Pulmonary:      Effort: Pulmonary effort is normal.      Breath sounds: Normal breath sounds.

## 2022-10-17 DIAGNOSIS — F41.0 PANIC DISORDER: ICD-10-CM

## 2022-10-17 RX ORDER — CLONAZEPAM 0.5 MG/1
TABLET ORAL
Qty: 60 TABLET | Refills: 2 | Status: SHIPPED | OUTPATIENT
Start: 2022-10-17 | End: 2022-11-16

## 2022-10-17 NOTE — TELEPHONE ENCOUNTER
Date of last refill of this med was 6/24/2022, # of pills given 60 and # of refills given 2. Their next appointment is 0, the last date patient was seen was 9/29/2022. Does patient have medication agreement on file? No  Has drug screen been done in last 12 months if needed?  no

## 2022-11-02 ENCOUNTER — TELEMEDICINE (OUTPATIENT)
Dept: FAMILY MEDICINE CLINIC | Age: 67
End: 2022-11-02
Payer: MEDICARE

## 2022-11-02 DIAGNOSIS — Z00.00 MEDICARE ANNUAL WELLNESS VISIT, SUBSEQUENT: Primary | ICD-10-CM

## 2022-11-02 PROCEDURE — 1123F ACP DISCUSS/DSCN MKR DOCD: CPT | Performed by: FAMILY MEDICINE

## 2022-11-02 PROCEDURE — G0439 PPPS, SUBSEQ VISIT: HCPCS | Performed by: FAMILY MEDICINE

## 2022-11-02 SDOH — ECONOMIC STABILITY: FOOD INSECURITY: WITHIN THE PAST 12 MONTHS, THE FOOD YOU BOUGHT JUST DIDN'T LAST AND YOU DIDN'T HAVE MONEY TO GET MORE.: NEVER TRUE

## 2022-11-02 SDOH — ECONOMIC STABILITY: FOOD INSECURITY: WITHIN THE PAST 12 MONTHS, YOU WORRIED THAT YOUR FOOD WOULD RUN OUT BEFORE YOU GOT MONEY TO BUY MORE.: NEVER TRUE

## 2022-11-02 ASSESSMENT — PATIENT HEALTH QUESTIONNAIRE - PHQ9
10. IF YOU CHECKED OFF ANY PROBLEMS, HOW DIFFICULT HAVE THESE PROBLEMS MADE IT FOR YOU TO DO YOUR WORK, TAKE CARE OF THINGS AT HOME, OR GET ALONG WITH OTHER PEOPLE: 0
1. LITTLE INTEREST OR PLEASURE IN DOING THINGS: 0
3. TROUBLE FALLING OR STAYING ASLEEP: 2
SUM OF ALL RESPONSES TO PHQ QUESTIONS 1-9: 8
8. MOVING OR SPEAKING SO SLOWLY THAT OTHER PEOPLE COULD HAVE NOTICED. OR THE OPPOSITE, BEING SO FIGETY OR RESTLESS THAT YOU HAVE BEEN MOVING AROUND A LOT MORE THAN USUAL: 0
SUM OF ALL RESPONSES TO PHQ QUESTIONS 1-9: 8
6. FEELING BAD ABOUT YOURSELF - OR THAT YOU ARE A FAILURE OR HAVE LET YOURSELF OR YOUR FAMILY DOWN: 0
2. FEELING DOWN, DEPRESSED OR HOPELESS: 1
7. TROUBLE CONCENTRATING ON THINGS, SUCH AS READING THE NEWSPAPER OR WATCHING TELEVISION: 0
9. THOUGHTS THAT YOU WOULD BE BETTER OFF DEAD, OR OF HURTING YOURSELF: 0
5. POOR APPETITE OR OVEREATING: 2
SUM OF ALL RESPONSES TO PHQ QUESTIONS 1-9: 8
SUM OF ALL RESPONSES TO PHQ QUESTIONS 1-9: 8
SUM OF ALL RESPONSES TO PHQ9 QUESTIONS 1 & 2: 1
4. FEELING TIRED OR HAVING LITTLE ENERGY: 3

## 2022-11-02 ASSESSMENT — LIFESTYLE VARIABLES
HOW OFTEN DO YOU HAVE A DRINK CONTAINING ALCOHOL: NEVER
HOW MANY STANDARD DRINKS CONTAINING ALCOHOL DO YOU HAVE ON A TYPICAL DAY: PATIENT DOES NOT DRINK

## 2022-11-02 ASSESSMENT — SOCIAL DETERMINANTS OF HEALTH (SDOH): HOW HARD IS IT FOR YOU TO PAY FOR THE VERY BASICS LIKE FOOD, HOUSING, MEDICAL CARE, AND HEATING?: NOT HARD AT ALL

## 2022-11-02 NOTE — PATIENT INSTRUCTIONS
Personalized Preventive Plan for Gaby Reyes - 11/2/2022  Medicare offers a range of preventive health benefits. Some of the tests and screenings are paid in full while other may be subject to a deductible, co-insurance, and/or copay. Some of these benefits include a comprehensive review of your medical history including lifestyle, illnesses that may run in your family, and various assessments and screenings as appropriate. After reviewing your medical record and screening and assessments performed today your provider may have ordered immunizations, labs, imaging, and/or referrals for you. A list of these orders (if applicable) as well as your Preventive Care list are included within your After Visit Summary for your review. Other Preventive Recommendations:    A preventive eye exam performed by an eye specialist is recommended every 1-2 years to screen for glaucoma; cataracts, macular degeneration, and other eye disorders. A preventive dental visit is recommended every 6 months. Try to get at least 150 minutes of exercise per week or 10,000 steps per day on a pedometer . Order or download the FREE \"Exercise & Physical Activity: Your Everyday Guide\" from [employer]. Call 0-489.501.4593 or search [employer] online. You need 3325-6608 mg of calcium and 4218-5716 IU of vitamin D per day. It is possible to meet your calcium requirement with diet alone, but a vitamin D supplement is usually necessary to meet this goal.  When exposed to the sun, use a sunscreen that protects against both UVA and UVB radiation with an SPF of 30 or greater. Reapply every 2 to 3 hours or after sweating, drying off with a towel, or swimming. Always wear a seat belt when traveling in a car. Always wear a helmet when riding a bicycle or motorcycle.

## 2022-11-02 NOTE — PROGRESS NOTES
Medicare Annual Wellness Visit    Suzanne Lind is here for Medicare AWV    Assessment & Plan   Medicare annual wellness visit, subsequent    Recommendations for Preventive Services Due: see orders and patient instructions/AVS.  Recommended screening schedule for the next 5-10 years is provided to the patient in written form: see Patient Instructions/AVS.     No follow-ups on file. Subjective       Patient's complete Health Risk Assessment and screening values have been reviewed and are found in Flowsheets. The following problems were reviewed today and where indicated follow up appointments were made and/or referrals ordered.     Positive Risk Factor Screenings with Interventions:    Fall Risk:  Do you feel unsteady or are you worried about falling? : (!) yes  2 or more falls in past year?: no  Fall with injury in past year?: no      Depression:  PHQ-2 Score: 1  PHQ-9 Total Score: 8    Severity:1-4 = minimal depression, 5-9 = mild depression, 10-14 = moderate depression, 15-19 = moderately severe depression, 20-27 = severe depression  Tobacco Use:  Tobacco Use: High Risk    Smoking Tobacco Use: Every Day    Smokeless Tobacco Use: Never    Passive Exposure: Not on file     E-cigarette/Vaping       Questions Responses    E-cigarette/Vaping Use Never User    Start Date     Passive Exposure     Quit Date     Counseling Given     Comments           Substance Use - Tobacco Interventions:  patient is not ready to work toward tobacco cessation at this time         General Health and ACP:  General  In general, how would you say your health is?: Good  In the past 7 days, have you experienced any of the following: New or Increased Pain, New or Increased Fatigue, Loneliness, Social Isolation, Stress or Anger?: No  Do you get the social and emotional support that you need?: Yes  Do you have a Living Will?: (!) No    Advance Directives       Power of  Living Will ACP-Advance Directive ACP-Power of     Not on File Not on File Filed Not on File          Health Habits/Nutrition:  Physical Activity: Inactive    Days of Exercise per Week: 0 days    Minutes of Exercise per Session: 0 min     Have you lost any weight without trying in the past 3 months?: No     Have you seen the dentist within the past year?: N/A - wear dentures  Health Habits/Nutrition Interventions:  No interventions at this time     Safety:  Do you have working smoke detectors?: Yes  Do you have any tripping hazards - loose or unsecured carpets or rugs?: No  Do you have any tripping hazards - clutter in doorways, halls, or stairs?: No  Do you have either shower bars, grab bars, non-slip mats or non-slip surfaces in your shower or bathtub?: (!) No  Do all of your stairways have a railing or banister?: Not Applicable  Do you always fasten your seatbelt when you are in a car?: (!) No         Objective      Patient-Reported Vitals  Patient-Reported Systolic (Top): 589 mmHg  Patient-Reported Diastolic (Bottom): 76 mmHg  Patient-Reported Pulse: 82  Patient-Reported Weight: 240 lbs            Allergies   Allergen Reactions    Lisinopril      Angioedema 1/27/2017    Trazodone And Nefazodone Other (See Comments)     nightmares    Zithromax [Azithromycin Dihydrate] Nausea And Vomiting     Prior to Visit Medications    Medication Sig Taking?  Authorizing Provider   clonazePAM (KLONOPIN) 0.5 MG tablet TAKE 1 TABLET TWICE DAILY AS NEEDED FOR ANXIETY FOR UP TO 30 DAYS Yes Maikel Diego MD   Fluticasone-Umeclidin-Vilant (TRELEGY ELLIPTA) 200-62.5-25 MCG/INH AEPB Inhale 1 puff into the lungs daily Yes Maikel Diego MD   buPROPion (WELLBUTRIN XL) 150 MG extended release tablet TAKE 1 TABLET BY MOUTH EVERY MORNING Yes FARHAT Pierre CNP   furosemide (LASIX) 20 MG tablet TAKE (1) TABLET DAILY Yes FARHAT Pierre CNP   mirtazapine (REMERON) 15 MG tablet TAKE 1 TABLET BY MOUTH NIGHTLY Yes FARHAT Pierre CNP   aspirin 325 MG tablet Take 325 mg by mouth daily Yes Historical Provider, MD   albuterol sulfate  (90 Base) MCG/ACT inhaler Inhale 2 puffs into the lungs every 6 hours as needed for Wheezing Yes Jamee English MD   atorvastatin (LIPITOR) 20 MG tablet TAKE 1 TABLET NIGHTLY Yes Jamee English MD   citalopram (CELEXA) 40 MG tablet TAKE 1 TABLET BY MOUTH NIGHTLY Yes Jamee English MD   Multiple Vitamins-Minerals (THERAPEUTIC MULTIVITAMIN-MINERALS) tablet Take 1 tablet by mouth daily. Yes Historical Provider, MD   gabapentin (NEURONTIN) 100 MG capsule TAKE 1 CAPSULE BY MOUTH 3 TIMES DAILY FOR 30 DAYS. Jamee English MD   metFORMIN (GLUCOPHAGE-XR) 500 MG extended release tablet Take 1 tablet by mouth 2 times daily  Jamee English MD       CareTe (Including outside providers/suppliers regularly involved in providing care):   Patient Care Team:  Jamee English MD as PCP - General (Family Medicine)  Jamee English MD as PCP - [facility]  Ej Avila MD as Consulting Physician (Gastroenterology)  Rebecca Love MD as Consulting Physician (Pulmonology)     Reviewed and updated this visit:  Tobacco  Allergies  Meds  Med Hx  Surg Hx  Soc Hx  Fam Hx          Eladio Almendarez, was evaluated through a synchronous (real-time) telephone encounter. The patient (or guardian if applicable) is aware that this is a billable service, which includes applicable co-pays. This Virtual Visit was conducted with patient's (and/or legal guardian's) consent. The visit was conducted pursuant to the emergency declaration under the [facility], [address] [place] [address] authority and the Accountable and [employer] General Act. Patient identification was verified, and a caregiver was present when appropriate. The patient was located at Home: [address] [place] [address]. Provider was located at [facility] ([facility]): [address] [facility],  [facility].      Lacie Krabbe, LPN, 11/2/2022, performed the documented evaluation under the direct supervision of the attending physician. This encounter was performed under my, Pam Vo, direct supervision, 11/2/2022.

## 2022-11-10 ENCOUNTER — HOSPITAL ENCOUNTER (OUTPATIENT)
Dept: CT IMAGING | Age: 67
Discharge: HOME | End: 2022-11-10
Payer: MEDICARE

## 2022-11-10 DIAGNOSIS — Z87.891 PERSONAL HISTORY OF TOBACCO USE: ICD-10-CM

## 2022-11-10 PROCEDURE — 71271 CT THORAX LUNG CANCER SCR C-: CPT

## 2022-11-15 ENCOUNTER — OFFICE VISIT (OUTPATIENT)
Dept: PULMONOLOGY | Age: 67
End: 2022-11-15
Payer: MEDICARE

## 2022-11-15 ENCOUNTER — TELEPHONE (OUTPATIENT)
Dept: PULMONOLOGY | Age: 67
End: 2022-11-15

## 2022-11-15 VITALS
RESPIRATION RATE: 18 BRPM | OXYGEN SATURATION: 96 % | WEIGHT: 258 LBS | HEIGHT: 66 IN | BODY MASS INDEX: 41.46 KG/M2 | SYSTOLIC BLOOD PRESSURE: 135 MMHG | HEART RATE: 78 BPM | DIASTOLIC BLOOD PRESSURE: 70 MMHG

## 2022-11-15 DIAGNOSIS — R91.1 PULMONARY NODULE: ICD-10-CM

## 2022-11-15 DIAGNOSIS — Z87.891 PERSONAL HISTORY OF TOBACCO USE: ICD-10-CM

## 2022-11-15 DIAGNOSIS — J44.9 CHRONIC OBSTRUCTIVE PULMONARY DISEASE, UNSPECIFIED COPD TYPE (HCC): Primary | ICD-10-CM

## 2022-11-15 PROCEDURE — 3074F SYST BP LT 130 MM HG: CPT | Performed by: INTERNAL MEDICINE

## 2022-11-15 PROCEDURE — 3078F DIAST BP <80 MM HG: CPT | Performed by: INTERNAL MEDICINE

## 2022-11-15 PROCEDURE — 1123F ACP DISCUSS/DSCN MKR DOCD: CPT | Performed by: INTERNAL MEDICINE

## 2022-11-15 PROCEDURE — 99214 OFFICE O/P EST MOD 30 MIN: CPT | Performed by: INTERNAL MEDICINE

## 2022-11-15 NOTE — PROGRESS NOTES
Outpatient Pulmonary Progress Note  CC: Shortness of breath & wheezing    Interval History 11/15/22:     - Had LDCT for LCS   - Asked & received Trelegy samples from PCP as well, says she is now getting trelegy    Interval History 7/12/22:  Still trouble affording trelegy, using sample from PCP. Occasional chest tightness/ R &  L anteriorly/ better with albuterol. Still smoking, trying to quit    Presenting HPI: Savannah Arzate is a 72 yo with a past medical history liver cirrhosis and emphysema who presents today with a history of increasing severe shortness of breath worse with exertion, associated with chest tightness, treated with Trelegy with improvement. reports that she has been smoking cigarettes. She has a 90.00 pack-year smoking history. She has never used smokeless tobacco.    PHYSICAL EXAM:  Blood pressure 135/70, pulse 78, resp. rate 18, height 5' 6\" (1.676 m), weight 258 lb (117 kg), SpO2 96 %, not currently breastfeeding.'  Constitutional:  No acute distress. HENT:  Oropharynx is clear and moist.   Neck: No tracheal deviation present. Cardiovascular: Normal heart sounds. + lower extremity edema. Pulmonary/Chest: No wheezes. No rhonchi. No rales. + decreased breath sounds. No accessory muscle usage or stridor. Musculoskeletal: No cyanosis. No clubbing. Skin: Skin is warm and dry. Psychiatric: Normal mood and affect. Neurologic: speech fluent, alert and oriented, strength symmetric      DATA:  PFT 12/23/21 FEV1 1.68 L 67% TLC 4.59 L 86%  DLCO 12.69 54%     LDCT for LCS 11/10/22, c/w CTPA 11/8/21  Mediastinum: Coronary artery calcifications are a marker of atherosclerosis. There are no enlarged thoracic lymph nodes. Calcified granulomatous disease   is noted. Lungs/pleura: The tracheobronchial tree is patent. There is no pneumothorax   or pleural effusion. Mild emphysema involves the bilateral upper lungs with   bibasilar scarring.        No change in the 3 mm solid right lower Wound care RN consult placed for right leg venous ulcer.  Called floor RN Kelsi and advised that this requires Wound Care MD consult instead.  This RN order cancelled.     lobe pulmonary nodule. No new   pulmonary nodules have developed. Upper Abdomen: The liver is diffusely low in attenuation consistent with   hepatic steatosis. Status post cholecystectomy. Impression   1. Unchanged 3 mm solid right pulmonary nodule. RECOMMENDATIONS:   Per ACR Lung-RADS Version 1.1   Category 2, Benign appearance or behavior. Management:  Continue annual lung   screening with LDCT in 12 months. (probability of malignancy <1%). ASSESSMENT:  COPD/pulmonary emphysema  Pulmonary nodules, 2 to 3 mm on LDCT  Greater than 100-pack-year tobacco history, has cut down from 3 pack down to 2 ppd    PLAN:  Trelegy daily  LDCT for LCS in November 2023; see me after   Tobacco cessation is imperative. She declined Wellbutrin due to multiple psychiatric medications. Chantix was not covered by insurance. See CMT in 6 months    Screening CT scan was considered in a lung cancer screening counseling and shared decision making visit today that included the following elements:   Eligibility: Age: 78.  There are no signs or symptoms of lung cancer. Tobacco History 90 pack-years, quit zero years ago  Verbal counseling has been performed by me to include benefits and harms of screening, follow-up diagnostic testing, over-diagnosis, false positive rate, and total radiation exposure;   I have counseled on the importance of adherence to annual lung cancer LDCT screening, the impact of comorbidities and patient is willing to undergo diagnosis and treatment;   I have provided counseling on the importance of maintaining cigarette smoking abstinence if former smoker; or the importance of smoking cessation if current smoker and, if appropriate, furnishing of information about tobacco cessation interventions; and   I have furnished a written order for lung cancer screening with LDCT. Order for Screening chest CT scan should be placed with documentation as below:  Beneficiary date of birth;    Actual pack - year smoking history (number) from above;   Current smoking status, and for former smokers, the number of years since quitting smoking from above  Beneficiary is asymptomatic   National Provider Identifier (NPI) for Yanira Guzman [id number]

## 2022-11-15 NOTE — TELEPHONE ENCOUNTER
Pt needs 6 month COPD follow up with [facility] per Dr. Billy Zaragoza. Schedule is not open yet.

## 2022-11-16 DIAGNOSIS — M51.36 DDD (DEGENERATIVE DISC DISEASE), LUMBAR: ICD-10-CM

## 2022-11-16 DIAGNOSIS — E11.9 TYPE 2 DIABETES MELLITUS WITHOUT COMPLICATION, WITHOUT LONG-TERM CURRENT USE OF INSULIN (HCC): ICD-10-CM

## 2022-11-16 DIAGNOSIS — R20.8 BURNING SENSATION OF FEET: ICD-10-CM

## 2022-11-16 DIAGNOSIS — F32.A DEPRESSION, UNSPECIFIED DEPRESSION TYPE: ICD-10-CM

## 2022-11-16 RX ORDER — GABAPENTIN 100 MG/1
100 CAPSULE ORAL 3 TIMES DAILY
Qty: 90 CAPSULE | Refills: 0 | Status: SHIPPED | OUTPATIENT
Start: 2022-11-16 | End: 2022-12-15

## 2022-11-17 RX ORDER — CITALOPRAM 40 MG/1
40 TABLET ORAL NIGHTLY
Qty: 90 TABLET | Refills: 3 | Status: SHIPPED | OUTPATIENT
Start: 2022-11-17

## 2022-12-15 DIAGNOSIS — E11.9 TYPE 2 DIABETES MELLITUS WITHOUT COMPLICATION, WITHOUT LONG-TERM CURRENT USE OF INSULIN (HCC): ICD-10-CM

## 2022-12-15 DIAGNOSIS — F41.0 PANIC DISORDER: ICD-10-CM

## 2022-12-15 DIAGNOSIS — R20.8 BURNING SENSATION OF FEET: ICD-10-CM

## 2022-12-15 DIAGNOSIS — M51.36 DDD (DEGENERATIVE DISC DISEASE), LUMBAR: ICD-10-CM

## 2022-12-15 DIAGNOSIS — I10 ESSENTIAL HYPERTENSION: ICD-10-CM

## 2022-12-15 DIAGNOSIS — F32.89 OTHER DEPRESSION: ICD-10-CM

## 2022-12-15 DIAGNOSIS — E78.5 HYPERLIPIDEMIA WITH TARGET LDL LESS THAN 130: ICD-10-CM

## 2022-12-15 RX ORDER — GABAPENTIN 100 MG/1
CAPSULE ORAL
Qty: 90 CAPSULE | Refills: 0 | Status: SHIPPED | OUTPATIENT
Start: 2022-12-15 | End: 2023-01-14

## 2022-12-15 RX ORDER — ATORVASTATIN CALCIUM 20 MG/1
TABLET, FILM COATED ORAL
Qty: 90 TABLET | Refills: 3 | Status: SHIPPED | OUTPATIENT
Start: 2022-12-15

## 2022-12-15 RX ORDER — CLONAZEPAM 0.5 MG/1
TABLET ORAL
Qty: 60 TABLET | Refills: 0 | Status: SHIPPED | OUTPATIENT
Start: 2022-12-15 | End: 2023-01-15

## 2022-12-15 RX ORDER — BUPROPION HYDROCHLORIDE 150 MG/1
TABLET ORAL
Qty: 30 TABLET | Refills: 3 | Status: SHIPPED | OUTPATIENT
Start: 2022-12-15

## 2023-01-11 NOTE — TELEPHONE ENCOUNTER
LM on home and cell to CB to sched 6 mo f/u with [facility] in May, per Dr. Fadumo Jackson.

## 2023-01-17 DIAGNOSIS — E11.9 TYPE 2 DIABETES MELLITUS WITHOUT COMPLICATION, WITHOUT LONG-TERM CURRENT USE OF INSULIN (HCC): ICD-10-CM

## 2023-01-17 DIAGNOSIS — R20.8 BURNING SENSATION OF FEET: ICD-10-CM

## 2023-01-17 DIAGNOSIS — M51.36 DDD (DEGENERATIVE DISC DISEASE), LUMBAR: ICD-10-CM

## 2023-01-17 RX ORDER — GABAPENTIN 100 MG/1
CAPSULE ORAL
Qty: 90 CAPSULE | Refills: 0 | Status: SHIPPED | OUTPATIENT
Start: 2023-01-17 | End: 2023-02-16

## 2023-01-17 NOTE — TELEPHONE ENCOUNTER
Date of last refill of this med was 12/15/2022, # of pills given 90 and # of refills given 0. Their next appointment is 0, the last date patient was seen was 9/29/2022. Does patient have medication agreement on file? No  Has drug screen been done in last 12 months if needed?  no

## 2023-02-16 ENCOUNTER — OFFICE VISIT (OUTPATIENT)
Dept: FAMILY MEDICINE CLINIC | Age: 68
End: 2023-02-16

## 2023-02-16 VITALS
HEART RATE: 90 BPM | HEIGHT: 66 IN | DIASTOLIC BLOOD PRESSURE: 86 MMHG | OXYGEN SATURATION: 95 % | BODY MASS INDEX: 42.27 KG/M2 | WEIGHT: 263 LBS | SYSTOLIC BLOOD PRESSURE: 136 MMHG

## 2023-02-16 DIAGNOSIS — R30.0 DYSURIA: Primary | ICD-10-CM

## 2023-02-16 DIAGNOSIS — M51.36 DDD (DEGENERATIVE DISC DISEASE), LUMBAR: ICD-10-CM

## 2023-02-16 DIAGNOSIS — R31.29 OTHER MICROSCOPIC HEMATURIA: ICD-10-CM

## 2023-02-16 DIAGNOSIS — K74.60 CIRRHOSIS OF LIVER WITHOUT ASCITES, UNSPECIFIED HEPATIC CIRRHOSIS TYPE (HCC): ICD-10-CM

## 2023-02-16 DIAGNOSIS — J44.9 CHRONIC OBSTRUCTIVE PULMONARY DISEASE, UNSPECIFIED COPD TYPE (HCC): ICD-10-CM

## 2023-02-16 DIAGNOSIS — E66.01 OBESITY, CLASS III, BMI 40-49.9 (MORBID OBESITY) (HCC): ICD-10-CM

## 2023-02-16 DIAGNOSIS — E11.9 TYPE 2 DIABETES MELLITUS WITHOUT COMPLICATION, WITHOUT LONG-TERM CURRENT USE OF INSULIN (HCC): ICD-10-CM

## 2023-02-16 DIAGNOSIS — R60.9 EDEMA, UNSPECIFIED TYPE: ICD-10-CM

## 2023-02-16 LAB
BILIRUBIN, POC: NEGATIVE
BLOOD URINE, POC: NORMAL
CLARITY, POC: CLEAR
COLOR, POC: YELLOW
GLUCOSE URINE, POC: NEGATIVE
KETONES, POC: NEGATIVE
LEUKOCYTE EST, POC: NEGATIVE
NITRITE, POC: NEGATIVE
PH, POC: 5.5
PROTEIN, POC: NEGATIVE
SPECIFIC GRAVITY, POC: 1.02
UROBILINOGEN, POC: 0.2

## 2023-02-16 RX ORDER — NITROFURANTOIN 25; 75 MG/1; MG/1
100 CAPSULE ORAL 2 TIMES DAILY
Qty: 20 CAPSULE | Refills: 0 | Status: SHIPPED | OUTPATIENT
Start: 2023-02-16 | End: 2023-02-26

## 2023-02-16 SDOH — ECONOMIC STABILITY: FOOD INSECURITY: WITHIN THE PAST 12 MONTHS, THE FOOD YOU BOUGHT JUST DIDN'T LAST AND YOU DIDN'T HAVE MONEY TO GET MORE.: NEVER TRUE

## 2023-02-16 SDOH — ECONOMIC STABILITY: FOOD INSECURITY: WITHIN THE PAST 12 MONTHS, YOU WORRIED THAT YOUR FOOD WOULD RUN OUT BEFORE YOU GOT MONEY TO BUY MORE.: NEVER TRUE

## 2023-02-16 SDOH — ECONOMIC STABILITY: HOUSING INSECURITY
IN THE LAST 12 MONTHS, WAS THERE A TIME WHEN YOU DID NOT HAVE A STEADY PLACE TO SLEEP OR SLEPT IN A SHELTER (INCLUDING NOW)?: NO

## 2023-02-16 SDOH — ECONOMIC STABILITY: INCOME INSECURITY: HOW HARD IS IT FOR YOU TO PAY FOR THE VERY BASICS LIKE FOOD, HOUSING, MEDICAL CARE, AND HEATING?: NOT HARD AT ALL

## 2023-02-16 NOTE — PROGRESS NOTES
Chief Complaint   Patient presents with    Urinary Burning    Back Pain       HPI:  Shirley Smith is a 80 y.o. (: 1955) here today   for urinary burning and back pain. Dysuria   This is a new problem. The current episode started 1 to 4 weeks ago (2 weeks). The problem occurs intermittently. The problem has been waxing and waning. The quality of the pain is described as burning. There has been no fever. She is Sexually active. There is No history of pyelonephritis. Associated symptoms include flank pain (left flank, different from her typical back pain), frequency, hesitancy (occas) and urgency. Pertinent negatives include no hematuria. She has tried nothing for the symptoms. Past due for f/u re dm, lipids, others. Has not had f/u w/ liver specialist. Breathing near baseline w/ trelegy. Does have some sob when lying down at night. Ongoing issues w/ sig back pain. Had seen pain mgmt in the past.  Did not anton sig amt of prior meds. Patient's medications, allergies, past medical, surgical, social and family histories were reviewed and updated as appropriate. ROS:  Review of Systems   Constitutional:  Negative for fever. Genitourinary:  Positive for dysuria, flank pain (left flank, different from her typical back pain), frequency, hesitancy (occas) and urgency. Negative for hematuria.          Hemoglobin A1C (%)   Date Value   2022 7.2     Microscopic Examination (no units)   Date Value   2020 YES     Microalbumin, Random Urine (mg/dL)   Date Value   2021 <1.20     LDL Calculated (mg/dL)   Date Value   2022 70       Past Medical History:   Diagnosis Date    Anxiety     Arthritis     Depression     Disc degeneration     Frequent UTI     Hyperlipidemia     Hypertension     Liver cirrhosis ([facility] [address] 2019    Type 2 diabetes mellitus without complication, without long-term current use of insulin (HCC)        Family History   Problem Relation Age of Onset    Diabetes Father     Cancer Father         colon    Cancer Sister         anal cancer, kidney cancer    High Blood Pressure Sister     Cancer Brother         stomach (unsure)    Depression Sister     High Blood Pressure Sister        Social History     Socioeconomic History    Marital status:      Spouse name: Not on file    Number of children: 4    Years of education: Not on file    Highest education level: Not on file   Occupational History    Not on file   Tobacco Use    Smoking status: Every Day     Packs/day: 2.00     Years: 45.00     Pack years: 90.00     Types: Cigarettes    Smokeless tobacco: Never   Vaping Use    Vaping Use: Never used   Substance and Sexual Activity    Alcohol use: No     Alcohol/week: 0.0 standard drinks     Comment: rare beer maybe 1-2 year     Drug use: No    Sexual activity: Yes     Partners: Male   Other Topics Concern    Not on file   Social History Narrative    11/2014 lives with spouse and grandson;home//raising grandchildren;     Social Determinants of Health     Financial Resource Strain: Low Risk     Difficulty of Paying Living Expenses: Not hard at all   Food Insecurity: No Food Insecurity    Worried About Running Out of Food in the Last Year: Never true    Ran Out of Food in the Last Year: Never true   Transportation Needs: Unknown    Lack of Transportation (Medical): Not on file    Lack of Transportation (Non-Medical): No   Physical Activity: Inactive    Days of Exercise per Week: 0 days    Minutes of Exercise per Session: 0 min   Stress: Not on file   Social Connections: Not on file   Intimate Partner Violence: Not on file   Housing Stability: Unknown    Unable to Pay for Housing in the Last Year: Not on file    Number of Places Lived in the Last Year: Not on file    Unstable Housing in the Last Year: No       Prior to Visit Medications    Medication Sig Taking?  Authorizing Provider   gabapentin (NEURONTIN) 100 MG capsule TAKE ONE (1) CAPSULE BY MOUTH THREE (3) TIMES DAILY FOR 30 DAYS. Yes Shahab Bhatt MD   atorvastatin (LIPITOR) 20 MG tablet TAKE ONE (1) TABLET NIGHTLY Yes Shahab Bhatt MD   clonazePAM (KLONOPIN) 0.5 MG tablet TAKE ONE (1) TABLET TWICE DAILY AS NEEDED FOR ANXIETY FOR UP TO 30 DAYS Yes Shahab Bhatt MD   buPROPion (WELLBUTRIN XL) 150 MG extended release tablet TAKE ONE (1) TABLET IN THE MORNING Yes Shahab Bhatt MD   citalopram (CELEXA) 40 MG tablet TAKE 1 TABLET BY MOUTH NIGHTLY Yes FARHAT Murrieta CNP   Fluticasone-Umeclidin-Vilant (TRELEGY ELLIPTA) 200-62.5-25 MCG/INH AEPB Inhale 1 puff into the lungs daily Yes Shahab Bhatt MD   furosemide (LASIX) 20 MG tablet TAKE (1) TABLET DAILY Yes FARHAT Murrieta CNP   mirtazapine (REMERON) 15 MG tablet TAKE 1 TABLET BY MOUTH NIGHTLY Yes FARHAT Murrieta CNP   aspirin 325 MG tablet Take 325 mg by mouth daily Yes Historical Provider, MD   albuterol sulfate  (90 Base) MCG/ACT inhaler Inhale 2 puffs into the lungs every 6 hours as needed for Wheezing Yes Shahab Bhatt MD   metFORMIN (GLUCOPHAGE-XR) 500 MG extended release tablet Take 1 tablet by mouth 2 times daily Yes Shahab Bhatt MD   Multiple Vitamins-Minerals (THERAPEUTIC MULTIVITAMIN-MINERALS) tablet Take 1 tablet by mouth daily. Yes Historical Provider, MD       Allergies   Allergen Reactions    Lisinopril      Angioedema 1/27/2017    Trazodone And Nefazodone Other (See Comments)     nightmares    Zithromax [Azithromycin Dihydrate] Nausea And Vomiting       OBJECTIVE:    /86   Pulse 90   Ht 5' 6\" (1.676 m)   Wt 263 lb (119.3 kg)   SpO2 95%   BMI 42.45 kg/m²     BP Readings from Last 2 Encounters:   02/16/23 136/86   11/15/22 135/70       Wt Readings from Last 3 Encounters:   02/16/23 263 lb (119.3 kg)   11/15/22 258 lb (117 kg)   09/29/22 252 lb 6.4 oz (114.5 kg)       Physical Exam  Constitutional:       Appearance: Normal appearance. HENT:      Head: Normocephalic and atraumatic.    Eyes:      Extraocular Movements: Extraocular movements intact. Cardiovascular:      Rate and Rhythm: Normal rate and regular rhythm. Pulmonary:      Effort: Pulmonary effort is normal.      Breath sounds: Decreased breath sounds present. Musculoskeletal:        Arms:       Right lower leg: Edema present. Left lower leg: Edema present. Neurological:      General: No focal deficit present. Mental Status: She is alert and oriented to person, place, and time. Psychiatric:         Mood and Affect: Mood normal.         Behavior: Behavior normal.         ASSESSMENT/PLAN:    1. Dysuria  Ua pos for tr blood. O/w neg. Given sxs, treat for possible uti. Send for cx. See below. - POCT Urinalysis no Micro    2. Cirrhosis of liver without ascites, unspecified hepatic cirrhosis type ([facility] [address]  Past due for rpt labs and f/u. Could be contrib to edema. See below. Arrange f/u appt     3. Obesity, Class III, BMI 40-49.9 (morbid obesity) ([facility] [address]  Cont to work on weight loss. 4. Chronic obstructive pulmonary disease, unspecified COPD type ([facility] [address]  The current medical regimen is effective;  continue present plan and medications. 5. Type 2 diabetes mellitus without complication, without long-term current use of insulin ([facility] [address]  Due for f/u appt. Arrange routine f/u and rpt labs. - Lipid Panel; Future  - Hemoglobin A1C; Future  - Comprehensive Metabolic Panel; Future    6. Edema, unspecified type  Inc lasix to 40mg m,w,f.  Rpt labs at upcoming appt. - TSH; Future  - T4, Free; Future    7. DDD (degenerative disc disease), lumbar  Referral to pain mgmt. - Amb External Referral To Pain Medicine    8. Hematuria  May be related to infxn. Rpt ua at next appt. Cannot exclude kidney stone, bladder issue, others. If still w/ blood and sxs, will need imaging, krista given smoking hx and risk for bladder cancer, others.

## 2023-02-17 LAB — URINE CULTURE, ROUTINE: NORMAL

## 2023-02-21 DIAGNOSIS — R20.8 BURNING SENSATION OF FEET: ICD-10-CM

## 2023-02-21 DIAGNOSIS — F41.0 PANIC DISORDER: ICD-10-CM

## 2023-02-21 DIAGNOSIS — M51.36 DDD (DEGENERATIVE DISC DISEASE), LUMBAR: ICD-10-CM

## 2023-02-21 DIAGNOSIS — E11.9 TYPE 2 DIABETES MELLITUS WITHOUT COMPLICATION, WITHOUT LONG-TERM CURRENT USE OF INSULIN (HCC): ICD-10-CM

## 2023-02-21 RX ORDER — CLONAZEPAM 0.5 MG/1
TABLET ORAL
Qty: 60 TABLET | Refills: 2 | Status: SHIPPED | OUTPATIENT
Start: 2023-02-21 | End: 2023-03-24

## 2023-02-21 RX ORDER — GABAPENTIN 100 MG/1
CAPSULE ORAL
Qty: 90 CAPSULE | Refills: 2 | Status: SHIPPED | OUTPATIENT
Start: 2023-02-21 | End: 2023-03-23

## 2023-02-22 DIAGNOSIS — E11.9 TYPE 2 DIABETES MELLITUS WITHOUT COMPLICATION, WITHOUT LONG-TERM CURRENT USE OF INSULIN (HCC): ICD-10-CM

## 2023-02-22 RX ORDER — METFORMIN HYDROCHLORIDE 500 MG/1
TABLET, EXTENDED RELEASE ORAL
Qty: 180 TABLET | Refills: 3 | Status: SHIPPED | OUTPATIENT
Start: 2023-02-22

## 2023-03-02 ENCOUNTER — OFFICE VISIT (OUTPATIENT)
Dept: FAMILY MEDICINE CLINIC | Age: 68
End: 2023-03-02
Payer: MEDICARE

## 2023-03-02 VITALS
BODY MASS INDEX: 41.98 KG/M2 | WEIGHT: 261.2 LBS | OXYGEN SATURATION: 92 % | DIASTOLIC BLOOD PRESSURE: 78 MMHG | HEIGHT: 66 IN | SYSTOLIC BLOOD PRESSURE: 126 MMHG | HEART RATE: 76 BPM

## 2023-03-02 DIAGNOSIS — J44.9 CHRONIC OBSTRUCTIVE PULMONARY DISEASE, UNSPECIFIED COPD TYPE (HCC): ICD-10-CM

## 2023-03-02 DIAGNOSIS — R60.9 EDEMA, UNSPECIFIED TYPE: ICD-10-CM

## 2023-03-02 DIAGNOSIS — R31.29 OTHER MICROSCOPIC HEMATURIA: ICD-10-CM

## 2023-03-02 DIAGNOSIS — I10 PRIMARY HYPERTENSION: ICD-10-CM

## 2023-03-02 DIAGNOSIS — K74.60 CIRRHOSIS OF LIVER WITHOUT ASCITES, UNSPECIFIED HEPATIC CIRRHOSIS TYPE (HCC): ICD-10-CM

## 2023-03-02 DIAGNOSIS — E11.9 TYPE 2 DIABETES MELLITUS WITHOUT COMPLICATION, WITHOUT LONG-TERM CURRENT USE OF INSULIN (HCC): Primary | ICD-10-CM

## 2023-03-02 LAB
BILIRUBIN, POC: NEGATIVE
BLOOD URINE, POC: NEGATIVE
CLARITY, POC: CLEAR
COLOR, POC: YELLOW
GLUCOSE URINE, POC: NEGATIVE
KETONES, POC: NEGATIVE
LEUKOCYTE EST, POC: NEGATIVE
NITRITE, POC: NEGATIVE
PH, POC: 6
PROTEIN, POC: NEGATIVE
SPECIFIC GRAVITY, POC: 1.02
UROBILINOGEN, POC: 0.2

## 2023-03-02 PROCEDURE — 3074F SYST BP LT 130 MM HG: CPT | Performed by: FAMILY MEDICINE

## 2023-03-02 PROCEDURE — 99214 OFFICE O/P EST MOD 30 MIN: CPT | Performed by: FAMILY MEDICINE

## 2023-03-02 PROCEDURE — 3078F DIAST BP <80 MM HG: CPT | Performed by: FAMILY MEDICINE

## 2023-03-02 PROCEDURE — 1123F ACP DISCUSS/DSCN MKR DOCD: CPT | Performed by: FAMILY MEDICINE

## 2023-03-02 PROCEDURE — 81002 URINALYSIS NONAUTO W/O SCOPE: CPT | Performed by: FAMILY MEDICINE

## 2023-03-02 PROCEDURE — 36415 COLL VENOUS BLD VENIPUNCTURE: CPT | Performed by: FAMILY MEDICINE

## 2023-03-02 ASSESSMENT — ENCOUNTER SYMPTOMS: SHORTNESS OF BREATH: 1

## 2023-03-02 NOTE — PROGRESS NOTES
Chief Complaint   Patient presents with    Follow-up     2 week follow up       HPI:  Roslyn Huang is a 80 y.o. (: 1955) here today   for 2 week follow up. Diabetes  She presents for her follow-up diabetic visit. She has type 2 diabetes mellitus. Her disease course has been stable. Risk factors for coronary artery disease include diabetes mellitus, dyslipidemia, obesity and post-menopausal. Current diabetic treatment includes oral agent (monotherapy). Urinary sxs and back pain not as bad, but pain still present. No blood in urine noted. Has been eating \"a lot of things she is not supposed to. \"  Sugar read as \"high\" last week. Checked again in 30 min, 163    Had tried taking a little higher dose of lasix every other. Swelling improved a little initially, but then ongoing issues. Hx of cirrhosis. Patient's medications, allergies, past medical, surgical, social and family histories were reviewed and updated as appropriate. ROS:  Review of Systems   Constitutional:  Negative for fever. Respiratory:  Positive for shortness of breath. Cardiovascular:  Positive for leg swelling.          Hemoglobin A1C (%)   Date Value   2022 7.2     Microscopic Examination (no units)   Date Value   2020 YES     Microalbumin, Random Urine (mg/dL)   Date Value   2021 <1.20     LDL Calculated (mg/dL)   Date Value   2022 70       Past Medical History:   Diagnosis Date    Anxiety     Arthritis     Depression     Disc degeneration     Frequent UTI     Hyperlipidemia     Hypertension     Liver cirrhosis ([address] 2019    Type 2 diabetes mellitus without complication, without long-term current use of insulin ([address]        Family History   Problem Relation Age of Onset    Diabetes Father     Cancer Father         colon    Cancer Sister         anal cancer, kidney cancer    High Blood Pressure Sister     Cancer Brother         stomach (unsure)    Depression Sister     High Blood Pressure Sister        Social History     Socioeconomic History    Marital status:      Spouse name: Not on file    Number of children: 4    Years of education: Not on file    Highest education level: Not on file   Occupational History    Not on file   Tobacco Use    Smoking status: Every Day     Packs/day: 2.00     Years: 45.00     Pack years: 90.00     Types: Cigarettes    Smokeless tobacco: Never   Vaping Use    Vaping Use: Never used   Substance and Sexual Activity    Alcohol use: No     Alcohol/week: 0.0 standard drinks     Comment: rare beer maybe 1-2 year     Drug use: No    Sexual activity: Yes     Partners: Male   Other Topics Concern    Not on file   Social History Narrative    11/2014 lives with spouse and grandson;home//raising grandchildren;     Social Determinants of Health     Financial Resource Strain: Low Risk     Difficulty of Paying Living Expenses: Not hard at all   Food Insecurity: No Food Insecurity    Worried About Running Out of Food in the Last Year: Never true    [address] in the Last Year: Never true   Transportation Needs: Unknown    Lack of Transportation (Medical): Not on file    Lack of Transportation (Non-Medical): No   Physical Activity: Inactive    Days of Exercise per Week: 0 days    Minutes of Exercise per Session: 0 min   Stress: Not on file   Social Connections: Not on file   Intimate Partner Violence: Not on file   Housing Stability: Unknown    Unable to Pay for Housing in the Last Year: Not on file    Number of Places Lived in the Last Year: Not on file    Unstable Housing in the Last Year: No       Prior to Visit Medications    Medication Sig Taking? Authorizing Provider   metFORMIN (GLUCOPHAGE-XR) 500 MG extended release tablet TAKE ONE (1) TABLET BY MOUTH TWO (2) TIMES DAILY Yes Amy Elmore MD   gabapentin (NEURONTIN) 100 MG capsule TAKE (1) CAPSULE THREE TIMES     DAILY.  Yes Amy Elmore MD   clonazePAM (KLONOPIN) 0.5 MG tablet TAKE ONE (1) TABLET TWICE DAILY AS NEEDED FOR ANXIETY FOR UP TO 30 DAYS Yes Elaine Matthew MD   atorvastatin (LIPITOR) 20 MG tablet TAKE ONE (1) TABLET NIGHTLY Yes Elaine Matthew MD   buPROPion (WELLBUTRIN XL) 150 MG extended release tablet TAKE ONE (1) TABLET IN THE MORNING Yes Elaine Matthew MD   citalopram (CELEXA) 40 MG tablet TAKE 1 TABLET BY MOUTH NIGHTLY Yes FARHAT Russell CNP   Fluticasone-Umeclidin-Vilant (TRELEGY ELLIPTA) 200-62.5-25 MCG/INH AEPB Inhale 1 puff into the lungs daily Yes Elaine Matthew MD   furosemide (LASIX) 20 MG tablet TAKE (1) TABLET DAILY Yes FARHAT Russell CNP   mirtazapine (REMERON) 15 MG tablet TAKE 1 TABLET BY MOUTH NIGHTLY Yes FARHAT Russell CNP   aspirin 325 MG tablet Take 325 mg by mouth daily Yes Historical Provider, MD   albuterol sulfate  (90 Base) MCG/ACT inhaler Inhale 2 puffs into the lungs every 6 hours as needed for Wheezing Yes Elaine Matthew MD   Multiple Vitamins-Minerals (THERAPEUTIC MULTIVITAMIN-MINERALS) tablet Take 1 tablet by mouth daily. Yes Historical Provider, MD       Allergies   Allergen Reactions    Lisinopril      Angioedema 1/27/2017    Trazodone And Nefazodone Other (See Comments)     nightmares    Zithromax [Azithromycin Dihydrate] Nausea And Vomiting       OBJECTIVE:    /78   Pulse 76   Ht 5' 6\" (1.676 m)   Wt 261 lb 3.2 oz (118.5 kg)   SpO2 92%   BMI 42.16 kg/m²     BP Readings from Last 2 Encounters:   03/02/23 126/78   02/16/23 136/86       Wt Readings from Last 3 Encounters:   03/02/23 261 lb 3.2 oz (118.5 kg)   02/16/23 263 lb (119.3 kg)   11/15/22 258 lb (117 kg)       Physical Exam  Constitutional:       Appearance: Normal appearance. HENT:      Head: Normocephalic and atraumatic. Eyes:      Extraocular Movements: Extraocular movements intact. Cardiovascular:      Rate and Rhythm: Normal rate and regular rhythm.    Pulmonary:      Effort: Pulmonary effort is normal.      Breath sounds: Decreased breath sounds present. Musculoskeletal:        Back:       Right lower leg: Edema present. Left lower leg: Edema present. Neurological:      General: No focal deficit present. Mental Status: She is alert and oriented to person, place, and time. Psychiatric:         Mood and Affect: Mood normal.         Behavior: Behavior normal.         ASSESSMENT/PLAN:    1. Type 2 diabetes mellitus without complication, without long-term current use of insulin ([facility] [address]  Rpt labs today. - Comprehensive Metabolic Panel  - Hemoglobin A1C  - Lipid Panel  - Microalbumin / Creatinine Urine Ratio    2. Edema, unspecified type  Ongoing issue. Mild improvement w/ inc lasix. Could be related to liver, others. Rpt labs. - T4, Free  - TSH    3. Other microscopic hematuria  No blood on urine today. Cont to monitor. Given prior blood, rec u/s of kidney, bladder  - POCT Urinalysis no Micro    4. Primary hypertension  The current medical regimen is effective;  continue present plan and medications. 5. Chronic obstructive pulmonary disease, unspecified COPD type ([facility] [address]  Near baseline.       6. Cirrhosis of liver without ascites, unspecified hepatic cirrhosis type ([address]  Rpt labs

## 2023-03-03 LAB
A/G RATIO: 1.4 (ref 1.1–2.2)
ALBUMIN SERPL-MCNC: 4 G/DL (ref 3.4–5)
ALP BLD-CCNC: 134 U/L (ref 40–129)
ALT SERPL-CCNC: 21 U/L (ref 10–40)
ANION GAP SERPL CALCULATED.3IONS-SCNC: 10 MMOL/L (ref 3–16)
AST SERPL-CCNC: 21 U/L (ref 15–37)
BILIRUB SERPL-MCNC: 0.6 MG/DL (ref 0–1)
BUN BLDV-MCNC: 8 MG/DL (ref 7–20)
CALCIUM SERPL-MCNC: 9.4 MG/DL (ref 8.3–10.6)
CHLORIDE BLD-SCNC: 96 MMOL/L (ref 99–110)
CHOLESTEROL, TOTAL: 150 MG/DL (ref 0–199)
CO2: 33 MMOL/L (ref 21–32)
CREAT SERPL-MCNC: 0.6 MG/DL (ref 0.6–1.2)
CREATININE URINE: 42 MG/DL (ref 28–259)
ESTIMATED AVERAGE GLUCOSE: 177.2 MG/DL
GFR SERPL CREATININE-BSD FRML MDRD: >60 ML/MIN/{1.73_M2}
GLUCOSE BLD-MCNC: 146 MG/DL (ref 70–99)
HBA1C MFR BLD: 7.8 %
HDLC SERPL-MCNC: 55 MG/DL (ref 40–60)
LDL CHOLESTEROL CALCULATED: 64 MG/DL
MICROALBUMIN UR-MCNC: <1.2 MG/DL
MICROALBUMIN/CREAT UR-RTO: NORMAL MG/G (ref 0–30)
POTASSIUM SERPL-SCNC: 4.2 MMOL/L (ref 3.5–5.1)
SODIUM BLD-SCNC: 139 MMOL/L (ref 136–145)
T4 FREE: 1.2 NG/DL (ref 0.9–1.8)
TOTAL PROTEIN: 6.9 G/DL (ref 6.4–8.2)
TRIGL SERPL-MCNC: 157 MG/DL (ref 0–150)
TSH SERPL DL<=0.05 MIU/L-ACNC: 2.12 UIU/ML (ref 0.27–4.2)
VLDLC SERPL CALC-MCNC: 31 MG/DL

## 2023-03-03 NOTE — RESULT ENCOUNTER NOTE
Hemoglobin A1c is worse. She had been on Tradjenta in the past.  She had also been on Janumet in the past.  Did she tolerate those medicines? Was there a cost issue? We will need to look at adding either 1 of those types of medications back or a once weekly GLP-1 receptor agonist such as Ozempic or Trulicity to help with glucose control. Thyroid labs are okay. Alkaline phosphatase mildly elevated, similar to improved from prior. Otherwise CMP okay except glucose of 146. Triglycerides slightly elevated, otherwise lipids well controlled.   Urine for microalbumin normal.

## 2023-03-06 RX ORDER — SEMAGLUTIDE 1.34 MG/ML
0.25 INJECTION, SOLUTION SUBCUTANEOUS WEEKLY
Qty: 3 ML | Refills: 3 | Status: SHIPPED | OUTPATIENT
Start: 2023-03-06

## 2023-03-23 ENCOUNTER — HOSPITAL ENCOUNTER (OUTPATIENT)
Dept: GENERAL RADIOLOGY | Age: 68
Discharge: HOME | End: 2023-03-23
Payer: MEDICARE

## 2023-03-23 ENCOUNTER — HOSPITAL ENCOUNTER (OUTPATIENT)
Age: 68
Discharge: HOME | End: 2023-03-23
Payer: MEDICARE

## 2023-03-23 DIAGNOSIS — R52 PAIN MANAGEMENT: ICD-10-CM

## 2023-03-23 PROCEDURE — 72110 X-RAY EXAM L-2 SPINE 4/>VWS: CPT

## 2023-04-17 ENCOUNTER — HOSPITAL ENCOUNTER (OUTPATIENT)
Dept: ULTRASOUND IMAGING | Age: 68
Discharge: HOME | End: 2023-04-17
Payer: MEDICARE

## 2023-04-17 DIAGNOSIS — R31.29 OTHER MICROSCOPIC HEMATURIA: ICD-10-CM

## 2023-04-17 PROCEDURE — 76770 US EXAM ABDO BACK WALL COMP: CPT

## 2023-04-18 NOTE — RESULT ENCOUNTER NOTE
Likely renal cyst, but appears complex. Given hx, rec mri abdomen w/ renal protocol given hematuria and appearance.  Other option would be urology appt

## 2023-04-19 DIAGNOSIS — R20.8 BURNING SENSATION OF FEET: ICD-10-CM

## 2023-04-19 DIAGNOSIS — G47.9 SLEEP DISTURBANCE: ICD-10-CM

## 2023-04-19 DIAGNOSIS — E11.9 TYPE 2 DIABETES MELLITUS WITHOUT COMPLICATION, WITHOUT LONG-TERM CURRENT USE OF INSULIN (HCC): ICD-10-CM

## 2023-04-19 DIAGNOSIS — F32.89 OTHER DEPRESSION: ICD-10-CM

## 2023-04-19 DIAGNOSIS — M51.36 DDD (DEGENERATIVE DISC DISEASE), LUMBAR: ICD-10-CM

## 2023-04-20 DIAGNOSIS — R10.32 LEFT LOWER QUADRANT ABDOMINAL PAIN: Primary | ICD-10-CM

## 2023-04-20 DIAGNOSIS — N28.1 RENAL CYST: ICD-10-CM

## 2023-04-20 DIAGNOSIS — R31.9 HEMATURIA, UNSPECIFIED TYPE: ICD-10-CM

## 2023-04-20 RX ORDER — GABAPENTIN 100 MG/1
CAPSULE ORAL
Qty: 90 CAPSULE | Refills: 2 | Status: SHIPPED | OUTPATIENT
Start: 2023-04-20 | End: 2023-05-20

## 2023-04-20 RX ORDER — BUPROPION HYDROCHLORIDE 150 MG/1
TABLET ORAL
Qty: 30 TABLET | Refills: 3 | Status: SHIPPED | OUTPATIENT
Start: 2023-04-20

## 2023-04-20 RX ORDER — MIRTAZAPINE 15 MG/1
TABLET, FILM COATED ORAL
Qty: 90 TABLET | Refills: 3 | Status: SHIPPED | OUTPATIENT
Start: 2023-04-20

## 2023-04-25 DIAGNOSIS — E11.9 TYPE 2 DIABETES MELLITUS WITHOUT COMPLICATION, WITHOUT LONG-TERM CURRENT USE OF INSULIN (HCC): Primary | ICD-10-CM

## 2023-05-08 ENCOUNTER — HOSPITAL ENCOUNTER (OUTPATIENT)
Dept: MRI IMAGING | Age: 68
Discharge: HOME | End: 2023-05-08
Payer: MEDICARE

## 2023-05-08 ENCOUNTER — HOSPITAL ENCOUNTER (OUTPATIENT)
Age: 68
Discharge: HOME | End: 2023-05-08
Payer: MEDICARE

## 2023-05-08 DIAGNOSIS — R10.32 LEFT LOWER QUADRANT ABDOMINAL PAIN: ICD-10-CM

## 2023-05-08 DIAGNOSIS — E11.9 TYPE 2 DIABETES MELLITUS WITHOUT COMPLICATION, WITHOUT LONG-TERM CURRENT USE OF INSULIN (HCC): ICD-10-CM

## 2023-05-08 DIAGNOSIS — R31.9 HEMATURIA, UNSPECIFIED TYPE: ICD-10-CM

## 2023-05-08 DIAGNOSIS — N28.1 RENAL CYST: ICD-10-CM

## 2023-05-08 LAB
CREAT SERPL-MCNC: 0.6 MG/DL (ref 0.6–1.2)
GFR SERPLBLD CREATININE-BSD FMLA CKD-EPI: >60 ML/MIN/{1.73_M2}

## 2023-05-08 PROCEDURE — 74183 MRI ABD W/O CNTR FLWD CNTR: CPT

## 2023-05-08 PROCEDURE — A9579 GAD-BASE MR CONTRAST NOS,1ML: HCPCS | Performed by: FAMILY MEDICINE

## 2023-05-08 PROCEDURE — 36415 COLL VENOUS BLD VENIPUNCTURE: CPT

## 2023-05-08 PROCEDURE — 82565 ASSAY OF CREATININE: CPT

## 2023-05-08 PROCEDURE — 6360000004 HC RX CONTRAST MEDICATION: Performed by: FAMILY MEDICINE

## 2023-05-08 RX ADMIN — GADOTERIDOL 20 ML: 279.3 INJECTION, SOLUTION INTRAVENOUS at 11:42

## 2023-05-16 ENCOUNTER — OFFICE VISIT (OUTPATIENT)
Dept: PULMONOLOGY | Age: 68
End: 2023-05-16
Payer: MEDICARE

## 2023-05-16 VITALS
HEART RATE: 83 BPM | OXYGEN SATURATION: 92 % | DIASTOLIC BLOOD PRESSURE: 76 MMHG | BODY MASS INDEX: 41.32 KG/M2 | SYSTOLIC BLOOD PRESSURE: 120 MMHG | WEIGHT: 256 LBS

## 2023-05-16 DIAGNOSIS — J44.9 CHRONIC OBSTRUCTIVE PULMONARY DISEASE, UNSPECIFIED COPD TYPE (HCC): Primary | ICD-10-CM

## 2023-05-16 DIAGNOSIS — R49.0 HOARSENESS OF VOICE: ICD-10-CM

## 2023-05-16 PROCEDURE — 99214 OFFICE O/P EST MOD 30 MIN: CPT | Performed by: INTERNAL MEDICINE

## 2023-05-16 PROCEDURE — 3074F SYST BP LT 130 MM HG: CPT | Performed by: INTERNAL MEDICINE

## 2023-05-16 PROCEDURE — 1123F ACP DISCUSS/DSCN MKR DOCD: CPT | Performed by: INTERNAL MEDICINE

## 2023-05-16 PROCEDURE — 3078F DIAST BP <80 MM HG: CPT | Performed by: INTERNAL MEDICINE

## 2023-05-16 RX ORDER — OXYCODONE HYDROCHLORIDE AND ACETAMINOPHEN 5; 325 MG/1; MG/1
TABLET ORAL
COMMUNITY
Start: 2023-04-17

## 2023-05-16 RX ORDER — FLUTICASONE FUROATE, UMECLIDINIUM BROMIDE AND VILANTEROL TRIFENATATE 100; 62.5; 25 UG/1; UG/1; UG/1
1 POWDER RESPIRATORY (INHALATION) DAILY
Qty: 1 EACH | Refills: 5 | COMMUNITY
Start: 2023-05-16

## 2023-05-16 NOTE — PROGRESS NOTES
Outpatient Pulmonary Progress Note  CC: Shortness of breath & wheezing    Interval History 5/16/23  Doing well with trelegy but has noticed hoarseness in evenings. Presenting HPI: Myra Saucedo is a 74 yo with a past medical history liver cirrhosis and emphysema who presents today with a history of increasing severe shortness of breath worse with exertion, associated with chest tightness, treated with Trelegy with improvement. reports that she has been smoking cigarettes. She has a 90.00 pack-year smoking history. She has never used smokeless tobacco.    PHYSICAL EXAM:  Blood pressure 120/76, pulse 83, weight 256 lb (116.1 kg), SpO2 92 %, not currently breastfeeding.' 258# to 256#  Constitutional:  No acute distress. HENT:  Oropharynx is clear and moist.   Neck: No tracheal deviation present. Cardiovascular: Normal heart sounds. + lower extremity edema. Pulmonary/Chest: No wheezes. No rhonchi. No rales. + decreased breath sounds. No accessory muscle usage or stridor. Musculoskeletal: No cyanosis. No clubbing. Skin: Skin is warm and dry. Psychiatric: Normal mood and affect. Neurologic: speech fluent, alert and oriented, strength symmetric      DATA:  PFT 12/23/21 FEV1 1.68 L 67% TLC 4.59 L 86%  DLCO 12.69 54%     LDCT for LCS 11/10/22, c/w CTPA 11/8/21  Mediastinum: Coronary artery calcifications are a marker of atherosclerosis. There are no enlarged thoracic lymph nodes. Calcified granulomatous disease   is noted. Lungs/pleura: The tracheobronchial tree is patent. There is no pneumothorax   or pleural effusion. Mild emphysema involves the bilateral upper lungs with   bibasilar scarring. No change in the 3 mm solid right lower lobe pulmonary nodule. No new   pulmonary nodules have developed. Upper Abdomen: The liver is diffusely low in attenuation consistent with   hepatic steatosis. Status post cholecystectomy. Impression   1.  Unchanged 3 mm solid right pulmonary

## 2023-05-25 DIAGNOSIS — E11.9 TYPE 2 DIABETES MELLITUS WITHOUT COMPLICATION, WITHOUT LONG-TERM CURRENT USE OF INSULIN (HCC): Primary | ICD-10-CM

## 2023-05-25 DIAGNOSIS — F41.0 PANIC DISORDER: ICD-10-CM

## 2023-05-25 RX ORDER — CLONAZEPAM 0.5 MG/1
TABLET ORAL
Qty: 60 TABLET | Refills: 2 | Status: SHIPPED | OUTPATIENT
Start: 2023-05-25 | End: 2023-06-25

## 2023-05-25 RX ORDER — SEMAGLUTIDE 1.34 MG/ML
0.25 INJECTION, SOLUTION SUBCUTANEOUS WEEKLY
Qty: 3 ML | Refills: 3 | OUTPATIENT
Start: 2023-05-25

## 2023-05-25 RX ORDER — SEMAGLUTIDE 0.68 MG/ML
0.25 INJECTION, SOLUTION SUBCUTANEOUS WEEKLY
Qty: 3 ML | Refills: 5 | Status: SHIPPED | OUTPATIENT
Start: 2023-05-25

## 2023-05-25 NOTE — TELEPHONE ENCOUNTER
Date of last refill of this med was 2/21/2023, # of pills given 60 and # of refills given 2. Their next appointment is 7/3/2023, the last date patient was seen was 3/2/2023. Does patient have medication agreement on file? No  Has drug screen been done in last 12 months if needed?  no

## 2023-05-31 ENCOUNTER — HOSPITAL ENCOUNTER (OUTPATIENT)
Dept: MRI IMAGING | Age: 68
Discharge: HOME | End: 2023-05-31
Payer: MEDICARE

## 2023-05-31 DIAGNOSIS — M48.062 LUMBAR STENOSIS WITH NEUROGENIC CLAUDICATION: ICD-10-CM

## 2023-05-31 PROCEDURE — 72148 MRI LUMBAR SPINE W/O DYE: CPT

## 2023-07-03 ENCOUNTER — OFFICE VISIT (OUTPATIENT)
Dept: FAMILY MEDICINE CLINIC | Age: 68
End: 2023-07-03
Payer: MEDICARE

## 2023-07-03 VITALS
HEIGHT: 66 IN | OXYGEN SATURATION: 92 % | DIASTOLIC BLOOD PRESSURE: 82 MMHG | WEIGHT: 251.8 LBS | BODY MASS INDEX: 40.47 KG/M2 | HEART RATE: 78 BPM | SYSTOLIC BLOOD PRESSURE: 122 MMHG

## 2023-07-03 DIAGNOSIS — E78.5 HYPERLIPIDEMIA WITH TARGET LDL LESS THAN 130: ICD-10-CM

## 2023-07-03 DIAGNOSIS — I10 PRIMARY HYPERTENSION: Primary | ICD-10-CM

## 2023-07-03 DIAGNOSIS — E11.9 TYPE 2 DIABETES MELLITUS WITHOUT COMPLICATION, WITHOUT LONG-TERM CURRENT USE OF INSULIN (HCC): ICD-10-CM

## 2023-07-03 DIAGNOSIS — R60.9 DEPENDENT EDEMA: ICD-10-CM

## 2023-07-03 DIAGNOSIS — R06.2 WHEEZING: ICD-10-CM

## 2023-07-03 DIAGNOSIS — R06.02 SHORTNESS OF BREATH: ICD-10-CM

## 2023-07-03 DIAGNOSIS — K74.60 CIRRHOSIS OF LIVER WITHOUT ASCITES, UNSPECIFIED HEPATIC CIRRHOSIS TYPE (HCC): ICD-10-CM

## 2023-07-03 DIAGNOSIS — J44.9 CHRONIC OBSTRUCTIVE PULMONARY DISEASE, UNSPECIFIED COPD TYPE (HCC): ICD-10-CM

## 2023-07-03 PROCEDURE — 3079F DIAST BP 80-89 MM HG: CPT | Performed by: FAMILY MEDICINE

## 2023-07-03 PROCEDURE — 3074F SYST BP LT 130 MM HG: CPT | Performed by: FAMILY MEDICINE

## 2023-07-03 PROCEDURE — 99214 OFFICE O/P EST MOD 30 MIN: CPT | Performed by: FAMILY MEDICINE

## 2023-07-03 PROCEDURE — 3051F HG A1C>EQUAL 7.0%<8.0%: CPT | Performed by: FAMILY MEDICINE

## 2023-07-03 PROCEDURE — 1123F ACP DISCUSS/DSCN MKR DOCD: CPT | Performed by: FAMILY MEDICINE

## 2023-07-03 PROCEDURE — 36415 COLL VENOUS BLD VENIPUNCTURE: CPT | Performed by: FAMILY MEDICINE

## 2023-07-03 RX ORDER — FLUTICASONE FUROATE, UMECLIDINIUM BROMIDE AND VILANTEROL TRIFENATATE 200; 62.5; 25 UG/1; UG/1; UG/1
1 POWDER RESPIRATORY (INHALATION) DAILY
Qty: 1 EACH | Refills: 5 | Status: SHIPPED | OUTPATIENT
Start: 2023-07-03

## 2023-07-03 RX ORDER — ALBUTEROL SULFATE 90 UG/1
2 AEROSOL, METERED RESPIRATORY (INHALATION) EVERY 6 HOURS PRN
Qty: 18 G | Refills: 3 | Status: SHIPPED | OUTPATIENT
Start: 2023-07-03

## 2023-07-03 RX ORDER — FUROSEMIDE 20 MG/1
20 TABLET ORAL 2 TIMES DAILY PRN
Qty: 45 TABLET | Refills: 11 | Status: SHIPPED | OUTPATIENT
Start: 2023-07-03

## 2023-07-03 ASSESSMENT — PATIENT HEALTH QUESTIONNAIRE - PHQ9
8. MOVING OR SPEAKING SO SLOWLY THAT OTHER PEOPLE COULD HAVE NOTICED. OR THE OPPOSITE, BEING SO FIGETY OR RESTLESS THAT YOU HAVE BEEN MOVING AROUND A LOT MORE THAN USUAL: 0
SUM OF ALL RESPONSES TO PHQ QUESTIONS 1-9: 2
SUM OF ALL RESPONSES TO PHQ QUESTIONS 1-9: 2
9. THOUGHTS THAT YOU WOULD BE BETTER OFF DEAD, OR OF HURTING YOURSELF: 0
SUM OF ALL RESPONSES TO PHQ9 QUESTIONS 1 & 2: 0
2. FEELING DOWN, DEPRESSED OR HOPELESS: 0
SUM OF ALL RESPONSES TO PHQ QUESTIONS 1-9: 2
1. LITTLE INTEREST OR PLEASURE IN DOING THINGS: 0
6. FEELING BAD ABOUT YOURSELF - OR THAT YOU ARE A FAILURE OR HAVE LET YOURSELF OR YOUR FAMILY DOWN: 0
5. POOR APPETITE OR OVEREATING: 0
4. FEELING TIRED OR HAVING LITTLE ENERGY: 2
10. IF YOU CHECKED OFF ANY PROBLEMS, HOW DIFFICULT HAVE THESE PROBLEMS MADE IT FOR YOU TO DO YOUR WORK, TAKE CARE OF THINGS AT HOME, OR GET ALONG WITH OTHER PEOPLE: 0
SUM OF ALL RESPONSES TO PHQ QUESTIONS 1-9: 2
3. TROUBLE FALLING OR STAYING ASLEEP: 0
7. TROUBLE CONCENTRATING ON THINGS, SUCH AS READING THE NEWSPAPER OR WATCHING TELEVISION: 0

## 2023-07-03 ASSESSMENT — ENCOUNTER SYMPTOMS
BLOOD IN STOOL: 0
CONSTIPATION: 0
SHORTNESS OF BREATH: 0
DIARRHEA: 0

## 2023-07-03 NOTE — PROGRESS NOTES
Chief Complaint   Patient presents with    Hypertension    Hyperlipidemia    Diabetes       HPI:  Asa Escobar is a 80 y.o. (: 1955) here today   for   Hypertension  This is a chronic problem. The current episode started more than 1 year ago. The problem is unchanged. The problem is controlled. Pertinent negatives include no headaches or shortness of breath. Risk factors for coronary artery disease include dyslipidemia and diabetes mellitus. Past treatments include diuretics. The current treatment provides moderate improvement. There are no compliance problems. Hyperlipidemia  This is a chronic problem. The current episode started more than 1 year ago. Pertinent negatives include no shortness of breath. Current antihyperlipidemic treatment includes statins. There are no compliance problems. Risk factors for coronary artery disease include dyslipidemia, diabetes mellitus and hypertension. Diabetes  She presents for her follow-up diabetic visit. She has type 2 diabetes mellitus. Pertinent negatives for hypoglycemia include no headaches. Risk factors for coronary artery disease include diabetes mellitus, dyslipidemia and hypertension. Current diabetic treatment includes oral agent (monotherapy). Frequency home blood tests: 2-3 times per week. Swelling, krista lle. Seems to be getting worse. Prior u/s 2018. Swelling has gradually gotten worse. Does not eat much salt. Ozempic seemed to help, but cost prohibitive. Has not taken for approx 2 mo. Now only taking metformin. Back to pain mgmt last mo. Planning on procedure to take pressure off nerve. Copd stable. Has f/u and imaging per pulm this fall. Patient's medications, allergies, past medical, surgical, social and family histories were reviewed and updated as appropriate. ROS:  Review of Systems   Constitutional:  Negative for fever. Respiratory:  Negative for shortness of breath.     Cardiovascular:  Positive for leg

## 2023-07-04 LAB
ALBUMIN SERPL-MCNC: 4.1 G/DL (ref 3.4–5)
ALBUMIN/GLOB SERPL: 1.5 {RATIO} (ref 1.1–2.2)
ALP SERPL-CCNC: 129 U/L (ref 40–129)
ALT SERPL-CCNC: 13 U/L (ref 10–40)
ANION GAP SERPL CALCULATED.3IONS-SCNC: 13 MMOL/L (ref 3–16)
AST SERPL-CCNC: 18 U/L (ref 15–37)
BILIRUB SERPL-MCNC: 0.6 MG/DL (ref 0–1)
BUN SERPL-MCNC: 10 MG/DL (ref 7–20)
CALCIUM SERPL-MCNC: 9.6 MG/DL (ref 8.3–10.6)
CHLORIDE SERPL-SCNC: 98 MMOL/L (ref 99–110)
CHOLEST SERPL-MCNC: 144 MG/DL (ref 0–199)
CO2 SERPL-SCNC: 32 MMOL/L (ref 21–32)
CREAT SERPL-MCNC: 0.7 MG/DL (ref 0.6–1.2)
EST. AVERAGE GLUCOSE BLD GHB EST-MCNC: 148.5 MG/DL
GFR SERPLBLD CREATININE-BSD FMLA CKD-EPI: >60 ML/MIN/{1.73_M2}
GLUCOSE SERPL-MCNC: 122 MG/DL (ref 70–99)
HBA1C MFR BLD: 6.8 %
HDLC SERPL-MCNC: 49 MG/DL (ref 40–60)
LDLC SERPL CALC-MCNC: 65 MG/DL
POTASSIUM SERPL-SCNC: 4.1 MMOL/L (ref 3.5–5.1)
PROT SERPL-MCNC: 6.8 G/DL (ref 6.4–8.2)
SODIUM SERPL-SCNC: 143 MMOL/L (ref 136–145)
TRIGL SERPL-MCNC: 151 MG/DL (ref 0–150)
VLDLC SERPL CALC-MCNC: 30 MG/DL

## 2023-07-04 NOTE — RESULT ENCOUNTER NOTE
Lipids overall slightly improved. Glucose 122. O/w cmp ok. Ha1c improved. Cont current tx and keep up the good work.

## 2023-07-19 DIAGNOSIS — R60.9 DEPENDENT EDEMA: ICD-10-CM

## 2023-07-19 RX ORDER — FUROSEMIDE 40 MG/1
40 TABLET ORAL DAILY
Qty: 90 TABLET | Refills: 1 | Status: SHIPPED | OUTPATIENT
Start: 2023-07-19

## 2023-07-19 RX ORDER — FLASH GLUCOSE SCANNING READER
1 EACH MISCELLANEOUS
Qty: 1 EACH | Refills: 0 | Status: SHIPPED | OUTPATIENT
Start: 2023-07-19

## 2023-07-19 RX ORDER — FLASH GLUCOSE SENSOR
1 KIT MISCELLANEOUS DAILY
Qty: 1 EACH | Refills: 0 | Status: SHIPPED | OUTPATIENT
Start: 2023-07-19

## 2023-07-19 RX ORDER — FLASH GLUCOSE SENSOR
1 KIT MISCELLANEOUS
Qty: 2 EACH | Refills: 5 | Status: SHIPPED | OUTPATIENT
Start: 2023-07-19

## 2023-07-25 DIAGNOSIS — F41.0 PANIC DISORDER: ICD-10-CM

## 2023-07-25 RX ORDER — CLONAZEPAM 0.5 MG/1
TABLET ORAL
Qty: 60 TABLET | Refills: 0 | Status: SHIPPED | OUTPATIENT
Start: 2023-07-27 | End: 2023-08-26

## 2023-07-25 NOTE — TELEPHONE ENCOUNTER
Date of last refill of this med was 5/25/2023, # of pills given 60 and # of refills given 2. Their next appointment is 11/8/2023, the last date patient was seen was 7/3/2023. Does patient have medication agreement on file? No  Has drug screen been done in last 12 months if needed?  no

## 2023-07-31 DIAGNOSIS — R20.8 BURNING SENSATION OF FEET: ICD-10-CM

## 2023-07-31 DIAGNOSIS — M51.36 DDD (DEGENERATIVE DISC DISEASE), LUMBAR: ICD-10-CM

## 2023-07-31 DIAGNOSIS — E11.9 TYPE 2 DIABETES MELLITUS WITHOUT COMPLICATION, WITHOUT LONG-TERM CURRENT USE OF INSULIN (HCC): ICD-10-CM

## 2023-07-31 RX ORDER — GABAPENTIN 100 MG/1
CAPSULE ORAL
Qty: 90 CAPSULE | Refills: 2 | OUTPATIENT
Start: 2023-07-31 | End: 2023-08-30

## 2023-07-31 NOTE — TELEPHONE ENCOUNTER
Date of last refill of this med was 4/20/202, # of pills given 90 and # of refills given 2. Their next appointment is 11/8/2023, the last date patient was seen was 7/3/2023. Does patient have medication agreement on file? No  Has drug screen been done in last 12 months if needed?  no

## 2023-08-28 DIAGNOSIS — J44.9 CHRONIC OBSTRUCTIVE PULMONARY DISEASE, UNSPECIFIED COPD TYPE (HCC): ICD-10-CM

## 2023-08-28 RX ORDER — FLUTICASONE FUROATE, UMECLIDINIUM BROMIDE AND VILANTEROL TRIFENATATE 200; 62.5; 25 UG/1; UG/1; UG/1
1 POWDER RESPIRATORY (INHALATION) DAILY
Qty: 1 EACH | Refills: 5 | Status: SHIPPED | COMMUNITY
Start: 2023-08-28

## 2023-08-30 DIAGNOSIS — E11.9 TYPE 2 DIABETES MELLITUS WITHOUT COMPLICATION, WITHOUT LONG-TERM CURRENT USE OF INSULIN (HCC): ICD-10-CM

## 2023-08-30 DIAGNOSIS — M51.36 DDD (DEGENERATIVE DISC DISEASE), LUMBAR: ICD-10-CM

## 2023-08-30 DIAGNOSIS — R20.8 BURNING SENSATION OF FEET: ICD-10-CM

## 2023-08-30 RX ORDER — GABAPENTIN 100 MG/1
CAPSULE ORAL
Qty: 90 CAPSULE | Refills: 2 | Status: SHIPPED | OUTPATIENT
Start: 2023-08-30 | End: 2023-09-29

## 2023-09-28 DIAGNOSIS — F41.0 PANIC DISORDER: ICD-10-CM

## 2023-09-28 RX ORDER — CLONAZEPAM 0.5 MG/1
TABLET ORAL
Qty: 60 TABLET | Refills: 0 | Status: SHIPPED | OUTPATIENT
Start: 2023-09-28 | End: 2023-10-28

## 2023-10-04 DIAGNOSIS — F32.89 OTHER DEPRESSION: ICD-10-CM

## 2023-10-04 RX ORDER — BUPROPION HYDROCHLORIDE 150 MG/1
TABLET ORAL
Qty: 30 TABLET | Refills: 3 | Status: SHIPPED | OUTPATIENT
Start: 2023-10-04

## 2023-10-11 ENCOUNTER — COMMUNITY OUTREACH (OUTPATIENT)
Dept: FAMILY MEDICINE CLINIC | Age: 68
End: 2023-10-11

## 2023-10-11 NOTE — PROGRESS NOTES
Patient's HM shows they are overdue for Dexa and Mammogram.  Care Everywhere and  files searched. No results to attach to order nor HM updated.

## 2023-11-08 ENCOUNTER — OFFICE VISIT (OUTPATIENT)
Dept: FAMILY MEDICINE CLINIC | Age: 68
End: 2023-11-08
Payer: MEDICARE

## 2023-11-08 VITALS
HEART RATE: 80 BPM | BODY MASS INDEX: 38.15 KG/M2 | SYSTOLIC BLOOD PRESSURE: 138 MMHG | OXYGEN SATURATION: 93 % | WEIGHT: 237.4 LBS | DIASTOLIC BLOOD PRESSURE: 84 MMHG | HEIGHT: 66 IN

## 2023-11-08 DIAGNOSIS — J44.9 CHRONIC OBSTRUCTIVE PULMONARY DISEASE, UNSPECIFIED COPD TYPE (HCC): ICD-10-CM

## 2023-11-08 DIAGNOSIS — K74.60 CIRRHOSIS OF LIVER WITHOUT ASCITES, UNSPECIFIED HEPATIC CIRRHOSIS TYPE (HCC): Primary | ICD-10-CM

## 2023-11-08 DIAGNOSIS — E11.9 TYPE 2 DIABETES MELLITUS WITHOUT COMPLICATION, WITHOUT LONG-TERM CURRENT USE OF INSULIN (HCC): ICD-10-CM

## 2023-11-08 DIAGNOSIS — E78.5 HYPERLIPIDEMIA WITH TARGET LDL LESS THAN 130: ICD-10-CM

## 2023-11-08 DIAGNOSIS — I10 PRIMARY HYPERTENSION: ICD-10-CM

## 2023-11-08 DIAGNOSIS — F41.0 PANIC DISORDER: ICD-10-CM

## 2023-11-08 DIAGNOSIS — M51.36 DDD (DEGENERATIVE DISC DISEASE), LUMBAR: ICD-10-CM

## 2023-11-08 LAB — HBA1C MFR BLD: 6.5 %

## 2023-11-08 PROCEDURE — 83036 HEMOGLOBIN GLYCOSYLATED A1C: CPT | Performed by: FAMILY MEDICINE

## 2023-11-08 PROCEDURE — 3075F SYST BP GE 130 - 139MM HG: CPT | Performed by: FAMILY MEDICINE

## 2023-11-08 PROCEDURE — 99214 OFFICE O/P EST MOD 30 MIN: CPT | Performed by: FAMILY MEDICINE

## 2023-11-08 PROCEDURE — 3044F HG A1C LEVEL LT 7.0%: CPT | Performed by: FAMILY MEDICINE

## 2023-11-08 PROCEDURE — 3079F DIAST BP 80-89 MM HG: CPT | Performed by: FAMILY MEDICINE

## 2023-11-08 PROCEDURE — 1123F ACP DISCUSS/DSCN MKR DOCD: CPT | Performed by: FAMILY MEDICINE

## 2023-11-08 RX ORDER — CLONAZEPAM 0.5 MG/1
TABLET ORAL
Qty: 60 TABLET | Refills: 0 | Status: SHIPPED | OUTPATIENT
Start: 2023-11-08 | End: 2023-12-08

## 2023-11-08 ASSESSMENT — ENCOUNTER SYMPTOMS: SHORTNESS OF BREATH: 1

## 2023-11-08 NOTE — PROGRESS NOTES
Chief Complaint   Patient presents with    Hypertension    Diabetes    Hyperlipidemia       HPI:  Mara Castrejon is a 80 y.o. (: 1955) here today   for   Hypertension  This is a chronic problem. The current episode started more than 1 year ago. The problem is controlled. Associated symptoms include shortness of breath. Pertinent negatives include no chest pain or headaches. Risk factors for coronary artery disease include smoking/tobacco exposure, dyslipidemia and diabetes mellitus. Past treatments include diuretics. Diabetes  She presents for her follow-up diabetic visit. She has type 2 diabetes mellitus. Pertinent negatives for hypoglycemia include no headaches. Pertinent negatives for diabetes include no chest pain. Risk factors for coronary artery disease include diabetes mellitus, dyslipidemia, hypertension and tobacco exposure. Current diabetic treatments: ozempic, metformin. She monitors blood glucose at home 3-4 x per day. An ACE inhibitor/angiotensin II receptor blocker is not being taken. Hyperlipidemia  This is a chronic problem. The current episode started more than 1 year ago. Associated symptoms include shortness of breath. Pertinent negatives include no chest pain. Current antihyperlipidemic treatment includes statins. There are no compliance problems. Risk factors for coronary artery disease include diabetes mellitus, hypertension and dyslipidemia. Early satiety. Less potatoes and bread (don't taste good) since on ozempic. Avoiding pasta. No issues w/ constipation. Has been using linda system. Has been checking sugars more consistently and has adjusted diet based on results. Trelegy working well re copd. Still smoking. Has been trying to quit. Diff due to stress. Has been anton celexa and wellbutrin overall. Having back surgery later this mo. To be done by pain mgmt.       Patient's medications, allergies, past medical, surgical, social and family histories were reviewed

## 2023-11-09 DIAGNOSIS — E11.9 TYPE 2 DIABETES MELLITUS WITHOUT COMPLICATION, WITHOUT LONG-TERM CURRENT USE OF INSULIN (HCC): ICD-10-CM

## 2023-11-09 RX ORDER — SEMAGLUTIDE 0.68 MG/ML
0.25 INJECTION, SOLUTION SUBCUTANEOUS WEEKLY
Qty: 3 ML | Refills: 0 | Status: SHIPPED | COMMUNITY
Start: 2023-11-09

## 2023-11-14 ENCOUNTER — TELEPHONE (OUTPATIENT)
Dept: PULMONOLOGY | Age: 68
End: 2023-11-14

## 2023-11-14 ENCOUNTER — OFFICE VISIT (OUTPATIENT)
Dept: PULMONOLOGY | Age: 68
End: 2023-11-14
Payer: MEDICARE

## 2023-11-14 ENCOUNTER — HOSPITAL ENCOUNTER (OUTPATIENT)
Dept: CT IMAGING | Age: 68
Discharge: HOME | End: 2023-11-14
Payer: MEDICARE

## 2023-11-14 VITALS
DIASTOLIC BLOOD PRESSURE: 74 MMHG | HEART RATE: 77 BPM | SYSTOLIC BLOOD PRESSURE: 128 MMHG | WEIGHT: 241.6 LBS | BODY MASS INDEX: 38.83 KG/M2 | HEIGHT: 66 IN | OXYGEN SATURATION: 93 % | RESPIRATION RATE: 16 BRPM

## 2023-11-14 DIAGNOSIS — J44.9 CHRONIC OBSTRUCTIVE PULMONARY DISEASE, UNSPECIFIED COPD TYPE (HCC): Primary | ICD-10-CM

## 2023-11-14 DIAGNOSIS — Z87.891 PERSONAL HISTORY OF TOBACCO USE: ICD-10-CM

## 2023-11-14 PROCEDURE — 3074F SYST BP LT 130 MM HG: CPT | Performed by: INTERNAL MEDICINE

## 2023-11-14 PROCEDURE — 71271 CT THORAX LUNG CANCER SCR C-: CPT

## 2023-11-14 PROCEDURE — 3078F DIAST BP <80 MM HG: CPT | Performed by: INTERNAL MEDICINE

## 2023-11-14 PROCEDURE — 1123F ACP DISCUSS/DSCN MKR DOCD: CPT | Performed by: INTERNAL MEDICINE

## 2023-11-14 PROCEDURE — 99214 OFFICE O/P EST MOD 30 MIN: CPT | Performed by: INTERNAL MEDICINE

## 2023-11-14 NOTE — TELEPHONE ENCOUNTER
Message  Received: Today  MD Rajani Buckner MA; [place], [place]; Feliberto Kayser, MA  Please tell pt that her LDCT shows no new concerning finding. Schedule f/u in 12 months with LDCT prior.

## 2023-11-14 NOTE — TELEPHONE ENCOUNTER
Watch for final CT read.      -Call for result of LDCT in 1-2 days; tentatively plan LDCT in 12 months, schedule at future visit

## 2023-11-14 NOTE — PROGRESS NOTES
Outpatient Pulmonary Progress Note  CC: Shortness of breath & wheezing    Interval History 5/16/23  Doing well with trelegy but has noticed hoarseness in evenings. Presenting HPI: Cain Ramirez is a 72 yo with a past medical history liver cirrhosis and emphysema who presents today with a history of increasing severe shortness of breath worse with exertion, associated with chest tightness, treated with Trelegy with improvement. reports that she has been smoking cigarettes. She has a 90.00 pack-year smoking history. She has never used smokeless tobacco.    PHYSICAL EXAM:  Blood pressure 128/74, pulse 77, resp. rate 16, height 1.676 m (5' 5.98\"), weight 109.6 kg (241 lb 9.6 oz), SpO2 93 %, not currently breastfeeding.' 258# to 256#  Constitutional:  No acute distress. HENT:  Oropharynx is clear and moist.   Neck: No tracheal deviation present. Cardiovascular: Normal heart sounds. + lower extremity edema. Pulmonary/Chest: No wheezes. No rhonchi. No rales. + decreased breath sounds. No accessory muscle usage or stridor. Musculoskeletal: No cyanosis. No clubbing. Skin: Skin is warm and dry. Psychiatric: Normal mood and affect. Neurologic: speech fluent, alert and oriented, strength symmetric      DATA:  PFT 12/23/21 FEV1 1.68 L 67% TLC 4.59 L 86%  DLCO 12.69 54%     LDCT for LCS 11/10/22, c/w CTPA 11/8/21  Mediastinum: Coronary artery calcifications are a marker of atherosclerosis. There are no enlarged thoracic lymph nodes. Calcified granulomatous disease   is noted. Lungs/pleura: The tracheobronchial tree is patent. There is no pneumothorax   or pleural effusion. Mild emphysema involves the bilateral upper lungs with   bibasilar scarring. No change in the 3 mm solid right lower lobe pulmonary nodule. No new   pulmonary nodules have developed. Upper Abdomen: The liver is diffusely low in attenuation consistent with   hepatic steatosis. Status post cholecystectomy.
No

## 2023-11-17 NOTE — TELEPHONE ENCOUNTER
Called [phone] (home)   Spoke w/ pt and informed Dr. Barbra Chandra says Please tell pt that her LDCT shows no new concerning finding. Schedule f/u in 12 months with LDCT prior. Pt voiced understanding and declined to shed appt at that time.

## 2023-12-04 DIAGNOSIS — F32.A DEPRESSION, UNSPECIFIED DEPRESSION TYPE: ICD-10-CM

## 2023-12-04 RX ORDER — CITALOPRAM 40 MG/1
TABLET ORAL
Qty: 90 TABLET | Refills: 3 | Status: SHIPPED | OUTPATIENT
Start: 2023-12-04

## 2023-12-06 ENCOUNTER — TELEPHONE (OUTPATIENT)
Dept: FAMILY MEDICINE CLINIC | Age: 68
End: 2023-12-06

## 2023-12-08 DIAGNOSIS — F41.0 PANIC DISORDER: ICD-10-CM

## 2023-12-08 DIAGNOSIS — E11.9 TYPE 2 DIABETES MELLITUS WITHOUT COMPLICATION, WITHOUT LONG-TERM CURRENT USE OF INSULIN (HCC): ICD-10-CM

## 2023-12-08 DIAGNOSIS — M51.36 DDD (DEGENERATIVE DISC DISEASE), LUMBAR: ICD-10-CM

## 2023-12-08 DIAGNOSIS — R20.8 BURNING SENSATION OF FEET: ICD-10-CM

## 2023-12-08 RX ORDER — CLONAZEPAM 0.5 MG/1
TABLET ORAL
Qty: 60 TABLET | Refills: 0 | Status: SHIPPED | OUTPATIENT
Start: 2023-12-08 | End: 2024-01-08

## 2023-12-08 RX ORDER — GABAPENTIN 100 MG/1
CAPSULE ORAL
Qty: 90 CAPSULE | Refills: 0 | Status: SHIPPED | OUTPATIENT
Start: 2023-12-08 | End: 2024-01-08

## 2023-12-08 NOTE — TELEPHONE ENCOUNTER
Date of last refill of this med was 8/30/2023, # of pills given 90 and # of refills given 2.  Their next appointment is 3/11/2024, the last date patient was seen was 11/14/2023.  Does patient have medication agreement on file? No  Has drug screen been done in last 12 months if needed? no

## 2023-12-27 DIAGNOSIS — I10 ESSENTIAL HYPERTENSION: ICD-10-CM

## 2023-12-27 DIAGNOSIS — E78.5 HYPERLIPIDEMIA WITH TARGET LDL LESS THAN 130: ICD-10-CM

## 2023-12-27 RX ORDER — ATORVASTATIN CALCIUM 20 MG/1
TABLET, FILM COATED ORAL
Qty: 90 TABLET | Refills: 3 | Status: SHIPPED | OUTPATIENT
Start: 2023-12-27

## 2024-01-08 DIAGNOSIS — E11.9 TYPE 2 DIABETES MELLITUS WITHOUT COMPLICATION, WITHOUT LONG-TERM CURRENT USE OF INSULIN (HCC): ICD-10-CM

## 2024-01-08 DIAGNOSIS — M51.36 DDD (DEGENERATIVE DISC DISEASE), LUMBAR: ICD-10-CM

## 2024-01-08 DIAGNOSIS — R60.9 DEPENDENT EDEMA: ICD-10-CM

## 2024-01-08 DIAGNOSIS — F41.0 PANIC DISORDER: ICD-10-CM

## 2024-01-08 DIAGNOSIS — R20.8 BURNING SENSATION OF FEET: ICD-10-CM

## 2024-01-08 RX ORDER — GABAPENTIN 100 MG/1
CAPSULE ORAL
Qty: 90 CAPSULE | Refills: 2 | Status: SHIPPED | OUTPATIENT
Start: 2024-01-08 | End: 2024-02-07

## 2024-01-08 RX ORDER — CLONAZEPAM 0.5 MG/1
TABLET ORAL
Qty: 60 TABLET | Refills: 0 | Status: SHIPPED | OUTPATIENT
Start: 2024-01-08 | End: 2024-02-07

## 2024-01-08 RX ORDER — FUROSEMIDE 40 MG/1
40 TABLET ORAL DAILY
Qty: 90 TABLET | Refills: 1 | Status: SHIPPED | OUTPATIENT
Start: 2024-01-08

## 2024-01-08 NOTE — TELEPHONE ENCOUNTER
Date of last refill of this med was 12/8/23, # of pills given 60 and # of refills given 0.  Their next appointment is 3/11/24, the last date patient was seen was 11/8/23.  Does patient have medication agreement on file? No

## 2024-01-12 ENCOUNTER — TELEPHONE (OUTPATIENT)
Dept: FAMILY MEDICINE CLINIC | Age: 69
End: 2024-01-12

## 2024-01-12 RX ORDER — BENZONATATE 200 MG/1
200 CAPSULE ORAL 3 TIMES DAILY PRN
Qty: 30 CAPSULE | Refills: 0 | Status: SHIPPED | OUTPATIENT
Start: 2024-01-12 | End: 2024-01-22

## 2024-01-12 RX ORDER — OSELTAMIVIR PHOSPHATE 75 MG/1
75 CAPSULE ORAL 2 TIMES DAILY
Qty: 10 CAPSULE | Refills: 0 | Status: SHIPPED | OUTPATIENT
Start: 2024-01-12 | End: 2024-01-17

## 2024-01-12 NOTE — TELEPHONE ENCOUNTER
Tessalon 200mg po q8h prn with a full glass of water.  #30, no rf.   w/ influenza.  Ok for tamiflu 75mg po bid x 5d.  If worsening over the weekend, ER

## 2024-01-12 NOTE — TELEPHONE ENCOUNTER
Samina called and said she is having same problems as her . She is having flu like symptoms and feels like she has pneumonia. She was wondering if you would call something in for her like you gave Aries. Symptoms started yesterday Cough (productive), sneezing, joint pain, body aches she has had fever on and off does not know how high, and chills. [facility] [place] is the pharmacy.

## 2024-01-17 ENCOUNTER — OFFICE VISIT (OUTPATIENT)
Dept: FAMILY MEDICINE CLINIC | Age: 69
End: 2024-01-17
Payer: MEDICARE

## 2024-01-17 VITALS
DIASTOLIC BLOOD PRESSURE: 72 MMHG | HEART RATE: 74 BPM | WEIGHT: 228.8 LBS | BODY MASS INDEX: 36.77 KG/M2 | OXYGEN SATURATION: 91 % | HEIGHT: 66 IN | SYSTOLIC BLOOD PRESSURE: 118 MMHG

## 2024-01-17 DIAGNOSIS — J11.1 INFLUENZA: ICD-10-CM

## 2024-01-17 DIAGNOSIS — J44.1 COPD EXACERBATION (HCC): Primary | ICD-10-CM

## 2024-01-17 PROCEDURE — 1123F ACP DISCUSS/DSCN MKR DOCD: CPT | Performed by: FAMILY MEDICINE

## 2024-01-17 PROCEDURE — 99214 OFFICE O/P EST MOD 30 MIN: CPT | Performed by: FAMILY MEDICINE

## 2024-01-17 PROCEDURE — 3074F SYST BP LT 130 MM HG: CPT | Performed by: FAMILY MEDICINE

## 2024-01-17 PROCEDURE — 3078F DIAST BP <80 MM HG: CPT | Performed by: FAMILY MEDICINE

## 2024-01-17 RX ORDER — DOXYCYCLINE HYCLATE 100 MG
100 TABLET ORAL 2 TIMES DAILY
Qty: 20 TABLET | Refills: 0 | Status: SHIPPED | OUTPATIENT
Start: 2024-01-17 | End: 2024-01-27

## 2024-01-17 RX ORDER — PREDNISONE 20 MG/1
40 TABLET ORAL DAILY
Qty: 10 TABLET | Refills: 0 | Status: SHIPPED | OUTPATIENT
Start: 2024-01-17 | End: 2024-01-22

## 2024-01-17 ASSESSMENT — PATIENT HEALTH QUESTIONNAIRE - PHQ9
2. FEELING DOWN, DEPRESSED OR HOPELESS: 0
SUM OF ALL RESPONSES TO PHQ QUESTIONS 1-9: 0
4. FEELING TIRED OR HAVING LITTLE ENERGY: 0
10. IF YOU CHECKED OFF ANY PROBLEMS, HOW DIFFICULT HAVE THESE PROBLEMS MADE IT FOR YOU TO DO YOUR WORK, TAKE CARE OF THINGS AT HOME, OR GET ALONG WITH OTHER PEOPLE: 0
8. MOVING OR SPEAKING SO SLOWLY THAT OTHER PEOPLE COULD HAVE NOTICED. OR THE OPPOSITE, BEING SO FIGETY OR RESTLESS THAT YOU HAVE BEEN MOVING AROUND A LOT MORE THAN USUAL: 0
SUM OF ALL RESPONSES TO PHQ QUESTIONS 1-9: 0
3. TROUBLE FALLING OR STAYING ASLEEP: 0
SUM OF ALL RESPONSES TO PHQ QUESTIONS 1-9: 0
SUM OF ALL RESPONSES TO PHQ QUESTIONS 1-9: 0
5. POOR APPETITE OR OVEREATING: 0
9. THOUGHTS THAT YOU WOULD BE BETTER OFF DEAD, OR OF HURTING YOURSELF: 0
SUM OF ALL RESPONSES TO PHQ9 QUESTIONS 1 & 2: 0
1. LITTLE INTEREST OR PLEASURE IN DOING THINGS: 0
6. FEELING BAD ABOUT YOURSELF - OR THAT YOU ARE A FAILURE OR HAVE LET YOURSELF OR YOUR FAMILY DOWN: 0
7. TROUBLE CONCENTRATING ON THINGS, SUCH AS READING THE NEWSPAPER OR WATCHING TELEVISION: 0

## 2024-01-17 ASSESSMENT — ENCOUNTER SYMPTOMS
COUGH: 1
SHORTNESS OF BREATH: 1

## 2024-01-17 NOTE — PROGRESS NOTES
Chief Complaint   Patient presents with    Chest Congestion    Head Congestion       HPI:  Samina Sepulveda is a 69 y.o. (: 1955) here today   for head and chest congestion.  Symptoms started on Friday.  Cough  This is a new problem. The current episode started in the past 7 days (began friday.). The problem has been gradually improving. The cough is Productive of sputum. Associated symptoms include a fever, nasal congestion and shortness of breath. Treatments tried: tamiflu.  cough medication.   Had sig fatigue.  Sig muscle pain.  Some generalized weakness.  Had fevers as well.  Seems to be feeling a little better, but still feels drained.  Now appetite improving.  Still using inhalers.      Patient's medications, allergies, past medical, surgical, social and family histories were reviewed and updated as appropriate.    ROS:  Review of Systems   Constitutional:  Positive for appetite change and fever.   Respiratory:  Positive for cough and shortness of breath.            Hemoglobin A1C (%)   Date Value   2023 6.5     LDL Calculated (mg/dL)   Date Value   2023 65       Past Medical History:   Diagnosis Date    Anxiety     Arthritis     Depression     Disc degeneration     Frequent UTI     Hyperlipidemia     Hypertension     Liver cirrhosis (HCC) 2019    Type 2 diabetes mellitus without complication, without long-term current use of insulin (HCC)        Family History   Problem Relation Age of Onset    Diabetes Father     Cancer Father         colon    Cancer Sister         anal cancer, kidney cancer    High Blood Pressure Sister     Cancer Brother         stomach (unsure)    Depression Sister     High Blood Pressure Sister        Social History     Socioeconomic History    Marital status:      Spouse name: Not on file    Number of children: 4    Years of education: Not on file    Highest education level: Not on file   Occupational History    Not on file   Tobacco Use    Smoking

## 2024-02-07 DIAGNOSIS — F32.89 OTHER DEPRESSION: ICD-10-CM

## 2024-02-07 RX ORDER — BUPROPION HYDROCHLORIDE 150 MG/1
TABLET ORAL
Qty: 30 TABLET | Refills: 3 | Status: SHIPPED | OUTPATIENT
Start: 2024-02-07

## 2024-02-07 NOTE — TELEPHONE ENCOUNTER
Future appt scheduled 03/11/2024                          Last appt 01/17/2024      Last Written 10/04/2023    buPROPion (WELLBUTRIN XL) 150 MG extended release tablet  #30  3 RF

## 2024-02-12 DIAGNOSIS — F41.0 PANIC DISORDER: ICD-10-CM

## 2024-02-12 RX ORDER — CLONAZEPAM 0.5 MG/1
TABLET ORAL
Qty: 60 TABLET | Refills: 0 | Status: SHIPPED | OUTPATIENT
Start: 2024-02-12 | End: 2024-03-13

## 2024-03-06 ENCOUNTER — APPOINTMENT (OUTPATIENT)
Dept: GENERAL RADIOLOGY | Age: 69
End: 2024-03-06
Payer: MEDICARE

## 2024-03-06 ENCOUNTER — APPOINTMENT (OUTPATIENT)
Dept: NUCLEAR MEDICINE | Age: 69
End: 2024-03-06
Payer: MEDICARE

## 2024-03-06 ENCOUNTER — HOSPITAL ENCOUNTER (INPATIENT)
Age: 69
LOS: 2 days | Discharge: ANOTHER HOSPITAL | End: 2024-03-08
Attending: STUDENT IN AN ORGANIZED HEALTH CARE EDUCATION/TRAINING PROGRAM | Admitting: INTERNAL MEDICINE
Payer: MEDICARE

## 2024-03-06 DIAGNOSIS — R07.9 CHEST PAIN, UNSPECIFIED TYPE: Primary | ICD-10-CM

## 2024-03-06 PROBLEM — J96.01 ACUTE RESPIRATORY FAILURE WITH HYPOXIA (HCC): Status: ACTIVE | Noted: 2024-03-06

## 2024-03-06 LAB
ALBUMIN SERPL-MCNC: 4 G/DL (ref 3.4–5)
ALBUMIN/GLOB SERPL: 1.6 {RATIO} (ref 1.1–2.2)
ALP SERPL-CCNC: 111 U/L (ref 40–129)
ALT SERPL-CCNC: 10 U/L (ref 10–40)
ANION GAP SERPL CALCULATED.3IONS-SCNC: 12 MMOL/L (ref 3–16)
AST SERPL-CCNC: 17 U/L (ref 15–37)
BASE EXCESS BLDV CALC-SCNC: 5.6 MMOL/L (ref -3–3)
BASOPHILS # BLD: 0 K/UL (ref 0–0.2)
BASOPHILS NFR BLD: 0.4 %
BILIRUB SERPL-MCNC: 0.6 MG/DL (ref 0–1)
BILIRUB UR QL STRIP.AUTO: NEGATIVE
BUN SERPL-MCNC: 7 MG/DL (ref 7–20)
CALCIUM SERPL-MCNC: 9 MG/DL (ref 8.3–10.6)
CHLORIDE SERPL-SCNC: 94 MMOL/L (ref 99–110)
CHOLEST SERPL-MCNC: 144 MG/DL (ref 0–199)
CLARITY UR: CLEAR
CO2 BLDV-SCNC: 33 MMOL/L
CO2 SERPL-SCNC: 30 MMOL/L (ref 21–32)
COHGB MFR BLDV: 6.3 % (ref 0–1.5)
COLOR UR: YELLOW
CREAT SERPL-MCNC: <0.5 MG/DL (ref 0.6–1.2)
D DIMER: 0.45 UG/ML FEU (ref 0–0.6)
DEPRECATED RDW RBC AUTO: 14.9 % (ref 12.4–15.4)
EKG ATRIAL RATE: 80 BPM
EKG ATRIAL RATE: 92 BPM
EKG DIAGNOSIS: NORMAL
EKG DIAGNOSIS: NORMAL
EKG P AXIS: 27 DEGREES
EKG P AXIS: 29 DEGREES
EKG P-R INTERVAL: 166 MS
EKG P-R INTERVAL: 168 MS
EKG Q-T INTERVAL: 396 MS
EKG Q-T INTERVAL: 402 MS
EKG QRS DURATION: 122 MS
EKG QRS DURATION: 124 MS
EKG QTC CALCULATION (BAZETT): 463 MS
EKG QTC CALCULATION (BAZETT): 489 MS
EKG R AXIS: -65 DEGREES
EKG R AXIS: -72 DEGREES
EKG T AXIS: 55 DEGREES
EKG T AXIS: 66 DEGREES
EKG VENTRICULAR RATE: 80 BPM
EKG VENTRICULAR RATE: 92 BPM
EOSINOPHIL # BLD: 0.1 K/UL (ref 0–0.6)
EOSINOPHIL NFR BLD: 0.8 %
FLUAV RNA RESP QL NAA+PROBE: NOT DETECTED
FLUBV RNA RESP QL NAA+PROBE: NOT DETECTED
GFR SERPLBLD CREATININE-BSD FMLA CKD-EPI: >60 ML/MIN/{1.73_M2}
GLUCOSE BLD-MCNC: 119 MG/DL (ref 70–99)
GLUCOSE BLD-MCNC: 139 MG/DL (ref 70–99)
GLUCOSE BLD-MCNC: 141 MG/DL (ref 70–99)
GLUCOSE BLD-MCNC: 158 MG/DL (ref 70–99)
GLUCOSE SERPL-MCNC: 172 MG/DL (ref 70–99)
GLUCOSE UR STRIP.AUTO-MCNC: NEGATIVE MG/DL
HCO3 BLDV-SCNC: 31.7 MMOL/L (ref 23–29)
HCT VFR BLD AUTO: 45.4 % (ref 36–48)
HDLC SERPL-MCNC: 42 MG/DL (ref 40–60)
HGB BLD-MCNC: 15.7 G/DL (ref 12–16)
HGB UR QL STRIP.AUTO: NEGATIVE
INR PPP: 0.92 (ref 0.84–1.16)
KETONES UR STRIP.AUTO-MCNC: NEGATIVE MG/DL
LACTATE BLDV-SCNC: 1.3 MMOL/L (ref 0.4–2)
LDLC SERPL CALC-MCNC: 78 MG/DL
LEUKOCYTE ESTERASE UR QL STRIP.AUTO: NEGATIVE
LIPASE SERPL-CCNC: 13 U/L (ref 13–60)
LYMPHOCYTES # BLD: 1.2 K/UL (ref 1–5.1)
LYMPHOCYTES NFR BLD: 13.1 %
MCH RBC QN AUTO: 30.3 PG (ref 26–34)
MCHC RBC AUTO-ENTMCNC: 34.5 G/DL (ref 31–36)
MCV RBC AUTO: 87.9 FL (ref 80–100)
METHGB MFR BLDV: 0.3 %
MONOCYTES # BLD: 0.3 K/UL (ref 0–1.3)
MONOCYTES NFR BLD: 2.9 %
NEUTROPHILS # BLD: 7.7 K/UL (ref 1.7–7.7)
NEUTROPHILS NFR BLD: 82.8 %
NITRITE UR QL STRIP.AUTO: NEGATIVE
O2 THERAPY: ABNORMAL
PCO2 BLDV: 51.1 MMHG (ref 40–50)
PERFORMED ON: ABNORMAL
PH BLDV: 7.41 [PH] (ref 7.35–7.45)
PH UR STRIP.AUTO: 6.5 [PH] (ref 5–8)
PLATELET # BLD AUTO: 234 K/UL (ref 135–450)
PMV BLD AUTO: 7.8 FL (ref 5–10.5)
PO2 BLDV: 44.4 MMHG (ref 25–40)
POTASSIUM SERPL-SCNC: 3.8 MMOL/L (ref 3.5–5.1)
PROT SERPL-MCNC: 6.5 G/DL (ref 6.4–8.2)
PROT UR STRIP.AUTO-MCNC: NEGATIVE MG/DL
PROTHROMBIN TIME: 12.4 SEC (ref 11.5–14.8)
RBC # BLD AUTO: 5.17 M/UL (ref 4–5.2)
SAO2 % BLDV: 80 %
SARS-COV-2 RNA RESP QL NAA+PROBE: NOT DETECTED
SODIUM SERPL-SCNC: 136 MMOL/L (ref 136–145)
SP GR UR STRIP.AUTO: 1.02 (ref 1–1.03)
TRIGL SERPL-MCNC: 121 MG/DL (ref 0–150)
TROPONIN, HIGH SENSITIVITY: 15 NG/L (ref 0–14)
TROPONIN, HIGH SENSITIVITY: 15 NG/L (ref 0–14)
TROPONIN, HIGH SENSITIVITY: 16 NG/L (ref 0–14)
TROPONIN, HIGH SENSITIVITY: 16 NG/L (ref 0–14)
TSH SERPL DL<=0.005 MIU/L-ACNC: 1.93 UIU/ML (ref 0.27–4.2)
UA COMPLETE W REFLEX CULTURE PNL UR: NORMAL
UA DIPSTICK W REFLEX MICRO PNL UR: NORMAL
URN SPEC COLLECT METH UR: NORMAL
UROBILINOGEN UR STRIP-ACNC: 0.2 E.U./DL
VLDLC SERPL CALC-MCNC: 24 MG/DL
WBC # BLD AUTO: 9.3 K/UL (ref 4–11)

## 2024-03-06 PROCEDURE — 6360000002 HC RX W HCPCS: Performed by: INTERNAL MEDICINE

## 2024-03-06 PROCEDURE — 96374 THER/PROPH/DIAG INJ IV PUSH: CPT

## 2024-03-06 PROCEDURE — 2700000000 HC OXYGEN THERAPY PER DAY

## 2024-03-06 PROCEDURE — 93306 TTE W/DOPPLER COMPLETE: CPT

## 2024-03-06 PROCEDURE — 83690 ASSAY OF LIPASE: CPT

## 2024-03-06 PROCEDURE — 99222 1ST HOSP IP/OBS MODERATE 55: CPT | Performed by: INTERNAL MEDICINE

## 2024-03-06 PROCEDURE — 6370000000 HC RX 637 (ALT 250 FOR IP): Performed by: INTERNAL MEDICINE

## 2024-03-06 PROCEDURE — 93010 ELECTROCARDIOGRAM REPORT: CPT | Performed by: INTERNAL MEDICINE

## 2024-03-06 PROCEDURE — 3430000000 HC RX DIAGNOSTIC RADIOPHARMACEUTICAL: Performed by: INTERNAL MEDICINE

## 2024-03-06 PROCEDURE — 83880 ASSAY OF NATRIURETIC PEPTIDE: CPT

## 2024-03-06 PROCEDURE — 85025 COMPLETE CBC W/AUTO DIFF WBC: CPT

## 2024-03-06 PROCEDURE — 85610 PROTHROMBIN TIME: CPT

## 2024-03-06 PROCEDURE — 2580000003 HC RX 258: Performed by: INTERNAL MEDICINE

## 2024-03-06 PROCEDURE — 74018 RADEX ABDOMEN 1 VIEW: CPT

## 2024-03-06 PROCEDURE — 80053 COMPREHEN METABOLIC PANEL: CPT

## 2024-03-06 PROCEDURE — 6370000000 HC RX 637 (ALT 250 FOR IP)

## 2024-03-06 PROCEDURE — 6360000002 HC RX W HCPCS: Performed by: STUDENT IN AN ORGANIZED HEALTH CARE EDUCATION/TRAINING PROGRAM

## 2024-03-06 PROCEDURE — 80061 LIPID PANEL: CPT

## 2024-03-06 PROCEDURE — 99285 EMERGENCY DEPT VISIT HI MDM: CPT

## 2024-03-06 PROCEDURE — 2060000000 HC ICU INTERMEDIATE R&B

## 2024-03-06 PROCEDURE — 99223 1ST HOSP IP/OBS HIGH 75: CPT

## 2024-03-06 PROCEDURE — 93005 ELECTROCARDIOGRAM TRACING: CPT | Performed by: STUDENT IN AN ORGANIZED HEALTH CARE EDUCATION/TRAINING PROGRAM

## 2024-03-06 PROCEDURE — 94761 N-INVAS EAR/PLS OXIMETRY MLT: CPT

## 2024-03-06 PROCEDURE — 96375 TX/PRO/DX INJ NEW DRUG ADDON: CPT

## 2024-03-06 PROCEDURE — 84484 ASSAY OF TROPONIN QUANT: CPT

## 2024-03-06 PROCEDURE — 85379 FIBRIN DEGRADATION QUANT: CPT

## 2024-03-06 PROCEDURE — A9502 TC99M TETROFOSMIN: HCPCS | Performed by: INTERNAL MEDICINE

## 2024-03-06 PROCEDURE — 81003 URINALYSIS AUTO W/O SCOPE: CPT

## 2024-03-06 PROCEDURE — 84443 ASSAY THYROID STIM HORMONE: CPT

## 2024-03-06 PROCEDURE — 78452 HT MUSCLE IMAGE SPECT MULT: CPT

## 2024-03-06 PROCEDURE — 93005 ELECTROCARDIOGRAM TRACING: CPT | Performed by: INTERNAL MEDICINE

## 2024-03-06 PROCEDURE — 99223 1ST HOSP IP/OBS HIGH 75: CPT | Performed by: INTERNAL MEDICINE

## 2024-03-06 PROCEDURE — 71045 X-RAY EXAM CHEST 1 VIEW: CPT

## 2024-03-06 PROCEDURE — 2500000003 HC RX 250 WO HCPCS: Performed by: STUDENT IN AN ORGANIZED HEALTH CARE EDUCATION/TRAINING PROGRAM

## 2024-03-06 PROCEDURE — 83605 ASSAY OF LACTIC ACID: CPT

## 2024-03-06 PROCEDURE — 82803 BLOOD GASES ANY COMBINATION: CPT

## 2024-03-06 PROCEDURE — 36415 COLL VENOUS BLD VENIPUNCTURE: CPT

## 2024-03-06 PROCEDURE — 6370000000 HC RX 637 (ALT 250 FOR IP): Performed by: STUDENT IN AN ORGANIZED HEALTH CARE EDUCATION/TRAINING PROGRAM

## 2024-03-06 PROCEDURE — 94640 AIRWAY INHALATION TREATMENT: CPT

## 2024-03-06 PROCEDURE — 87636 SARSCOV2 & INF A&B AMP PRB: CPT

## 2024-03-06 RX ORDER — CITALOPRAM 20 MG/1
40 TABLET ORAL NIGHTLY
Status: DISCONTINUED | OUTPATIENT
Start: 2024-03-06 | End: 2024-03-08 | Stop reason: HOSPADM

## 2024-03-06 RX ORDER — POTASSIUM CHLORIDE 20 MEQ/1
40 TABLET, EXTENDED RELEASE ORAL PRN
Status: DISCONTINUED | OUTPATIENT
Start: 2024-03-06 | End: 2024-03-08 | Stop reason: HOSPADM

## 2024-03-06 RX ORDER — ALBUTEROL SULFATE 90 UG/1
2 AEROSOL, METERED RESPIRATORY (INHALATION)
Status: DISCONTINUED | OUTPATIENT
Start: 2024-03-06 | End: 2024-03-08 | Stop reason: HOSPADM

## 2024-03-06 RX ORDER — ASPIRIN 81 MG/1
81 TABLET, CHEWABLE ORAL DAILY
Status: DISCONTINUED | OUTPATIENT
Start: 2024-03-07 | End: 2024-03-08 | Stop reason: HOSPADM

## 2024-03-06 RX ORDER — SODIUM CHLORIDE 9 MG/ML
INJECTION, SOLUTION INTRAVENOUS PRN
Status: DISCONTINUED | OUTPATIENT
Start: 2024-03-06 | End: 2024-03-08 | Stop reason: HOSPADM

## 2024-03-06 RX ORDER — ENOXAPARIN SODIUM 100 MG/ML
30 INJECTION SUBCUTANEOUS 2 TIMES DAILY
Status: DISCONTINUED | OUTPATIENT
Start: 2024-03-06 | End: 2024-03-08

## 2024-03-06 RX ORDER — M-VIT,TX,IRON,MINS/CALC/FOLIC 27MG-0.4MG
1 TABLET ORAL DAILY
Status: DISCONTINUED | OUTPATIENT
Start: 2024-03-06 | End: 2024-03-08 | Stop reason: HOSPADM

## 2024-03-06 RX ORDER — INSULIN LISPRO 100 [IU]/ML
0-4 INJECTION, SOLUTION INTRAVENOUS; SUBCUTANEOUS NIGHTLY
Status: DISCONTINUED | OUTPATIENT
Start: 2024-03-06 | End: 2024-03-08 | Stop reason: HOSPADM

## 2024-03-06 RX ORDER — ONDANSETRON 4 MG/1
4 TABLET, ORALLY DISINTEGRATING ORAL EVERY 8 HOURS PRN
Status: DISCONTINUED | OUTPATIENT
Start: 2024-03-06 | End: 2024-03-08 | Stop reason: HOSPADM

## 2024-03-06 RX ORDER — GLUCAGON 1 MG/ML
1 KIT INJECTION PRN
Status: DISCONTINUED | OUTPATIENT
Start: 2024-03-06 | End: 2024-03-08 | Stop reason: HOSPADM

## 2024-03-06 RX ORDER — ACETAMINOPHEN 650 MG/1
650 SUPPOSITORY RECTAL EVERY 6 HOURS PRN
Status: DISCONTINUED | OUTPATIENT
Start: 2024-03-06 | End: 2024-03-08 | Stop reason: HOSPADM

## 2024-03-06 RX ORDER — REGADENOSON 0.08 MG/ML
0.4 INJECTION, SOLUTION INTRAVENOUS
Status: COMPLETED | OUTPATIENT
Start: 2024-03-06 | End: 2024-03-07

## 2024-03-06 RX ORDER — BUDESONIDE AND FORMOTEROL FUMARATE DIHYDRATE 160; 4.5 UG/1; UG/1
2 AEROSOL RESPIRATORY (INHALATION)
Status: DISCONTINUED | OUTPATIENT
Start: 2024-03-06 | End: 2024-03-08 | Stop reason: HOSPADM

## 2024-03-06 RX ORDER — ALBUTEROL SULFATE 90 UG/1
2 AEROSOL, METERED RESPIRATORY (INHALATION) EVERY 4 HOURS PRN
Status: DISCONTINUED | OUTPATIENT
Start: 2024-03-06 | End: 2024-03-08 | Stop reason: HOSPADM

## 2024-03-06 RX ORDER — MIRTAZAPINE 15 MG/1
15 TABLET, FILM COATED ORAL NIGHTLY
Status: DISCONTINUED | OUTPATIENT
Start: 2024-03-06 | End: 2024-03-08 | Stop reason: HOSPADM

## 2024-03-06 RX ORDER — NITROGLYCERIN 0.4 MG/1
0.4 TABLET SUBLINGUAL ONCE
Status: COMPLETED | OUTPATIENT
Start: 2024-03-06 | End: 2024-03-06

## 2024-03-06 RX ORDER — ATORVASTATIN CALCIUM 40 MG/1
40 TABLET, FILM COATED ORAL NIGHTLY
Status: DISCONTINUED | OUTPATIENT
Start: 2024-03-06 | End: 2024-03-08 | Stop reason: HOSPADM

## 2024-03-06 RX ORDER — DEXTROSE MONOHYDRATE 100 MG/ML
INJECTION, SOLUTION INTRAVENOUS CONTINUOUS PRN
Status: DISCONTINUED | OUTPATIENT
Start: 2024-03-06 | End: 2024-03-08 | Stop reason: HOSPADM

## 2024-03-06 RX ORDER — CLONAZEPAM 0.5 MG/1
0.5 TABLET ORAL EVERY 12 HOURS PRN
Status: DISCONTINUED | OUTPATIENT
Start: 2024-03-06 | End: 2024-03-08 | Stop reason: HOSPADM

## 2024-03-06 RX ORDER — ONDANSETRON 2 MG/ML
4 INJECTION INTRAMUSCULAR; INTRAVENOUS EVERY 6 HOURS PRN
Status: DISCONTINUED | OUTPATIENT
Start: 2024-03-06 | End: 2024-03-08 | Stop reason: HOSPADM

## 2024-03-06 RX ORDER — POLYETHYLENE GLYCOL 3350 17 G/17G
17 POWDER, FOR SOLUTION ORAL DAILY PRN
Status: DISCONTINUED | OUTPATIENT
Start: 2024-03-06 | End: 2024-03-08 | Stop reason: HOSPADM

## 2024-03-06 RX ORDER — METOPROLOL TARTRATE 1 MG/ML
5 INJECTION, SOLUTION INTRAVENOUS ONCE
Status: COMPLETED | OUTPATIENT
Start: 2024-03-06 | End: 2024-03-06

## 2024-03-06 RX ORDER — FUROSEMIDE 40 MG/1
40 TABLET ORAL DAILY
Status: DISCONTINUED | OUTPATIENT
Start: 2024-03-06 | End: 2024-03-08 | Stop reason: HOSPADM

## 2024-03-06 RX ORDER — SODIUM CHLORIDE 0.9 % (FLUSH) 0.9 %
5-40 SYRINGE (ML) INJECTION EVERY 12 HOURS SCHEDULED
Status: DISCONTINUED | OUTPATIENT
Start: 2024-03-06 | End: 2024-03-08 | Stop reason: HOSPADM

## 2024-03-06 RX ORDER — NICOTINE 21 MG/24HR
1 PATCH, TRANSDERMAL 24 HOURS TRANSDERMAL DAILY PRN
Status: DISCONTINUED | OUTPATIENT
Start: 2024-03-06 | End: 2024-03-08 | Stop reason: HOSPADM

## 2024-03-06 RX ORDER — ALBUTEROL SULFATE 90 UG/1
2 AEROSOL, METERED RESPIRATORY (INHALATION) EVERY 6 HOURS PRN
Status: DISCONTINUED | OUTPATIENT
Start: 2024-03-06 | End: 2024-03-06

## 2024-03-06 RX ORDER — FENTANYL CITRATE 50 UG/ML
50 INJECTION, SOLUTION INTRAMUSCULAR; INTRAVENOUS ONCE
Status: COMPLETED | OUTPATIENT
Start: 2024-03-06 | End: 2024-03-06

## 2024-03-06 RX ORDER — INSULIN LISPRO 100 [IU]/ML
0-4 INJECTION, SOLUTION INTRAVENOUS; SUBCUTANEOUS
Status: DISCONTINUED | OUTPATIENT
Start: 2024-03-06 | End: 2024-03-08 | Stop reason: HOSPADM

## 2024-03-06 RX ORDER — BUPROPION HYDROCHLORIDE 150 MG/1
150 TABLET ORAL DAILY
Status: DISCONTINUED | OUTPATIENT
Start: 2024-03-06 | End: 2024-03-08 | Stop reason: HOSPADM

## 2024-03-06 RX ORDER — OXYCODONE HYDROCHLORIDE AND ACETAMINOPHEN 5; 325 MG/1; MG/1
1 TABLET ORAL EVERY 6 HOURS PRN
Status: DISCONTINUED | OUTPATIENT
Start: 2024-03-06 | End: 2024-03-08 | Stop reason: HOSPADM

## 2024-03-06 RX ORDER — GABAPENTIN 100 MG/1
100 CAPSULE ORAL 3 TIMES DAILY
Status: DISCONTINUED | OUTPATIENT
Start: 2024-03-06 | End: 2024-03-08 | Stop reason: HOSPADM

## 2024-03-06 RX ORDER — POTASSIUM CHLORIDE 7.45 MG/ML
10 INJECTION INTRAVENOUS PRN
Status: DISCONTINUED | OUTPATIENT
Start: 2024-03-06 | End: 2024-03-08 | Stop reason: HOSPADM

## 2024-03-06 RX ORDER — METOPROLOL TARTRATE 50 MG/1
50 TABLET, FILM COATED ORAL ONCE
Status: DISCONTINUED | OUTPATIENT
Start: 2024-03-06 | End: 2024-03-06

## 2024-03-06 RX ORDER — SODIUM CHLORIDE 0.9 % (FLUSH) 0.9 %
5-40 SYRINGE (ML) INJECTION PRN
Status: DISCONTINUED | OUTPATIENT
Start: 2024-03-06 | End: 2024-03-08 | Stop reason: HOSPADM

## 2024-03-06 RX ORDER — MAGNESIUM SULFATE IN WATER 40 MG/ML
2000 INJECTION, SOLUTION INTRAVENOUS PRN
Status: DISCONTINUED | OUTPATIENT
Start: 2024-03-06 | End: 2024-03-08 | Stop reason: HOSPADM

## 2024-03-06 RX ORDER — ACETAMINOPHEN 325 MG/1
650 TABLET ORAL EVERY 6 HOURS PRN
Status: DISCONTINUED | OUTPATIENT
Start: 2024-03-06 | End: 2024-03-08 | Stop reason: HOSPADM

## 2024-03-06 RX ADMIN — CITALOPRAM HYDROBROMIDE 40 MG: 20 TABLET ORAL at 21:35

## 2024-03-06 RX ADMIN — I-VITE, TAB 1000-60-2MG (60/BT) 1 TABLET: TAB at 16:04

## 2024-03-06 RX ADMIN — TIOTROPIUM BROMIDE INHALATION SPRAY 2 PUFF: 3.12 SPRAY, METERED RESPIRATORY (INHALATION) at 11:51

## 2024-03-06 RX ADMIN — TETROFOSMIN 32 MILLICURIE: 1.38 INJECTION, POWDER, LYOPHILIZED, FOR SOLUTION INTRAVENOUS at 13:55

## 2024-03-06 RX ADMIN — Medication 2 PUFF: at 19:53

## 2024-03-06 RX ADMIN — GABAPENTIN 100 MG: 100 CAPSULE ORAL at 21:31

## 2024-03-06 RX ADMIN — Medication 10 ML: at 21:29

## 2024-03-06 RX ADMIN — GABAPENTIN 100 MG: 100 CAPSULE ORAL at 16:04

## 2024-03-06 RX ADMIN — FUROSEMIDE 40 MG: 40 TABLET ORAL at 16:07

## 2024-03-06 RX ADMIN — METOPROLOL TARTRATE 5 MG: 5 INJECTION INTRAVENOUS at 06:51

## 2024-03-06 RX ADMIN — FENTANYL CITRATE 50 MCG: 50 INJECTION, SOLUTION INTRAMUSCULAR; INTRAVENOUS at 04:59

## 2024-03-06 RX ADMIN — DESMOPRESSIN ACETATE 40 MG: 0.2 TABLET ORAL at 21:31

## 2024-03-06 RX ADMIN — ONDANSETRON 4 MG: 4 TABLET, ORALLY DISINTEGRATING ORAL at 10:13

## 2024-03-06 RX ADMIN — ENOXAPARIN SODIUM 30 MG: 100 INJECTION SUBCUTANEOUS at 21:30

## 2024-03-06 RX ADMIN — Medication 2 PUFF: at 11:51

## 2024-03-06 RX ADMIN — BUPROPION HYDROCHLORIDE 150 MG: 150 TABLET, EXTENDED RELEASE ORAL at 16:04

## 2024-03-06 RX ADMIN — NITROGLYCERIN 0.4 MG: 0.4 TABLET SUBLINGUAL at 05:00

## 2024-03-06 RX ADMIN — MIRTAZAPINE 15 MG: 15 TABLET, FILM COATED ORAL at 21:32

## 2024-03-06 ASSESSMENT — PAIN DESCRIPTION - ORIENTATION
ORIENTATION: MID
ORIENTATION: MID

## 2024-03-06 ASSESSMENT — PAIN DESCRIPTION - LOCATION
LOCATION: CHEST
LOCATION: CHEST

## 2024-03-06 ASSESSMENT — PAIN - FUNCTIONAL ASSESSMENT
PAIN_FUNCTIONAL_ASSESSMENT: ACTIVITIES ARE NOT PREVENTED
PAIN_FUNCTIONAL_ASSESSMENT: 0-10
PAIN_FUNCTIONAL_ASSESSMENT: ACTIVITIES ARE NOT PREVENTED

## 2024-03-06 ASSESSMENT — HEART SCORE
ECG: NON-SPECIFC REPOLARIZATION DISTURBANCE/LBTB/PM
ECG: 1

## 2024-03-06 ASSESSMENT — PAIN SCALES - GENERAL
PAINLEVEL_OUTOF10: 4
PAINLEVEL_OUTOF10: 4
PAINLEVEL_OUTOF10: 0

## 2024-03-06 ASSESSMENT — PAIN DESCRIPTION - PAIN TYPE: TYPE: ACUTE PAIN

## 2024-03-06 ASSESSMENT — PAIN DESCRIPTION - DIRECTION: RADIATING_TOWARDS: NECK

## 2024-03-06 ASSESSMENT — LIFESTYLE VARIABLES: HOW OFTEN DO YOU HAVE A DRINK CONTAINING ALCOHOL: NEVER

## 2024-03-06 ASSESSMENT — PAIN DESCRIPTION - DESCRIPTORS
DESCRIPTORS: PRESSURE
DESCRIPTORS: ACHING;PRESSURE

## 2024-03-06 NOTE — PROGRESS NOTES
03/06/24 1317   Encounter Summary   Encounter Overview/Reason  Initial Encounter;Volunteer Encounter   Service Provided For: Patient;Family   Last Encounter  03/06/24  (Pastoral Care Volunteer visit)

## 2024-03-06 NOTE — CONSULTS
[facility]   CONSULTATION  [phone]        Reason for Consultation/Chief Complaint: \"I have been having  shortness of breath and chest pressure in last 24 hrs.\"    History of Present Illness:  Samina Sepulveda is a 69 y.o. patient who presented to the hospital with complaints of shortness of breath and chest pressure.  She ate her dinner yesterday and then started vomiting. No blood in vomitus. No preceding abdominal pain or current abdominal pain.Says \" I vomited all night long\".  She feels chest pressure across anterior chest in center that radiated to rt side of neck started at the same time as above symptoms.Symptoms were worse with deep breath.  She had severe pain at the onset but is better now. No prior heart disease but has COPD 60pack yr smoker. Currently 2 ppd. No alcohol. No prior heart disease.  She has chronic mild swelling of both legs Lt >Rt.  .   Co morbidities  HBP DM2 HLD  Liver cirrhosis Emphysema 90 pack yr smoker I have been asked to provide consultation regarding further management and testing.      Past Medical History:   has a past medical history of Anxiety, Arthritis, Depression, Disc degeneration, Frequent UTI, Hyperlipidemia, Hypertension, Liver cirrhosis (HCC), and Type 2 diabetes mellitus without complication, without long-term current use of insulin (HCC).    Surgical History:   has a past surgical history that includes Hysterectomy (1980); Cholecystectomy; Appendectomy; Tubal ligation (1984); Rotator cuff repair; Carpal tunnel release; Ulnar tunnel release; Foot surgery (Right, 4/4/12); Foot surgery (Right, 06/12/13); Upper gastrointestinal endoscopy (N/A, 12/5/2019); Colonoscopy; and Cystoscopy (N/A, 1/22/2020).     Social History:   reports that she has been smoking cigarettes. She has a 90.0 pack-year smoking history. She has never used smokeless tobacco. She reports that she does not drink alcohol and does not use drugs.     Family History:  family history includes  Cancer in her brother, father, and sister; Depression in her sister; Diabetes in her father; High Blood Pressure in her sister and sister.     Home Medications:  Were reviewed and are listed in nursing record. and/or listed below  Prior to Admission medications    Medication Sig Start Date End Date Taking? Authorizing Provider   clonazePAM (KLONOPIN) 0.5 MG tablet TAKE ONE (1) TABLET TWICE DAILY AS NEEDED FOR ANXIETY FOR UP TO 30 DAYS 2/12/24 3/13/24  Buck Tavares MD   buPROPion (WELLBUTRIN XL) 150 MG extended release tablet TAKE ONE (1) TABLET IN THE MORNING 2/7/24   Jose Martin Beard APRN - CNP   gabapentin (NEURONTIN) 100 MG capsule TAKE (1) CAPSULE THREE TIMES DAILY. 1/8/24 3/6/24  Buck Tavares MD   Continuous Blood Gluc Sensor (FREESTYLE KJ 2 SENSOR) MISC USE TO CHECK BLOOD SUGAR AS DIRECTED. CHANGE EVERY 14 DAYS 1/8/24   Buck Tavares MD   furosemide (LASIX) 40 MG tablet TAKE ONE (1) TABLET BY MOUTH DAILY 1/8/24   Buck Tavares MD   atorvastatin (LIPITOR) 20 MG tablet TAKE ONE (1) TABLET NIGHTLY 12/27/23   Buck Tavares MD   citalopram (CELEXA) 40 MG tablet TAKE ONE (1) TABLET BY MOUTH NIGHTLY 12/4/23   Buck Tavares MD   Semaglutide,0.25 or 0.5MG/DOS, (OZEMPIC, 0.25 OR 0.5 MG/DOSE,) 2 MG/3ML SOPN Inject 0.25 mg into the skin once a week 11/9/23   Buck Tavares MD   fluticasone-umeclidin-vilant (TRELEGY ELLIPTA) 200-62.5-25 MCG/ACT AEPB inhaler Inhale 1 puff into the lungs daily 8/28/23   Jose Martin Beard APRN - CNP   Continuous Blood Gluc  (FREESTYLE KJ READER) JEN 1 each by Does not apply route daily 7/19/23   Jose Martin Beard APRN - CNP   Continuous Blood Gluc  (FREESTYLE KJ 14 DAY READER) JEN 1 each by Does not apply route every 14 days 7/19/23   Jose Martin Beard APRN - CNP   albuterol sulfate HFA (PROVENTIL;VENTOLIN;PROAIR) 108 (90 Base) MCG/ACT inhaler Inhale 2 puffs into the lungs every 6 hours as needed for Wheezing 7/3/23   Buck Tavares MD

## 2024-03-06 NOTE — CONSULTS
Reason for referral and CC: sob, hypoxia    HISTORY OF PRESENT ILLNESS: 69-year-old female with a history of cirrhosis and diabetes and COPD presented with abdominal pain and nausea vomiting since yesterday.  This is associated with retrosternal chest pressure.  She ruled out for acute MI.  Today the chest pressure is gone.  Nausea vomiting is resolved.  Abdominal pain is better.  She relates that she was significantly short of breath and just could not breathe very well yesterday and this is also improved some.  However she is still hypoxic on 2 L of oxygen.  She denies any new cough or wheezing.      Past Medical History:   Diagnosis Date    Anxiety     Arthritis     Depression     Disc degeneration 2014    Frequent UTI     Hyperlipidemia     Hypertension     Liver cirrhosis (HCC) 12/18/2019    Type 2 diabetes mellitus without complication, without long-term current use of insulin (HCC)      Past Surgical History:   Procedure Laterality Date    APPENDECTOMY      was 14 yrs old    CARPAL TUNNEL RELEASE      CHOLECYSTECTOMY      COLONOSCOPY      CYSTOSCOPY N/A 1/22/2020    CYSTOSCOPY, URETHRAL DILATION performed by Williams Dobson MD at [facility] OR    FOOT SURGERY Right 4/4/12    FOOT SURGERY Right 06/12/13    FIRST METATARSAL PHALANGEAL JOINT IMPLANTATION RIGHT FOOT    HYSTERECTOMY (CERVIX STATUS UNKNOWN)  1980    complete and 1982;endomwtriosis;    ROTATOR CUFF REPAIR      1988    TUBAL LIGATION  1984    ULNAR TUNNEL RELEASE      UPPER GASTROINTESTINAL ENDOSCOPY N/A 12/5/2019    EGD (10:00) **FIBROSCAN TOO** performed by Roberto Whiting MD at [facility] ENDOSCOPY     Family History  family history includes Cancer in her brother, father, and sister; Depression in her sister; Diabetes in her father; High Blood Pressure in her sister and sister.    Social History:  reports that she has been smoking cigarettes. She has a 90.0 pack-year smoking history. She has never used smokeless tobacco.   reports no history of

## 2024-03-06 NOTE — H&P
for morbidity and mortality requiring testing and treatment.    DVT Prophylaxis: Lovenox   Diet: Diet NPO Exceptions are: Sips of Water with Meds, Sips of Clear Liquids  ADULT DIET; Regular  Code Status: Full Code    Octavia Guo PA-C  03/06/24

## 2024-03-06 NOTE — PROGRESS NOTES
RT Inhaler-Nebulizer Bronchodilator Protocol Note    There is a bronchodilator order in the chart from a provider indicating to follow the RT Bronchodilator Protocol and there is an “Initiate RT Inhaler-Nebulizer Bronchodilator Protocol” order as well (see protocol at bottom of note).    CXR Findings:  XR CHEST PORTABLE    Result Date: 3/6/2024  Cardiomegaly with pulmonary vascular congestion and mild pulmonary edema.       The findings from the last RT Protocol Assessment were as follows:   History Pulmonary Disease: (P) Chronic pulmonary disease  Respiratory Pattern: (P) Dyspnea on exertion or RR 21-25 bpm  Breath Sounds: (P) Clear breath sounds  Cough: (P) Strong, spontaneous, non-productive  Indication for Bronchodilator Therapy: (P) Decreased or absent breath sounds  Bronchodilator Assessment Score: (P) 4    Aerosolized bronchodilator medication orders have been revised according to the RT Inhaler-Nebulizer Bronchodilator Protocol below.    Respiratory Therapist to perform RT Therapy Protocol Assessment initially then follow the protocol.  Repeat RT Therapy Protocol Assessment PRN for score 0-3 or on second treatment, BID, and PRN for scores above 3.    No Indications - adjust the frequency to every 6 hours PRN wheezing or bronchospasm, if no treatments needed after 48 hours then discontinue using Per Protocol order mode.     If indication present, adjust the RT bronchodilator orders based on the Bronchodilator Assessment Score as indicated below.  Use Inhaler orders unless patient has one or more of the following: on home nebulizer, not able to hold breath for 10 seconds, is not alert and oriented, cannot activate and use MDI correctly, or respiratory rate 25 breaths per minute or more, then use the equivalent nebulizer order(s) with same Frequency and PRN reasons based on the score.  If a patient is on this medication at home then do not decrease Frequency below that used at home.    0-3 - enter or revise RT  bronchodilator order(s) to equivalent RT Bronchodilator order with Frequency of every 4 hours PRN for wheezing or increased work of breathing using Per Protocol order mode.        4-6 - enter or revise RT Bronchodilator order(s) to two equivalent RT bronchodilator orders with one order with BID Frequency and one order with Frequency of every 4 hours PRN wheezing or increased work of breathing using Per Protocol order mode.        7-10 - enter or revise RT Bronchodilator order(s) to two equivalent RT bronchodilator orders with one order with TID Frequency and one order with Frequency of every 4 hours PRN wheezing or increased work of breathing using Per Protocol order mode.       11-13 - enter or revise RT Bronchodilator order(s) to one equivalent RT bronchodilator order with QID Frequency and an Albuterol order with Frequency of every 4 hours PRN wheezing or increased work of breathing using Per Protocol order mode.      Greater than 13 - enter or revise RT Bronchodilator order(s) to one equivalent RT bronchodilator order with every 4 hours Frequency and an Albuterol order with Frequency of every 2 hours PRN wheezing or increased work of breathing using Per Protocol order mode.       Electronically signed by Roberto Padilla RCP on 3/6/2024 at 11:57 AM

## 2024-03-06 NOTE — PROGRESS NOTES
Patient arrived to room 314-2 from ED. Patient was oriented to room. Admission questions, assessment, and medication reconciliation completed. Patient is A&O x4. Vitals are stable and Patient denies further needs. Call light within reach.

## 2024-03-06 NOTE — ED PROVIDER NOTES
[facility] ED     EMERGENCY DEPARTMENT ENCOUNTER         Pt Name: Samina Sepulveda   MRN: [id number]   Birthdate 1955   Date of evaluation: 3/6/2024   Provider: Lee Armenta MD   PCP: Buck Tavares MD   Note Started: 4:20 AM EST 3/6/24       Chief Complaint     Chest Pain (Midsternal chest pain since 7pm. NV. Given 324 Aspirin and 4mg Zofran en route. )      History of Present Illness     Samina Sepulveda is a 69 y.o. female who presents with chest pain/pressure since around 1900 hrs. tonight.  There was some radiation to the jaw and the patient experienced nausea and vomiting.  Her condition persisted and she therefore called for EMS who administered aspirin and Zofran found her field EKG to be nonischemic condition unchanged and round otherwise hemodynamically stable.  Patient is recovering from a viral illness roughly 1 month ago influenza.  She has otherwise been in baseline health.  She reports she had a normal stress test several years ago.      I have reviewed the nursing notes and agree unless otherwise noted.    Review of Systems     Positives and pertinent negatives as per HPI.    Past Medical, Surgical, Family, and Social History     She has a past medical history of Anxiety, Arthritis, Depression, Disc degeneration, Frequent UTI, Hyperlipidemia, Hypertension, Liver cirrhosis (HCC), and Type 2 diabetes mellitus without complication, without long-term current use of insulin (HCC).  She has a past surgical history that includes Hysterectomy (1980); Cholecystectomy; Appendectomy; Tubal ligation (1984); Rotator cuff repair; Carpal tunnel release; Ulnar tunnel release; Foot surgery (Right, 4/4/12); Foot surgery (Right, 06/12/13); Upper gastrointestinal endoscopy (N/A, 12/5/2019); Colonoscopy; and Cystoscopy (N/A, 1/22/2020).  Her family history includes Cancer in her brother, father, and sister; Depression in her sister; Diabetes in her father; High Blood Pressure in her sister and    0454 HCO3, Venous(!): 31.7  Patient is a compensated respiratory acidosis likely in the context of her COPD. [NG]   0454 INR: 0.92 [NG]   0454 WBC: 9.3 [NG]   0454 Hemoglobin Quant: 15.7 [NG]   0454 Hematocrit: 45.4 [NG]   0454 Platelet Count: 234 [NG]   0526 Troponin, High Sensitivity(!): 16 [NG]   0527 Pro-BNP(!): 200  Mildly ill troponin nonischemic EKG will trend.  BNP reassuring.  Remainder of labs thus far unremarkable. [NG]   0613 Troponin, High Sensitivity(!): 16 [NG]   0640 The patient is reassessed she is resting comfortably.  She reports the nitroglycerin in particular significantly improved her chest pain and associated symptoms.  Her repeat troponin is stable at 16 EKG remains nonischemic however given her stated history risk factors and tonight's events I calculate a heart score of 8.  Given this I propose admission for stress testing.  The patient has not had a stress test since 2017 at which time it was normal.  The patient is agreeable to admission and this will conclude her ED course remainder of workup has been reassuring.  At the time of admission the patient is currently pain-free. [NG]   0646 The patient is hypertensive well above her historic trend which may also be a marker of a cardiac event and while there is no clinical evidence of significant COPD exacerbation though she has a history we will provide a single dose of metoprolol to assess for improvement in this regard. [NG]      ED Course User Index  [NG] Lee Armenta MD       Additional Reassessment    Is this patient to be included in the SEP-1 core measure? No Exclusion criteria - the patient is NOT to be included for SEP-1 Core Measure due to: Infection is not suspected    Medical Decision Making  Admit for chest pain heart score 8 no recent cardiac risk stratification with multiple high risk comorbidities.    Problems Addressed:  Chest pain, unspecified type: acute illness or injury    Amount and/or Complexity of Data  Reviewed  Labs: ordered. Decision-making details documented in ED Course.  Radiology: ordered. Decision-making details documented in ED Course.  ECG/medicine tests: ordered. Decision-making details documented in ED Course.    Risk  Prescription drug management.  Decision regarding hospitalization.          The patient was given the followingmedications:  Orders Placed This Encounter   Medications    nitroGLYCERIN (NITROSTAT) SL tablet 0.4 mg    fentaNYL (SUBLIMAZE) injection 50 mcg    DISCONTD: metoprolol tartrate (LOPRESSOR) tablet 50 mg    metoprolol (LOPRESSOR) injection 5 mg       CONSULTS:  None      CRITICAL CARE TIME   Total Critical Care time was 0 minutes, excluding separately reportable procedures in the context of the following condition na.  There was a high probability of clinically significant/life threatening deterioration in the patient's condition which required my urgent intervention.    Clinical Impression     1. Chest pain, unspecified type        Disposition     PATIENT REFERRED TO:  No follow-up provider specified.    DISCHARGE MEDICATIONS:  New Prescriptions    No medications on file       DISPOSITION Decision To Admit 03/06/2024 06:36:57 AM      Lee Armenta MD (electronically signed)  Attending Emergency Physician    Please note this documentation has been produced using speech recognition software and may contain errors related to that system including errors in grammar, punctuation, and spelling, as well as words and phrases that may be inappropriate.  Efforts were made to edit the dictations.         Lee Armenta MD  03/06/24 0643       Lee Armenta MD  03/06/24 0647

## 2024-03-06 NOTE — FLOWSHEET NOTE
03/06/24 1117   Vital Signs   Temp 98.1 °F (36.7 °C)   Temp Source Oral   Pulse 81   Heart Rate Source Monitor   Respirations 16   BP (!) 146/88   MAP (Calculated) 107   BP Location Right lower arm   BP Method Automatic   Patient Position Semi fowlers   Oxygen Therapy   SpO2 93 %   O2 Device Nasal cannula   O2 Flow Rate (L/min) 2 L/min     Vitals remain stable. Patient is A&O x4 and denies further needs. Call light within reach.

## 2024-03-06 NOTE — PROGRESS NOTES
Patient admitted to room 314/2 from ED. Patient oriented to room, call light, bed rails, phone, lights and bathroom. Patient instructed about the schedule of the day including: vital sign frequency, lab draws, possible tests, frequency of MD and staff rounds, daily weights, I &O's and prescribed diet.  Telemetry box in place, patient aware of placement and reason. Bed locked, in lowest position, side rails up 2/4, call light within reach.        Recliner Assessment  Patient is able to demonstrate the ability to move from a reclining position to an upright position within the recliner.       4 Eyes Skin Assessment     NAME:  Samina HECK Derick  YOB: 1955  MEDICAL RECORD NUMBER:  [id number]    The patient is being assessed for  Admission    I agree that at least one RN has performed a thorough Head to Toe Skin Assessment on the patient. ALL assessment sites listed below have been assessed.      Areas assessed by both nurses:    Head, Face, Ears, Shoulders, Back, Chest, Arms, Elbows, Hands, Sacrum. Buttock, Coccyx, Ischium, Legs. Feet and Heels, and Under Medical Devices         Does the Patient have a Wound? No noted wound(s)       Khadar Prevention initiated by RN: No  Wound Care Orders initiated by RN: No    Pressure Injury (Stage 3,4, Unstageable, DTI, NWPT, and Complex wounds) if present, place Wound referral order by RN under : No    New Ostomies, if present place, Ostomy referral order under : No     Nurse 1 eSignature: Electronically signed by Nohemy Reyes RN on 3/6/24 at 5:24 PM EST    **SHARE this note so that the co-signing nurse can place an eSignature**    Nurse 2 eSignature: Electronically signed by Izabella Kunz RN on 3/6/24 at 5:25 PM EST

## 2024-03-06 NOTE — FLOWSHEET NOTE
03/06/24 1603   Vital Signs   Temp 98.2 °F (36.8 °C)   Temp Source Oral   Pulse 84   Heart Rate Source Monitor   Respirations 16   /82   MAP (Calculated) 99   BP Location Right lower arm   BP Method Automatic   Patient Position Semi fowlers   Oxygen Therapy   SpO2 96 %   O2 Device Nasal cannula   O2 Flow Rate (L/min) 3 L/min     Vitals are stable. Patient denies further needs. Call light within reach.

## 2024-03-06 NOTE — ED NOTES
No distress.  NSR 80.  Wearing o2 2l nc - does not normally wear oxygen but noted that sat dropped to 88% with SOB here in ED / previous shift.    Oxygen off - o2 sat drops to 87% on room air.  Denies SOB while at rest at this time.  Oxygen turned back on at 2 l nc.     Chest pressure 4/10 continues.   Repeat Troponin done at this time.    Admitting orders released.  Denies cardiac hx or coronary angiogram in past.   Awaiting room assignment - border patient in ED.    Patient informed NPO status pending Cardiology   Consult.  Will place consult now.    Resting quietly no distress noted.

## 2024-03-06 NOTE — FLOWSHEET NOTE
03/06/24 0845   Vital Signs   Temp 98 °F (36.7 °C)   Temp Source Oral   Pulse 84   Heart Rate Source Monitor   Respirations 16   BP (!) 158/103   MAP (Calculated) 121   BP Location Right upper arm   BP Method Automatic   Patient Position Sitting   Oxygen Therapy   SpO2 96 %   O2 Device Nasal cannula   O2 Flow Rate (L/min) 2 L/min   Height and Weight   Weight - Scale 100 kg (220 lb 7 oz)   BMI (Calculated) 0     Admission assessment completed. See flow sheet. Vitals stable. Patient states Chest pain remains at a 4. Patietn denies further needs. Call light within reach.

## 2024-03-06 NOTE — ED NOTES
Dr Mera reviewing repeat EKG now.  Cardiology Consult paged.    Report to floor BRITTANIE Lechuga.

## 2024-03-06 NOTE — PROGRESS NOTES
VTE Prophylaxis Monitoring    Recent Labs     03/06/24  0424   CREATININE <0.5*     Recent Labs     03/06/24  0424   HGB 15.7   HCT 45.4      INR 0.92     Estimated Creatinine Clearance: 131 mL/min (based on SCr of 0.5 mg/dL).  Wt Readings from Last 1 Encounters:   03/06/24 103.8 kg (228 lb 12.7 oz)       The guidance below is to provide initial recommendations for dosing. If recommended dose does not align well with patient's current clinical picture, communications with the care team will occur to determine most appropriate medication and dose.    Do not exceed enoxaparin 40mg daily or UFH 5000 units SUBQ TID in patients with epidurals, lumbar drains, or external ventricular drains    TABLE 1.  ENOXAPARIN ROUTINE PROPHYLAXIS DOSING (Medically ill, routine surgery)   Patient Weight (kg)     50.9 and below 51 - 100.9 101 - 150.9 151 - 174.9 175 or greater         Estimated CrCl  (ml/min) 30 or greater   30 mg SUBQ daily   40 mg SUBQ daily 30 mg SUBQ BID  40 mg SUBQ BID 60mg SUBQ BID      15-29 UFH 5000 units SUBQ BID   30 mg SUBQ daily 30 mg SUBQ daily 40 mg SUBQ daily   60 mg SUBQ daily      Less than 15 or Dialysis UFH 5000 units SUBQ BID   UFH 5000 units SUBQ TID UFH 7500 units SUBQ TID       Patricia Storey RPH 3/6/2024 8:11 AM

## 2024-03-07 ENCOUNTER — APPOINTMENT (OUTPATIENT)
Dept: NUCLEAR MEDICINE | Age: 69
End: 2024-03-07
Payer: MEDICARE

## 2024-03-07 LAB
ANION GAP SERPL CALCULATED.3IONS-SCNC: 8 MMOL/L (ref 3–16)
BUN SERPL-MCNC: 8 MG/DL (ref 7–20)
CALCIUM SERPL-MCNC: 9.5 MG/DL (ref 8.3–10.6)
CHLORIDE SERPL-SCNC: 99 MMOL/L (ref 99–110)
CO2 SERPL-SCNC: 37 MMOL/L (ref 21–32)
CREAT SERPL-MCNC: <0.5 MG/DL (ref 0.6–1.2)
DEPRECATED RDW RBC AUTO: 14.4 % (ref 12.4–15.4)
GFR SERPLBLD CREATININE-BSD FMLA CKD-EPI: >60 ML/MIN/{1.73_M2}
GLUCOSE BLD-MCNC: 119 MG/DL (ref 70–99)
GLUCOSE BLD-MCNC: 127 MG/DL (ref 70–99)
GLUCOSE BLD-MCNC: 129 MG/DL (ref 70–99)
GLUCOSE BLD-MCNC: 167 MG/DL (ref 70–99)
GLUCOSE SERPL-MCNC: 125 MG/DL (ref 70–99)
HCT VFR BLD AUTO: 41.6 % (ref 36–48)
HGB BLD-MCNC: 14.6 G/DL (ref 12–16)
MAGNESIUM SERPL-MCNC: 1.8 MG/DL (ref 1.8–2.4)
MCH RBC QN AUTO: 31 PG (ref 26–34)
MCHC RBC AUTO-ENTMCNC: 35 G/DL (ref 31–36)
MCV RBC AUTO: 88.7 FL (ref 80–100)
PERFORMED ON: ABNORMAL
PLATELET # BLD AUTO: 191 K/UL (ref 135–450)
PMV BLD AUTO: 7.9 FL (ref 5–10.5)
POTASSIUM SERPL-SCNC: 3.3 MMOL/L (ref 3.5–5.1)
RBC # BLD AUTO: 4.7 M/UL (ref 4–5.2)
SODIUM SERPL-SCNC: 144 MMOL/L (ref 136–145)
WBC # BLD AUTO: 8.5 K/UL (ref 4–11)

## 2024-03-07 PROCEDURE — 6360000002 HC RX W HCPCS: Performed by: INTERNAL MEDICINE

## 2024-03-07 PROCEDURE — 6370000000 HC RX 637 (ALT 250 FOR IP): Performed by: INTERNAL MEDICINE

## 2024-03-07 PROCEDURE — A9502 TC99M TETROFOSMIN: HCPCS | Performed by: INTERNAL MEDICINE

## 2024-03-07 PROCEDURE — 94761 N-INVAS EAR/PLS OXIMETRY MLT: CPT

## 2024-03-07 PROCEDURE — 99233 SBSQ HOSP IP/OBS HIGH 50: CPT | Performed by: INTERNAL MEDICINE

## 2024-03-07 PROCEDURE — 2060000000 HC ICU INTERMEDIATE R&B

## 2024-03-07 PROCEDURE — 85027 COMPLETE CBC AUTOMATED: CPT

## 2024-03-07 PROCEDURE — 99232 SBSQ HOSP IP/OBS MODERATE 35: CPT | Performed by: INTERNAL MEDICINE

## 2024-03-07 PROCEDURE — 6370000000 HC RX 637 (ALT 250 FOR IP)

## 2024-03-07 PROCEDURE — 80048 BASIC METABOLIC PNL TOTAL CA: CPT

## 2024-03-07 PROCEDURE — 36415 COLL VENOUS BLD VENIPUNCTURE: CPT

## 2024-03-07 PROCEDURE — 99406 BEHAV CHNG SMOKING 3-10 MIN: CPT | Performed by: INTERNAL MEDICINE

## 2024-03-07 PROCEDURE — 3430000000 HC RX DIAGNOSTIC RADIOPHARMACEUTICAL: Performed by: INTERNAL MEDICINE

## 2024-03-07 PROCEDURE — 2580000003 HC RX 258: Performed by: INTERNAL MEDICINE

## 2024-03-07 PROCEDURE — 2700000000 HC OXYGEN THERAPY PER DAY

## 2024-03-07 PROCEDURE — 94640 AIRWAY INHALATION TREATMENT: CPT

## 2024-03-07 PROCEDURE — 93017 CV STRESS TEST TRACING ONLY: CPT

## 2024-03-07 PROCEDURE — 83735 ASSAY OF MAGNESIUM: CPT

## 2024-03-07 RX ORDER — SPIRONOLACTONE 25 MG/1
25 TABLET ORAL DAILY
Status: DISCONTINUED | OUTPATIENT
Start: 2024-03-07 | End: 2024-03-08 | Stop reason: HOSPADM

## 2024-03-07 RX ADMIN — SPIRONOLACTONE 25 MG: 25 TABLET ORAL at 17:23

## 2024-03-07 RX ADMIN — Medication 2 PUFF: at 08:11

## 2024-03-07 RX ADMIN — MIRTAZAPINE 15 MG: 15 TABLET, FILM COATED ORAL at 20:23

## 2024-03-07 RX ADMIN — TIOTROPIUM BROMIDE INHALATION SPRAY 2 PUFF: 3.12 SPRAY, METERED RESPIRATORY (INHALATION) at 08:12

## 2024-03-07 RX ADMIN — ACETAMINOPHEN 650 MG: 325 TABLET ORAL at 20:23

## 2024-03-07 RX ADMIN — I-VITE, TAB 1000-60-2MG (60/BT) 1 TABLET: TAB at 08:47

## 2024-03-07 RX ADMIN — CITALOPRAM HYDROBROMIDE 40 MG: 20 TABLET ORAL at 20:23

## 2024-03-07 RX ADMIN — Medication 10 ML: at 08:48

## 2024-03-07 RX ADMIN — GABAPENTIN 100 MG: 100 CAPSULE ORAL at 20:23

## 2024-03-07 RX ADMIN — REGADENOSON 0.4 MG: 0.08 INJECTION, SOLUTION INTRAVENOUS at 14:15

## 2024-03-07 RX ADMIN — ENOXAPARIN SODIUM 30 MG: 100 INJECTION SUBCUTANEOUS at 20:23

## 2024-03-07 RX ADMIN — GABAPENTIN 100 MG: 100 CAPSULE ORAL at 17:23

## 2024-03-07 RX ADMIN — Medication 2 PUFF: at 20:38

## 2024-03-07 RX ADMIN — POTASSIUM CHLORIDE 40 MEQ: 1500 TABLET, EXTENDED RELEASE ORAL at 07:22

## 2024-03-07 RX ADMIN — GABAPENTIN 100 MG: 100 CAPSULE ORAL at 08:47

## 2024-03-07 RX ADMIN — DESMOPRESSIN ACETATE 40 MG: 0.2 TABLET ORAL at 20:23

## 2024-03-07 RX ADMIN — BUPROPION HYDROCHLORIDE 150 MG: 150 TABLET, EXTENDED RELEASE ORAL at 08:47

## 2024-03-07 RX ADMIN — ACETAMINOPHEN 650 MG: 325 TABLET ORAL at 07:25

## 2024-03-07 RX ADMIN — FUROSEMIDE 40 MG: 40 TABLET ORAL at 08:47

## 2024-03-07 RX ADMIN — TETROFOSMIN 31.1 MILLICURIE: 1.38 INJECTION, POWDER, LYOPHILIZED, FOR SOLUTION INTRAVENOUS at 14:15

## 2024-03-07 RX ADMIN — ASPIRIN 81 MG: 81 TABLET, CHEWABLE ORAL at 08:47

## 2024-03-07 RX ADMIN — Medication 10 ML: at 20:24

## 2024-03-07 RX ADMIN — Medication 2 PUFF: at 20:39

## 2024-03-07 ASSESSMENT — PAIN SCALES - GENERAL
PAINLEVEL_OUTOF10: 4
PAINLEVEL_OUTOF10: 4

## 2024-03-07 ASSESSMENT — PAIN DESCRIPTION - DESCRIPTORS
DESCRIPTORS: ACHING
DESCRIPTORS: ACHING

## 2024-03-07 ASSESSMENT — PAIN DESCRIPTION - ORIENTATION
ORIENTATION: ANTERIOR
ORIENTATION: ANTERIOR

## 2024-03-07 ASSESSMENT — PAIN DESCRIPTION - LOCATION
LOCATION: HEAD
LOCATION: HEAD

## 2024-03-07 ASSESSMENT — PAIN - FUNCTIONAL ASSESSMENT: PAIN_FUNCTIONAL_ASSESSMENT: ACTIVITIES ARE NOT PREVENTED

## 2024-03-07 NOTE — PROGRESS NOTES
O2 Sat at rest on room air is 93 %.  O2 Sat with activity on room air is 86 %.  O2 Sat at rest with oxygen @  2 lpm is 95%.  O2 Sat with activity with oxygen @ 2 lpm is 92%.

## 2024-03-07 NOTE — PROGRESS NOTES
[facility]    Daily Progress Note for 3/7/2024 6:36 PM /0314-02  Samina Hernandezn : 1955 Age: 69 y.o. Sex: female  Length of Stay:  1    Interval History:      CC: F/U Chest Pain (Midsternal chest pain since 7pm. NV. Given 324 Aspirin and 4mg Zofran en route. )      Subjective:       Ms Sepulveda says she feels fine today except that she gets short of breath at times. When she ambulated she continued to need oxygen. Denies cough. Leg swelling much better.     Objective:     Vitals:    24 0814 24 0844 24 1132 24 1717   BP:  115/66 (!) 147/67 124/87   Pulse: 82 82 75 83   Resp: 18 18 18 18   Temp:  97.9 °F (36.6 °C) 97.5 °F (36.4 °C) 98.1 °F (36.7 °C)   TempSrc:  Oral Oral Oral   SpO2: 93% 96% 95% 96%   Weight:              Intake/Output Summary (Last 24 hours) at 3/7/2024 1836  Last data filed at 3/7/2024 0438  Gross per 24 hour   Intake 240 ml   Output 250 ml   Net -10 ml     Body mass index is 34.79 kg/m².    Physical Exam:  General: Cooperative, pleasant/Ill appearing, on  Nasal cannula  HEENT:  Head: normocephalic,atraumatic, anicteric sclera, clear conjunctiva  Neck: Normal size, Jugular venous pulsations: normal  Respiratory:unlabored breathing, clear to auscultation with no crackles, wheezes rhonchi  Heart: Regular rate and rhythm, S1, S2-normal, No murmurs  Abdomen: soft, nondistended, nontender, normoactive bowel sounds,  Neurological/Psych: Alert and oriented times three, no focal neurological deficits, Mood and affect appropriate.  Skin: No obvious rashes    Extremities:  no edema, Pedal pulses 2+ bilaterally    Scheduled Medications:  spironolactone, 25 mg, Daily  sodium chloride flush, 5-40 mL, 2 times per day  aspirin, 81 mg, Daily  atorvastatin, 40 mg, Nightly  enoxaparin, 30 mg, BID  insulin lispro, 0-4 Units, TID WC  insulin lispro, 0-4 Units, Nightly  buPROPion, 150 mg, Daily  citalopram, 40 mg, Nightly  furosemide, 40 mg,  VIEW)   Final Result   No acute findings         XR CHEST PORTABLE   Final Result   Cardiomegaly with pulmonary vascular congestion and mild pulmonary edema.             Microbiology: Cultures reviewed.   [unfilled]  [unfilled]    Lab Results   Component Value Date    LABA1C 6.5 11/08/2023      Body mass index is 34.79 kg/m².       Impression/Plan:        ASSESSMENT/PLAN:    Chest pressure - none currently  - Continue aspirin and Lipitor  - EKG without any ischemic changes   - Chest x-ray: cardiomegaly with pulmonary vascular congestion and mild pulmonary edema  - Trend troponins: 16->16->15->15  - PRN symptom control   - echo and stress test ordered  - cardiology consulted  - check TSH and d dimer - ok  - stress test is abnormal, will discuss with cardiology  - npo at midnight     Acute Respiratory Failure w/Hypoxia   - O2 sats 84% on RA, pt not on O2 at baseline   - tachypnea, dyspnea on admission  - supp O2, wean as tolerated, currently on 2L  - pulmonology consulted   - suspected cardiac, treated with lasix     Nausea/vomiting   Abdominal pain  - KUB ordered   - prn antiemetics and pain control   - consider GI consult if abd pain not resolving      DM type 2  - SSI  - carb control diet  - monitor BG   - cardiology recommends farxiga or jardiance      HTN  - on lasix  - monitor BP     HLD  - on statin     COPD no AE  - continue home inhalers     Hx of liver cirrhosis and fatty liver     Anxiety  Depression    - continue home regimen      Tobacco Dependence  - Recommended cessation  - nicotine replacement ordered       Morbid Obesity  - Body mass index is 35.6 kg/m².  - Complicating assessment and treatment. Placing patient at risk for multiple co-morbidities as well as early death and contributing to the patient's presentation.   - Counseled on weight loss.      Note chest pain makes the patient higher risk for morbidity and mortality requiring testing and treatment.     DVT Prophylaxis: Lovenox

## 2024-03-07 NOTE — FLOWSHEET NOTE
03/07/24 0844   Vital Signs   Temp 97.9 °F (36.6 °C)   Temp Source Oral   Pulse 82   Heart Rate Source Monitor   Respirations 18   /66   MAP (Calculated) 82   BP Location Left upper arm   BP Method Automatic   Patient Position Semi fowlers   Oxygen Therapy   SpO2 96 %   O2 Device Nasal cannula   O2 Flow Rate (L/min) 3 L/min     Shift assessment completed. See flow sheet. Medication given. Patient is A&O x4. Patient remains on 3L and denies further needs. Call light within reach.

## 2024-03-07 NOTE — FLOWSHEET NOTE
03/07/24 0038   Vital Signs   Temp 98 °F (36.7 °C)   Temp Source Oral   Pulse 76   Heart Rate Source Monitor   Respirations 18   /88   MAP (Calculated) 104   BP Location Left upper arm   BP Method Automatic   Patient Position Lying right side   Oxygen Therapy   SpO2 95 %   O2 Device Nasal cannula   O2 Flow Rate (L/min) 3 L/min     Pt resting with eyes closed chest rise and fall call light within reach.

## 2024-03-07 NOTE — PROGRESS NOTES
Pulmonary Progress Note  CC: sob, hypoxia     Subjective:  still on O2      EXAM: BP (!) 147/67   Pulse 75   Temp 97.5 °F (36.4 °C) (Oral)   Resp 18   Wt 97.7 kg (215 lb 6.4 oz)   SpO2 95%   BMI 34.79 kg/m²  on 2lpm  Constitutional:  No acute distress.   Eyes: PERRL. Conjunctivae anicteric.   ENT: Normal nose. Normal tongue.    Neck:  Trachea is midline.   Respiratory: No accessory muscle usage.  decreased breath sounds. No wheezes. No rales. No Rhonchi.  Cardiovascular: Normal S1S2. No digit clubbing. No digit cyanosis. No LE edema.   Psychiatric: No anxiety or Agitation. Alert and Oriented to person, place and time.    Scheduled Meds:   sodium chloride flush  5-40 mL IntraVENous 2 times per day    aspirin  81 mg Oral Daily    atorvastatin  40 mg Oral Nightly    enoxaparin  30 mg SubCUTAneous BID    insulin lispro  0-4 Units SubCUTAneous TID WC    insulin lispro  0-4 Units SubCUTAneous Nightly    buPROPion  150 mg Oral Daily    citalopram  40 mg Oral Nightly    furosemide  40 mg Oral Daily    gabapentin  100 mg Oral TID    mirtazapine  15 mg Oral Nightly    therapeutic multivitamin-minerals  1 tablet Oral Daily    budesonide-formoterol  2 puff Inhalation BID RT    And    tiotropium  2 puff Inhalation Daily RT    albuterol sulfate HFA  2 puff Inhalation BID RT     Continuous Infusions:   sodium chloride      dextrose       PRN Meds:  sodium chloride flush, sodium chloride, potassium chloride **OR** potassium alternative oral replacement **OR** potassium chloride, magnesium sulfate, ondansetron **OR** ondansetron, acetaminophen **OR** acetaminophen, polyethylene glycol, glucose, dextrose bolus **OR** dextrose bolus, glucagon (rDNA), dextrose, perflutren lipid microspheres, clonazePAM, oxyCODONE-acetaminophen, nicotine, regadenoson, albuterol sulfate HFA    Labs:  CBC:   Recent Labs     03/06/24  0424 03/07/24  0447   WBC 9.3 8.5   HGB 15.7 14.6   HCT 45.4 41.6   MCV 87.9 88.7    191     BMP:   Recent

## 2024-03-07 NOTE — PROGRESS NOTES
RT Inhaler-Nebulizer Bronchodilator Protocol Note    There is a bronchodilator order in the chart from a provider indicating to follow the RT Bronchodilator Protocol and there is an “Initiate RT Inhaler-Nebulizer Bronchodilator Protocol” order as well (see protocol at bottom of note).    CXR Findings:  XR CHEST PORTABLE    Result Date: 3/6/2024  Cardiomegaly with pulmonary vascular congestion and mild pulmonary edema.       The findings from the last RT Protocol Assessment were as follows:   History Pulmonary Disease: (P) Chronic pulmonary disease  Respiratory Pattern: (P) Regular pattern and RR 12-20 bpm  Breath Sounds: (P) Slightly diminished and/or crackles  Cough: (P) Strong, spontaneous, non-productive  Indication for Bronchodilator Therapy: (P) Decreased or absent breath sounds  Bronchodilator Assessment Score: (P) 4    Aerosolized bronchodilator medication orders have been revised according to the RT Inhaler-Nebulizer Bronchodilator Protocol below.    Respiratory Therapist to perform RT Therapy Protocol Assessment initially then follow the protocol.  Repeat RT Therapy Protocol Assessment PRN for score 0-3 or on second treatment, BID, and PRN for scores above 3.    No Indications - adjust the frequency to every 6 hours PRN wheezing or bronchospasm, if no treatments needed after 48 hours then discontinue using Per Protocol order mode.     If indication present, adjust the RT bronchodilator orders based on the Bronchodilator Assessment Score as indicated below.  Use Inhaler orders unless patient has one or more of the following: on home nebulizer, not able to hold breath for 10 seconds, is not alert and oriented, cannot activate and use MDI correctly, or respiratory rate 25 breaths per minute or more, then use the equivalent nebulizer order(s) with same Frequency and PRN reasons based on the score.  If a patient is on this medication at home then do not decrease Frequency below that used at home.    0-3 - enter  or revise RT bronchodilator order(s) to equivalent RT Bronchodilator order with Frequency of every 4 hours PRN for wheezing or increased work of breathing using Per Protocol order mode.        4-6 - enter or revise RT Bronchodilator order(s) to two equivalent RT bronchodilator orders with one order with BID Frequency and one order with Frequency of every 4 hours PRN wheezing or increased work of breathing using Per Protocol order mode.        7-10 - enter or revise RT Bronchodilator order(s) to two equivalent RT bronchodilator orders with one order with TID Frequency and one order with Frequency of every 4 hours PRN wheezing or increased work of breathing using Per Protocol order mode.       11-13 - enter or revise RT Bronchodilator order(s) to one equivalent RT bronchodilator order with QID Frequency and an Albuterol order with Frequency of every 4 hours PRN wheezing or increased work of breathing using Per Protocol order mode.      Greater than 13 - enter or revise RT Bronchodilator order(s) to one equivalent RT bronchodilator order with every 4 hours Frequency and an Albuterol order with Frequency of every 2 hours PRN wheezing or increased work of breathing using Per Protocol order mode.       Electronically signed by Roberto aPdilla RCP on 3/7/2024 at 8:18 AM

## 2024-03-07 NOTE — PROGRESS NOTES
Bedside report and transfer of care given to BRITTANIE Lan. Pt currently resting in bed with the call light within reach. Pt denies any other care needs at this time. Pt stable at this time.

## 2024-03-07 NOTE — PROGRESS NOTES
A Lexiscan Myoview stress test was completed on this patient as ordered. The patient tolerated the procedure well.  Awaiting stress imaging at this time.

## 2024-03-07 NOTE — PROGRESS NOTES
[facility] Daily Progress Note      Admit Date:  3/6/2024    Subjective:  Ms. Sepulveda is feeling better she is on O2 2 L/min she was hypoxic 86% on walking in room  No chest pressure reported today   Reason for Consultation/Chief Complaint: \"I have been having  shortness of breath and chest pressure in last 24 hrs.\"     History of Present Illness:  Samina Sepulveda is a 69 y.o. patient who presented to the hospital with complaints of shortness of breath and chest pressure.  She ate her dinner yesterday and then started vomiting. No blood in vomitus. No preceding abdominal pain or current abdominal pain.Says \" I vomited all night long\".  She feels chest pressure across anterior chest in center that radiated to rt side of neck started at the same time as above symptoms.Symptoms were worse with deep breath.  She had severe pain at the onset but is better now. No prior heart disease but has COPD 60pack yr smoker. Currently 2 ppd. No alcohol. No prior heart disease.  She has chronic mild swelling of both legs Lt >Rt.  .   Co morbidities  HBP DM2 HLD  Liver cirrhosis Emphysema 90 pack yr smoker I have been asked to provide consultation regarding further management and testing.     ROS:  12 point ROS negative in all areas as listed below except as in [place]  Constitutional, EENT,  GI, , Musculoskeletal, skin, neurological, hematological, endocrine, Psychiatric    Past Medical History:   Diagnosis Date    Anxiety     Arthritis     Depression     Disc degeneration 2014    Frequent UTI     Hyperlipidemia     Hypertension     Liver cirrhosis (HCC) 12/18/2019    Type 2 diabetes mellitus without complication, without long-term current use of insulin (HCC)      Past Surgical History:   Procedure Laterality Date    APPENDECTOMY      was 14 yrs old    CARPAL TUNNEL RELEASE      CHOLECYSTECTOMY      COLONOSCOPY      CYSTOSCOPY N/A 1/22/2020    CYSTOSCOPY, URETHRAL DILATION performed by Williams Dobson MD at [facility] OR    [facility]  7.3.23  EKG:  I have reviewed EKG with the following interpretation:  Normal sinus rhythmRight bundle branch blockLeft anterior fascicular block Bifascicular block Possible Lateral infarct , age undeterminedAbnormal ECGWhen compared with ECG of 08-NOV-2021 18:02,No significant change was found P   Normal sinus rhythmLeft axis deviationRight bundle branch blockAbnormal ECGNo significant change was foundWhen compared with ECG of12.14.17Confirmed by STEVENSON MILLAN MD ([id number]) on 11/8/2021 8:36:48 PM     XRAY chest 3.6.24  FINDINGS:  The mediastinum is unremarkable. The cardiac silhouette is moderately  enlarged.  There is pulmonary vascular congestion.  Increased perihilar  interstitial markings suggesting pulmonary edema.     IMPRESSION:  Cardiomegaly with pulmonary vascular congestion and mild pulmonary edema.                Specimen Collected: 03/06/24 05:13 EST Last Resulted: 03/06/24 05:14 EST                Impression:      IMPRESSION:  No acute cardiopulmonary findings                Specimen Collected: 03/05/24 10:16 EST Last Resulted: 03/05/24 10:17 EST                  IMPRESSION:  No acute cardiopulmonary findings                Specimen Collected: 03/05/24 10:16 EST Last Resulted: 03/05/24 10:17 EST                Assessment  Chest pressure in last 24 hrs radiated to right side neck improving now. She is ruled out for MI.  I will get an echo and nuclear stress test. Check D Dimer normal echo shows rt ventricle enlargement and reduced systolic function  stress test in progress   rule out sleep apnea discussed with Dr Jiménez he said he will follow up on that.  Acute pulmonary edema  Echo doppler of heart Corpulmonale on LASIX  she is improving  K is low I will add aldactone.  Multiple CAD risk factors HBP DM smoker high cholesterol   continue statin, BP control initiated currently not under control DM management per hospitalist team  recommend Farxiga or Jardiance  TSH normal       STEVENSON MILLAN MD, MD 3/7/2024

## 2024-03-07 NOTE — FLOWSHEET NOTE
03/06/24 1930   Vital Signs   Temp 98.2 °F (36.8 °C)   Temp Source Oral   Pulse 83   Heart Rate Source Monitor   Respirations 18   /87   MAP (Calculated) 102   BP Location Left upper arm   BP Method Automatic   Patient Position Sitting   Oxygen Therapy   SpO2 95 %   O2 Device Nasal cannula   O2 Flow Rate (L/min) 3 L/min     Pt in bed resting call light in reach. No distress or discomfort voiced. Medications adminstered.

## 2024-03-07 NOTE — CARE COORDINATION
Case Management Assessment  Initial Evaluation    Date/Time of Evaluation: 3/7/2024 10:34 AM  Assessment Completed by: SANGITA Brooks    If patient is discharged prior to next notation, then this note serves as note for discharge by case management.    Patient Name: Samina Sepulveda                   YOB: 1955  Diagnosis: Chest pain [R07.9]  Chest pain, unspecified type [R07.9]                   Date / Time: 3/6/2024  4:14 AM    Patient Admission Status: Inpatient   Readmission Risk (Low < 19, Mod (19-27), High > 27): Readmission Risk Score: 9    Current PCP: Buck Tavares MD  PCP verified by CM? (P) Yes    Chart Reviewed: Yes      History Provided by: (P) Patient  Patient Orientation: (P) Alert and Oriented, Person, Place, Situation, Self    Patient Cognition: (P) Alert    Hospitalization in the last 30 days (Readmission):  No    If yes, Readmission Assessment in  Navigator will be completed.    Advance Directives:      Code Status: Full Code   Patient's Primary Decision Maker is: (P) Legal Next of Kin    Primary Decision Maker: Aries Sepulveda - Spouse - [phone]    Discharge Planning:    Patient lives with: (P) Spouse/Significant Other, Family Members Type of Home: (P) House  Primary Care Giver: (P) Self  Patient Support Systems include: (P) Children, Spouse/Significant Other, Family Members   Current Financial resources: (P) Medicare  Current community resources: (P) None  Current services prior to admission: (P) None            Current DME:              Type of Home Care services:  (P) None    ADLS  Prior functional level: (P) Independent in ADLs/IADLs  Current functional level: (P) Independent in ADLs/IADLs    PT AM-PAC:   /24  OT AM-PAC:   /24    Family can provide assistance at DC: (P) Yes  Would you like Case Management to discuss the discharge plan with any other family members/significant others, and if so, who? (P) No  Plans to Return to Present Housing: (P) Yes  Other Identified  Issues/Barriers to RETURNING to current housing: none currently identified  Potential Assistance needed at discharge: (P) N/A            Potential DME:    Patient expects to discharge to: (P) House  Plan for transportation at discharge:      Financial    Payor: [employer] / Plan: [employer] ESSENTIAL/PLUS / Product Type: *No Product type* /     Does insurance require precert for SNF: Yes    Potential assistance Purchasing Medications: (P) No  Meds-to-Beds request:        [address] [phone] - F [phone]  [address] [place] [id number]  Phone: [phone] Fax: [phone]    [address] [phone] - F [phone]  [address] [place] [id number]  Phone: [phone] Fax: [phone]      Notes:    Factors facilitating achievement of predicted outcomes: Family support, Motivated, Cooperative, and Pleasant    Barriers to discharge: Pain    Additional Case Management Notes: met with patient at bedside and discussed role of CM. IPTA and expects to d/c home with family support. No CM needs identified    The Plan for Transition of Care is related to the following treatment goals of Chest pain [R07.9]  Chest pain, unspecified type [R07.9]    IF APPLICABLE: The Patient and/or patient representative Samina and her family were provided with a choice of provider and agrees with the discharge plan. Freedom of choice list with basic dialogue that supports the patient's individualized plan of care/goals and shares the quality data associated with the providers was provided to:     Patient Representative Name:       The Patient and/or Patient Representative Agree with the Discharge Plan?      SANGITA Broosk  Case Management Department  Ph: [phone] Fax:

## 2024-03-08 ENCOUNTER — HOSPITAL ENCOUNTER (INPATIENT)
Dept: CARDIAC CATH/INVASIVE PROCEDURES | Age: 69
LOS: 3 days | Discharge: HOME | DRG: 286 | End: 2024-03-11
Attending: INTERNAL MEDICINE | Admitting: HOSPITALIST
Payer: MEDICARE

## 2024-03-08 ENCOUNTER — APPOINTMENT (OUTPATIENT)
Dept: GENERAL RADIOLOGY | Age: 69
DRG: 286 | End: 2024-03-08
Attending: INTERNAL MEDICINE
Payer: MEDICARE

## 2024-03-08 VITALS
BODY MASS INDEX: 34.97 KG/M2 | OXYGEN SATURATION: 95 % | TEMPERATURE: 98 F | SYSTOLIC BLOOD PRESSURE: 120 MMHG | HEART RATE: 77 BPM | WEIGHT: 216.5 LBS | RESPIRATION RATE: 18 BRPM | DIASTOLIC BLOOD PRESSURE: 80 MMHG

## 2024-03-08 PROBLEM — J81.0 ACUTE PULMONARY EDEMA (HCC): Status: ACTIVE | Noted: 2024-03-08

## 2024-03-08 PROBLEM — Z98.890 STATUS POST CARDIAC CATHETERIZATION: Status: ACTIVE | Noted: 2024-03-08

## 2024-03-08 PROBLEM — J96.01 ACUTE HYPOXIC RESPIRATORY FAILURE (HCC): Status: ACTIVE | Noted: 2024-03-08

## 2024-03-08 PROBLEM — I20.0 UNSTABLE ANGINA (HCC): Status: ACTIVE | Noted: 2024-03-08

## 2024-03-08 PROBLEM — F17.200 SMOKER: Status: ACTIVE | Noted: 2017-03-27

## 2024-03-08 PROBLEM — R94.30 ABNORMAL CARDIAC FUNCTION TEST: Status: ACTIVE | Noted: 2024-03-08

## 2024-03-08 LAB
ANION GAP SERPL CALCULATED.3IONS-SCNC: 9 MMOL/L (ref 3–16)
BUN SERPL-MCNC: 9 MG/DL (ref 7–20)
CALCIUM SERPL-MCNC: 9 MG/DL (ref 8.3–10.6)
CHLORIDE SERPL-SCNC: 97 MMOL/L (ref 99–110)
CO2 SERPL-SCNC: 34 MMOL/L (ref 21–32)
CREAT SERPL-MCNC: <0.5 MG/DL (ref 0.6–1.2)
DEPRECATED RDW RBC AUTO: 14.6 % (ref 12.4–15.4)
GFR SERPLBLD CREATININE-BSD FMLA CKD-EPI: >60 ML/MIN/{1.73_M2}
GLUCOSE BLD-MCNC: 118 MG/DL (ref 70–99)
GLUCOSE BLD-MCNC: 130 MG/DL (ref 70–99)
GLUCOSE BLD-MCNC: 137 MG/DL (ref 70–99)
GLUCOSE BLD-MCNC: 216 MG/DL (ref 70–99)
GLUCOSE SERPL-MCNC: 97 MG/DL (ref 70–99)
HCT VFR BLD AUTO: 43.1 % (ref 36–48)
HGB BLD-MCNC: 14.3 G/DL (ref 12–16)
MAGNESIUM SERPL-MCNC: 2 MG/DL (ref 1.8–2.4)
MCH RBC QN AUTO: 30.3 PG (ref 26–34)
MCHC RBC AUTO-ENTMCNC: 33.2 G/DL (ref 31–36)
MCV RBC AUTO: 91.1 FL (ref 80–100)
PERFORMED ON: ABNORMAL
PLATELET # BLD AUTO: 184 K/UL (ref 135–450)
PMV BLD AUTO: 8 FL (ref 5–10.5)
POC ACT LR: 279 SEC
POTASSIUM SERPL-SCNC: 3.4 MMOL/L (ref 3.5–5.1)
RBC # BLD AUTO: 4.73 M/UL (ref 4–5.2)
SODIUM SERPL-SCNC: 140 MMOL/L (ref 136–145)
WBC # BLD AUTO: 7.1 K/UL (ref 4–11)

## 2024-03-08 PROCEDURE — 2580000003 HC RX 258: Performed by: HOSPITALIST

## 2024-03-08 PROCEDURE — 80048 BASIC METABOLIC PNL TOTAL CA: CPT

## 2024-03-08 PROCEDURE — 6370000000 HC RX 637 (ALT 250 FOR IP): Performed by: HOSPITALIST

## 2024-03-08 PROCEDURE — 71045 X-RAY EXAM CHEST 1 VIEW: CPT

## 2024-03-08 PROCEDURE — 2500000003 HC RX 250 WO HCPCS: Performed by: NURSE PRACTITIONER

## 2024-03-08 PROCEDURE — 2700000000 HC OXYGEN THERAPY PER DAY

## 2024-03-08 PROCEDURE — 93458 L HRT ARTERY/VENTRICLE ANGIO: CPT

## 2024-03-08 PROCEDURE — 2500000003 HC RX 250 WO HCPCS

## 2024-03-08 PROCEDURE — 2580000003 HC RX 258: Performed by: INTERNAL MEDICINE

## 2024-03-08 PROCEDURE — 6360000002 HC RX W HCPCS

## 2024-03-08 PROCEDURE — C1769 GUIDE WIRE: HCPCS | Performed by: INTERNAL MEDICINE

## 2024-03-08 PROCEDURE — 6370000000 HC RX 637 (ALT 250 FOR IP)

## 2024-03-08 PROCEDURE — 94640 AIRWAY INHALATION TREATMENT: CPT

## 2024-03-08 PROCEDURE — 85347 COAGULATION TIME ACTIVATED: CPT

## 2024-03-08 PROCEDURE — 94761 N-INVAS EAR/PLS OXIMETRY MLT: CPT

## 2024-03-08 PROCEDURE — B2111ZZ FLUOROSCOPY OF MULTIPLE CORONARY ARTERIES USING LOW OSMOLAR CONTRAST: ICD-10-PCS | Performed by: INTERNAL MEDICINE

## 2024-03-08 PROCEDURE — 2709999900 HC NON-CHARGEABLE SUPPLY: Performed by: INTERNAL MEDICINE

## 2024-03-08 PROCEDURE — 93458 L HRT ARTERY/VENTRICLE ANGIO: CPT | Performed by: INTERNAL MEDICINE

## 2024-03-08 PROCEDURE — C1894 INTRO/SHEATH, NON-LASER: HCPCS | Performed by: INTERNAL MEDICINE

## 2024-03-08 PROCEDURE — 99233 SBSQ HOSP IP/OBS HIGH 50: CPT | Performed by: INTERNAL MEDICINE

## 2024-03-08 PROCEDURE — B2151ZZ FLUOROSCOPY OF LEFT HEART USING LOW OSMOLAR CONTRAST: ICD-10-PCS | Performed by: INTERNAL MEDICINE

## 2024-03-08 PROCEDURE — 6370000000 HC RX 637 (ALT 250 FOR IP): Performed by: INTERNAL MEDICINE

## 2024-03-08 PROCEDURE — 99232 SBSQ HOSP IP/OBS MODERATE 35: CPT | Performed by: INTERNAL MEDICINE

## 2024-03-08 PROCEDURE — 4A023N7 MEASUREMENT OF CARDIAC SAMPLING AND PRESSURE, LEFT HEART, PERCUTANEOUS APPROACH: ICD-10-PCS | Performed by: INTERNAL MEDICINE

## 2024-03-08 PROCEDURE — C1887 CATHETER, GUIDING: HCPCS | Performed by: INTERNAL MEDICINE

## 2024-03-08 PROCEDURE — 85027 COMPLETE CBC AUTOMATED: CPT

## 2024-03-08 PROCEDURE — 36415 COLL VENOUS BLD VENIPUNCTURE: CPT

## 2024-03-08 PROCEDURE — 99152 MOD SED SAME PHYS/QHP 5/>YRS: CPT | Performed by: INTERNAL MEDICINE

## 2024-03-08 PROCEDURE — 1200000000 HC SEMI PRIVATE

## 2024-03-08 PROCEDURE — 83735 ASSAY OF MAGNESIUM: CPT

## 2024-03-08 RX ORDER — MIRTAZAPINE 15 MG/1
15 TABLET, FILM COATED ORAL NIGHTLY
Status: DISCONTINUED | OUTPATIENT
Start: 2024-03-08 | End: 2024-03-11 | Stop reason: HOSPADM

## 2024-03-08 RX ORDER — SODIUM CHLORIDE 9 MG/ML
INJECTION, SOLUTION INTRAVENOUS PRN
Status: DISCONTINUED | OUTPATIENT
Start: 2024-03-08 | End: 2024-03-11 | Stop reason: HOSPADM

## 2024-03-08 RX ORDER — ASPIRIN 81 MG/1
81 TABLET ORAL DAILY
COMMUNITY

## 2024-03-08 RX ORDER — SODIUM CHLORIDE 0.9 % (FLUSH) 0.9 %
5-40 SYRINGE (ML) INJECTION PRN
Status: DISCONTINUED | OUTPATIENT
Start: 2024-03-08 | End: 2024-03-11 | Stop reason: HOSPADM

## 2024-03-08 RX ORDER — ASPIRIN 325 MG
325 TABLET ORAL DAILY
Status: DISCONTINUED | OUTPATIENT
Start: 2024-03-09 | End: 2024-03-11 | Stop reason: HOSPADM

## 2024-03-08 RX ORDER — FUROSEMIDE 10 MG/ML
40 INJECTION INTRAMUSCULAR; INTRAVENOUS ONCE
Status: DISCONTINUED | OUTPATIENT
Start: 2024-03-08 | End: 2024-03-11 | Stop reason: HOSPADM

## 2024-03-08 RX ORDER — ENOXAPARIN SODIUM 100 MG/ML
40 INJECTION SUBCUTANEOUS DAILY
Status: DISCONTINUED | OUTPATIENT
Start: 2024-03-09 | End: 2024-03-11 | Stop reason: HOSPADM

## 2024-03-08 RX ORDER — CITALOPRAM 20 MG/1
40 TABLET ORAL DAILY
Status: DISCONTINUED | OUTPATIENT
Start: 2024-03-08 | End: 2024-03-11 | Stop reason: HOSPADM

## 2024-03-08 RX ORDER — GABAPENTIN 100 MG/1
100 CAPSULE ORAL 3 TIMES DAILY
Status: DISCONTINUED | OUTPATIENT
Start: 2024-03-08 | End: 2024-03-11 | Stop reason: HOSPADM

## 2024-03-08 RX ORDER — IPRATROPIUM BROMIDE AND ALBUTEROL SULFATE 2.5; .5 MG/3ML; MG/3ML
1 SOLUTION RESPIRATORY (INHALATION) EVERY 4 HOURS PRN
Status: DISCONTINUED | OUTPATIENT
Start: 2024-03-08 | End: 2024-03-11 | Stop reason: HOSPADM

## 2024-03-08 RX ORDER — ONDANSETRON 4 MG/1
4 TABLET, ORALLY DISINTEGRATING ORAL EVERY 8 HOURS PRN
Status: DISCONTINUED | OUTPATIENT
Start: 2024-03-08 | End: 2024-03-11 | Stop reason: HOSPADM

## 2024-03-08 RX ORDER — ACETAMINOPHEN 325 MG/1
650 TABLET ORAL EVERY 6 HOURS PRN
Status: DISCONTINUED | OUTPATIENT
Start: 2024-03-08 | End: 2024-03-11 | Stop reason: HOSPADM

## 2024-03-08 RX ORDER — BUPROPION HYDROCHLORIDE 150 MG/1
150 TABLET ORAL DAILY
Status: DISCONTINUED | OUTPATIENT
Start: 2024-03-09 | End: 2024-03-11 | Stop reason: HOSPADM

## 2024-03-08 RX ORDER — POLYETHYLENE GLYCOL 3350 17 G/17G
17 POWDER, FOR SOLUTION ORAL DAILY PRN
Status: DISCONTINUED | OUTPATIENT
Start: 2024-03-08 | End: 2024-03-11 | Stop reason: HOSPADM

## 2024-03-08 RX ORDER — ENOXAPARIN SODIUM 100 MG/ML
40 INJECTION SUBCUTANEOUS DAILY
Status: DISCONTINUED | OUTPATIENT
Start: 2024-03-08 | End: 2024-03-08 | Stop reason: HOSPADM

## 2024-03-08 RX ORDER — ACETAMINOPHEN 650 MG/1
650 SUPPOSITORY RECTAL EVERY 6 HOURS PRN
Status: DISCONTINUED | OUTPATIENT
Start: 2024-03-08 | End: 2024-03-11 | Stop reason: HOSPADM

## 2024-03-08 RX ORDER — FUROSEMIDE 40 MG/1
40 TABLET ORAL DAILY
Status: DISCONTINUED | OUTPATIENT
Start: 2024-03-09 | End: 2024-03-11 | Stop reason: HOSPADM

## 2024-03-08 RX ORDER — ATORVASTATIN CALCIUM 10 MG/1
20 TABLET, FILM COATED ORAL NIGHTLY
Status: DISCONTINUED | OUTPATIENT
Start: 2024-03-08 | End: 2024-03-11 | Stop reason: HOSPADM

## 2024-03-08 RX ORDER — FENTANYL CITRATE 50 UG/ML
INJECTION, SOLUTION INTRAMUSCULAR; INTRAVENOUS
Status: COMPLETED | OUTPATIENT
Start: 2024-03-08 | End: 2024-03-08

## 2024-03-08 RX ORDER — POTASSIUM CHLORIDE 20 MEQ/1
40 TABLET, EXTENDED RELEASE ORAL ONCE
Status: COMPLETED | OUTPATIENT
Start: 2024-03-08 | End: 2024-03-08

## 2024-03-08 RX ORDER — INSULIN LISPRO 100 [IU]/ML
0-4 INJECTION, SOLUTION INTRAVENOUS; SUBCUTANEOUS NIGHTLY
Status: DISCONTINUED | OUTPATIENT
Start: 2024-03-08 | End: 2024-03-11 | Stop reason: HOSPADM

## 2024-03-08 RX ORDER — SODIUM CHLORIDE 0.9 % (FLUSH) 0.9 %
5-40 SYRINGE (ML) INJECTION EVERY 12 HOURS SCHEDULED
Status: DISCONTINUED | OUTPATIENT
Start: 2024-03-08 | End: 2024-03-11 | Stop reason: HOSPADM

## 2024-03-08 RX ORDER — POTASSIUM CHLORIDE 7.45 MG/ML
10 INJECTION INTRAVENOUS PRN
Status: DISCONTINUED | OUTPATIENT
Start: 2024-03-08 | End: 2024-03-11 | Stop reason: HOSPADM

## 2024-03-08 RX ORDER — INSULIN LISPRO 100 [IU]/ML
0-8 INJECTION, SOLUTION INTRAVENOUS; SUBCUTANEOUS
Status: DISCONTINUED | OUTPATIENT
Start: 2024-03-08 | End: 2024-03-11 | Stop reason: HOSPADM

## 2024-03-08 RX ORDER — ONDANSETRON 2 MG/ML
4 INJECTION INTRAMUSCULAR; INTRAVENOUS EVERY 6 HOURS PRN
Status: DISCONTINUED | OUTPATIENT
Start: 2024-03-08 | End: 2024-03-11 | Stop reason: HOSPADM

## 2024-03-08 RX ORDER — MIDAZOLAM HYDROCHLORIDE 1 MG/ML
INJECTION INTRAMUSCULAR; INTRAVENOUS
Status: COMPLETED | OUTPATIENT
Start: 2024-03-08 | End: 2024-03-08

## 2024-03-08 RX ORDER — ACETAMINOPHEN 325 MG/1
650 TABLET ORAL EVERY 4 HOURS PRN
Status: DISCONTINUED | OUTPATIENT
Start: 2024-03-08 | End: 2024-03-08 | Stop reason: SDUPTHER

## 2024-03-08 RX ORDER — MAGNESIUM SULFATE IN WATER 40 MG/ML
2000 INJECTION, SOLUTION INTRAVENOUS PRN
Status: DISCONTINUED | OUTPATIENT
Start: 2024-03-08 | End: 2024-03-11 | Stop reason: HOSPADM

## 2024-03-08 RX ORDER — DEXTROSE MONOHYDRATE 100 MG/ML
INJECTION, SOLUTION INTRAVENOUS CONTINUOUS PRN
Status: DISCONTINUED | OUTPATIENT
Start: 2024-03-08 | End: 2024-03-11 | Stop reason: HOSPADM

## 2024-03-08 RX ORDER — HEPARIN SODIUM 1000 [USP'U]/ML
INJECTION, SOLUTION INTRAVENOUS; SUBCUTANEOUS
Status: COMPLETED | OUTPATIENT
Start: 2024-03-08 | End: 2024-03-08

## 2024-03-08 RX ORDER — GLUCAGON 1 MG/ML
1 KIT INJECTION PRN
Status: DISCONTINUED | OUTPATIENT
Start: 2024-03-08 | End: 2024-03-11 | Stop reason: HOSPADM

## 2024-03-08 RX ORDER — POTASSIUM CHLORIDE 20 MEQ/1
40 TABLET, EXTENDED RELEASE ORAL PRN
Status: DISCONTINUED | OUTPATIENT
Start: 2024-03-08 | End: 2024-03-11 | Stop reason: HOSPADM

## 2024-03-08 RX ADMIN — Medication 10 ML: at 20:33

## 2024-03-08 RX ADMIN — MIRTAZAPINE 15 MG: 15 TABLET, FILM COATED ORAL at 20:32

## 2024-03-08 RX ADMIN — MICONAZOLE NITRATE: 2 POWDER TOPICAL at 20:32

## 2024-03-08 RX ADMIN — FENTANYL CITRATE 25 MCG: 50 INJECTION, SOLUTION INTRAMUSCULAR; INTRAVENOUS at 13:23

## 2024-03-08 RX ADMIN — GABAPENTIN 100 MG: 100 CAPSULE ORAL at 18:54

## 2024-03-08 RX ADMIN — GABAPENTIN 100 MG: 100 CAPSULE ORAL at 09:40

## 2024-03-08 RX ADMIN — Medication 2 PUFF: at 19:30

## 2024-03-08 RX ADMIN — Medication 10 ML: at 09:41

## 2024-03-08 RX ADMIN — Medication 2 PUFF: at 07:47

## 2024-03-08 RX ADMIN — HEPARIN SODIUM 5000 UNITS: 1000 INJECTION, SOLUTION INTRAVENOUS; SUBCUTANEOUS at 13:26

## 2024-03-08 RX ADMIN — MIDAZOLAM HYDROCHLORIDE 1 MG: 1 INJECTION INTRAMUSCULAR; INTRAVENOUS at 13:23

## 2024-03-08 RX ADMIN — ATORVASTATIN CALCIUM 20 MG: 10 TABLET, FILM COATED ORAL at 20:31

## 2024-03-08 RX ADMIN — CITALOPRAM 40 MG: 20 TABLET, FILM COATED ORAL at 18:54

## 2024-03-08 RX ADMIN — Medication 10 ML: at 20:34

## 2024-03-08 RX ADMIN — ASPIRIN 81 MG: 81 TABLET, CHEWABLE ORAL at 09:40

## 2024-03-08 RX ADMIN — POTASSIUM CHLORIDE 40 MEQ: 1500 TABLET, EXTENDED RELEASE ORAL at 07:35

## 2024-03-08 RX ADMIN — POTASSIUM CHLORIDE 40 MEQ: 1500 TABLET, EXTENDED RELEASE ORAL at 18:54

## 2024-03-08 RX ADMIN — TIOTROPIUM BROMIDE INHALATION SPRAY 2 PUFF: 3.12 SPRAY, METERED RESPIRATORY (INHALATION) at 07:47

## 2024-03-08 RX ADMIN — ACETAMINOPHEN 650 MG: 325 TABLET ORAL at 05:16

## 2024-03-08 RX ADMIN — BUPROPION HYDROCHLORIDE 150 MG: 150 TABLET, EXTENDED RELEASE ORAL at 09:40

## 2024-03-08 RX ADMIN — Medication 2 PUFF: at 07:46

## 2024-03-08 RX ADMIN — I-VITE, TAB 1000-60-2MG (60/BT) 1 TABLET: TAB at 09:41

## 2024-03-08 ASSESSMENT — PAIN SCALES - GENERAL
PAINLEVEL_OUTOF10: 0
PAINLEVEL_OUTOF10: 4

## 2024-03-08 ASSESSMENT — PAIN - FUNCTIONAL ASSESSMENT: PAIN_FUNCTIONAL_ASSESSMENT: ACTIVITIES ARE NOT PREVENTED

## 2024-03-08 ASSESSMENT — PAIN DESCRIPTION - DESCRIPTORS: DESCRIPTORS: ACHING

## 2024-03-08 ASSESSMENT — PAIN DESCRIPTION - LOCATION: LOCATION: HEAD

## 2024-03-08 ASSESSMENT — PAIN DESCRIPTION - ORIENTATION: ORIENTATION: ANTERIOR

## 2024-03-08 NOTE — PROGRESS NOTES
41.6 43.1   MCV 87.9 88.7 91.1    191 184       BMP:   Recent Labs     03/06/24  0424 03/07/24  0447 03/08/24  0438    144 140   K 3.8 3.3* 3.4*   CL 94* 99 97*   CO2 30 37* 34*   BUN 7 8 9   CREATININE <0.5* <0.5* <0.5*         Ddimer negative     Chest imaging was reviewed by me and showed 3/6/24 CXR: IMPRESSION:  Cardiomegaly with pulmonary vascular congestion and mild pulmonary edema.     ASSESSMENT:  Acute hypoxic respiratory failure: Patient has a low resting SpO2 at baseline of 90 to 93% on room air as an outpatient. She probably needs exertional supplemental O2.  COPD  Abd pain with N/V - viral?  Tobacco abuse     PLAN:  Supplemental oxygen to maintain SaO2 >92%; wean as tolerated    Bronchodilators  No steroids for now  Tobacco cessation counseled  Pt qualifies for home O2, 2lpm on exertion  Needs outpt [facility]  Mild RV dysfunction on TTE  Pt going to [facility] for [facility] today

## 2024-03-08 NOTE — PROCEDURES
CARDIAC CATHETERIZATION REPORT     Procedure Date:  3/8/2024  Patient Name: Samina Hernandezn  MRN: [id number] : 1955      INDICATION     Abnormal stress test    PROCEDURES PERFORMED     Left heart catheterization  LVgram  Coronary angiogam  Coronary cath  Monitoring of moderate conscious sedation        PROCEDURE DESCRIPTION   Risks/benefits/alternatives/outcomes were discussed with patient and/or family and informed consent was obtained.  Using the Barbeau scale, the patient's right radial artery was found to be a level B.  Patient was prepped draped in the usual sterile fashion.  Local anaesthetic was applied over puncture site.  Using a back wall technique, a 6 Kiswahili Terumo sheath was inserted into right radial artery.  Verapamil, nitroglycerin, Cardene were administered through the sheath.  Heparin was administered.  Diagnostic 5 Thai pigtail, Tygh catheters were used for diagnostic angiograms.  At the conclusion of the procedure, a TR band was placed over the puncture site and hemostasis was obtained.  There were no immediate complications. I supervised sedation from 1:23 PM to 1:34 PM with versed 1 mg/fentanyl 25 mcg during the procedure. An independent trained observer pushed meds at my direction.  We monitored the patient's level of consciousness and vital signs/physiologic status throughout the procedure duration (see times listed previously).  60 cc contrast was utilized. <20cc EBL.      FINDINGS       LVGRAM    LVEDP 12   GRADIENT ACROSS AORTIC VALVE None   LV FUNCTION EF 60%   WALL MOTION Normal   MITRAL REGURGITATION Mild     Brachiocephalic tortuosity is noted    CORONARY ARTERIES    LM Less than 10% proximal-mid stenosis, distal 10 to 20% stenosis       LAD 20 to 30% proximal-manage-distal stenosis, the distal vessel at the apex tapers abruptly with 50% stenosis       LCX Somewhat retroflexed, there is less than 10% [id number] stenosis       RCA Anterior and vertical takeoff,

## 2024-03-08 NOTE — FLOWSHEET NOTE
03/07/24 1717   Vital Signs   Temp 98.1 °F (36.7 °C)   Temp Source Oral   Pulse 83   Heart Rate Source Monitor   Respirations 18   /87   MAP (Calculated) 99   BP Location Left upper arm   BP Method Automatic   Patient Position Semi fowlers   Oxygen Therapy   SpO2 96 %     Patient returned from Stress. Patient given meds and food. Vitals are stable. Patient denies further needs. Call light within reach.

## 2024-03-08 NOTE — FLOWSHEET NOTE
03/08/24 0742   Vital Signs   Temp 98 °F (36.7 °C)   Temp Source Oral   Pulse 77   Heart Rate Source Monitor   Respirations 18   /61   MAP (Calculated) 78   BP Location Left upper arm   BP Method Automatic   Patient Position Semi fowlers   Oxygen Therapy   SpO2 98 %   O2 Device Nasal cannula   O2 Flow Rate (L/min) 3 L/min     Shift assessment completed. See flow sheet. Medications given. Patient is A&O x4. Vitals are stable. Patient is resting comfortably on 2L of O2 and denies further needs. Call light within reach.

## 2024-03-08 NOTE — FLOWSHEET NOTE
03/07/24 1930   Vital Signs   Temp 98.3 °F (36.8 °C)   Temp Source Oral   Pulse 84   Heart Rate Source Monitor   Respirations 20   BP (!) 215/98   MAP (Calculated) 137   BP Location Left upper arm   BP Method Automatic   Patient Position Sitting   Oxygen Therapy   SpO2 98 %   O2 Device Nasal cannula   O2 Flow Rate (L/min) 3 L/min     Pt awake c/o of HA requested PRN Tylenol . O2 in place SpO2 92. HR running SR.Call light in place.

## 2024-03-08 NOTE — CONSULTS
Consult Placed     Who: *JUAN, HOSPITALISTS   Date:03/08/24  Time:0632     Electronically signed by Shaheed Parish on 3/8/2024 at 6:32 PM

## 2024-03-08 NOTE — CARE COORDINATION
Reviewed chart, met with pt bedside. Pt to be transferred to [facility] today for LHC.

## 2024-03-08 NOTE — PROGRESS NOTES
RT Inhaler-Nebulizer Bronchodilator Protocol Note    There is a bronchodilator order in the chart from a provider indicating to follow the RT Bronchodilator Protocol and there is an “Initiate RT Inhaler-Nebulizer Bronchodilator Protocol” order as well (see protocol at bottom of note).    CXR Findings:  XR CHEST PORTABLE    Result Date: 3/6/2024  Cardiomegaly with pulmonary vascular congestion and mild pulmonary edema.       The findings from the last RT Protocol Assessment were as follows:   History Pulmonary Disease: Chronic pulmonary disease  Respiratory Pattern: Regular pattern and RR 12-20 bpm  Breath Sounds: Slightly diminished and/or crackles  Cough: Strong, spontaneous, non-productive  Indication for Bronchodilator Therapy: Decreased or absent breath sounds  Bronchodilator Assessment Score: 4    Aerosolized bronchodilator medication orders have been revised according to the RT Inhaler-Nebulizer Bronchodilator Protocol below.    Respiratory Therapist to perform RT Therapy Protocol Assessment initially then follow the protocol.  Repeat RT Therapy Protocol Assessment PRN for score 0-3 or on second treatment, BID, and PRN for scores above 3.    No Indications - adjust the frequency to every 6 hours PRN wheezing or bronchospasm, if no treatments needed after 48 hours then discontinue using Per Protocol order mode.     If indication present, adjust the RT bronchodilator orders based on the Bronchodilator Assessment Score as indicated below.  Use Inhaler orders unless patient has one or more of the following: on home nebulizer, not able to hold breath for 10 seconds, is not alert and oriented, cannot activate and use MDI correctly, or respiratory rate 25 breaths per minute or more, then use the equivalent nebulizer order(s) with same Frequency and PRN reasons based on the score.  If a patient is on this medication at home then do not decrease Frequency below that used at home.    0-3 - enter or revise RT  bronchodilator order(s) to equivalent RT Bronchodilator order with Frequency of every 4 hours PRN for wheezing or increased work of breathing using Per Protocol order mode.        4-6 - enter or revise RT Bronchodilator order(s) to two equivalent RT bronchodilator orders with one order with BID Frequency and one order with Frequency of every 4 hours PRN wheezing or increased work of breathing using Per Protocol order mode.        7-10 - enter or revise RT Bronchodilator order(s) to two equivalent RT bronchodilator orders with one order with TID Frequency and one order with Frequency of every 4 hours PRN wheezing or increased work of breathing using Per Protocol order mode.       11-13 - enter or revise RT Bronchodilator order(s) to one equivalent RT bronchodilator order with QID Frequency and an Albuterol order with Frequency of every 4 hours PRN wheezing or increased work of breathing using Per Protocol order mode.      Greater than 13 - enter or revise RT Bronchodilator order(s) to one equivalent RT bronchodilator order with every 4 hours Frequency and an Albuterol order with Frequency of every 2 hours PRN wheezing or increased work of breathing using Per Protocol order mode.         Electronically signed by Brenna Rivas RCP on 3/7/2024 at 8:46 PM

## 2024-03-08 NOTE — H&P
V2.0  History and Physical      Name:  Samina Sepulveda /Age/Sex: 1955  (69 y.o. female)   MRN & CSN:  [id number] & [id number] Encounter Date/Time: 3/8/2024 4:42 PM EST   Location:  [address] [place] [address] PCP: Buck Tavares MD       Hospital Day: 1    Assessment and Plan:       Hospital Problems             Last Modified POA      Yes    Acute hypoxic respiratory failure (HCC) (principal) 3/8/2024 Yes      Status post cardiac catheterization   Hypokalemia  Possible acute on chronic diastolic CHF, EF 55 to 60% with grade 1 diastolic dysfunction as per echocardiogram done on 3/6/2024  COPD, not in acute exacerbation  Essential hypertension  Diabetes mellitus type 2 with hyperglycemia on long-term insulin  Anxiety/depression  GERD      Admit to inpatient status for further management  Telemetry  Oxygen via nasal cannula to keep O2 sat more than 90%  Abiodun  He had chest x-ray on 3/6 which showed cardiomegaly with pulmonary edema  Ordered Lasix 40 mg IV now, continue home p.o. Lasix from tomorrow, will get repeat chest x-ray today  Monitor lab  Will continue most of the home meds as ordered  Accu-Cheks/sliding scale insulin ordered  Supportive care  Full code  Further management based hospital course      Disposition:   Current Living situation: Home  Expected Disposition: Home  Estimated D/C: 2 to 3 days    Diet ADULT DIET; Regular   DVT Prophylaxis [x] Lovenox, []  Heparin, [] SCDs, [] Ambulation,  [] Eliquis, [] Xarelto, [] Coumadin   Code Status Full Code   Surrogate Decision Maker/ POA yes     Personally reviewed Lab Studies and Imaging     Discussed management of the case with patient who verbalized understanding        History from:     patient    History of Present Illness:     Chief Complaint: Shortness of breath  Samina HECK Derick is a 69 y.o. female with pmh of essential hypertension, hyperlipidemia, COPD, diastolic CHF, GERD who presents with above complaint.  Patient had abnormal stress test and underwent

## 2024-03-08 NOTE — FLOWSHEET NOTE
03/08/24 1058   Vital Signs   Temp 98 °F (36.7 °C)   Temp Source Oral   Pulse 77   Heart Rate Source Monitor   Respirations 18   /80   MAP (Calculated) 93   BP Location Left upper arm   BP Method Automatic   Patient Position Sitting   Oxygen Therapy   SpO2 95 %   O2 Device Nasal cannula   O2 Flow Rate (L/min) 2 L/min     Vitals remain stable. Patient is A&O x4. Call light within reach.

## 2024-03-08 NOTE — DISCHARGE SUMMARY
Hospital Medicine Discharge Summary    Patient: Samina HECK Derick     Gender: female  : 1955   Age: 69 y.o.  MRN: [id number]    Admitting Physician: Carolin Khan DO  Discharge Physician: Carolin Khan DO    Code Status: Full Code     Admit Date: 3/6/2024   Discharge Date:  3/8/2024     Discharge Diagnoses:    Active Hospital Problems    Diagnosis Date Noted    Chronic obstructive pulmonary disease (HCC) [J44.9] 2022     Priority: Medium    Acute respiratory failure with hypoxia (HCC) [J96.01] 2024    Type 2 diabetes mellitus without complication, without long-term current use of insulin (HCC) [E11.9]     Cirrhosis of liver without ascites (HCC) [K74.60]     Chest pain [R07.9] 2017    Tobacco abuse [Z72.0] 2017    Chronic back pain [M54.9, G89.29] 2014    Depression [F32.A] 2014    GERD (gastroesophageal reflux disease) [K21.9] 2013    Anxiety [F41.9] 2010    Hypertension [I10] 2010         Condition at Discharge: Stable    Hospital Course:           Additional findings or notes to primary provider:  None at this time    Discharge Medications:   Current Discharge Medication List        Current Discharge Medication List        Current Discharge Medication List        CONTINUE these medications which have NOT CHANGED    Details   clonazePAM (KLONOPIN) 0.5 MG tablet TAKE ONE (1) TABLET TWICE DAILY AS NEEDED FOR ANXIETY FOR UP TO 30 DAYS  Qty: 60 tablet, Refills: 0    Associated Diagnoses: Panic disorder      buPROPion (WELLBUTRIN XL) 150 MG extended release tablet TAKE ONE (1) TABLET IN THE MORNING  Qty: 30 tablet, Refills: 3    Associated Diagnoses: Other depression      gabapentin (NEURONTIN) 100 MG capsule TAKE (1) CAPSULE THREE TIMES DAILY.  Qty: 90 capsule, Refills: 2    Associated Diagnoses: Type 2 diabetes mellitus without complication, without long-term current use of insulin (HCC); Burning sensation of feet; DDD (degenerative disc disease), lumbar       Continuous Blood Gluc Sensor (FREESTYLE KJ 2 SENSOR) MISC USE TO CHECK BLOOD SUGAR AS DIRECTED. CHANGE EVERY 14 DAYS  Qty: 2 each, Refills: 2      furosemide (LASIX) 40 MG tablet TAKE ONE (1) TABLET BY MOUTH DAILY  Qty: 90 tablet, Refills: 1    Associated Diagnoses: Dependent edema      atorvastatin (LIPITOR) 20 MG tablet TAKE ONE (1) TABLET NIGHTLY  Qty: 90 tablet, Refills: 3    Associated Diagnoses: Hyperlipidemia with target LDL less than 130; Essential hypertension      citalopram (CELEXA) 40 MG tablet TAKE ONE (1) TABLET BY MOUTH NIGHTLY  Qty: 90 tablet, Refills: 3    Associated Diagnoses: Depression, unspecified depression type      Semaglutide,0.25 or 0.5MG/DOS, (OZEMPIC, 0.25 OR 0.5 MG/DOSE,) 2 MG/3ML SOPN Inject 0.25 mg into the skin once a week  Qty: 3 mL, Refills: 0    Associated Diagnoses: Type 2 diabetes mellitus without complication, without long-term current use of insulin ([facility])      fluticasone-umeclidin-vilant (TRELEGY ELLIPTA) 200-62.5-25 MCG/ACT AEPB inhaler Inhale 1 puff into the lungs daily  Qty: 1 each, Refills: 5    Associated Diagnoses: Chronic obstructive pulmonary disease, unspecified COPD type ([facility])      !! Continuous Blood Gluc  (FREESTYLE KJ READER) JEN 1 each by Does not apply route daily  Qty: 1 each, Refills: 0      !! Continuous Blood Gluc  (FREESTYLE KJ 14 DAY READER) JEN 1 each by Does not apply route every 14 days  Qty: 1 each, Refills: 0      albuterol sulfate HFA (PROVENTIL;VENTOLIN;PROAIR) 108 (90 Base) MCG/ACT inhaler Inhale 2 puffs into the lungs every 6 hours as needed for Wheezing  Qty: 18 g, Refills: 3    Associated Diagnoses: Shortness of breath; Wheezing      oxyCODONE-acetaminophen (PERCOCET) 5-325 MG per tablet       mirtazapine (REMERON) 15 MG tablet TAKE ONE (1) TABLET BY MOUTH NIGHTLY  Qty: 90 tablet, Refills: 3    Associated Diagnoses: Sleep disturbance      metFORMIN (GLUCOPHAGE-XR) 500 MG extended release tablet TAKE ONE (1) TABLET BY

## 2024-03-08 NOTE — PROGRESS NOTES
[facility] Daily Progress Note      Admit Date:  3/6/2024    Subjective:  Ms. Sepulveda is feeling better she is on O2 2 L/min she was hypoxic 86% on walking in room  No chest pressure reported today  stress test 3.7.24 abnormal  needs further work up.  Reason for Consultation/Chief Complaint: \"I have been having  shortness of breath and chest pressure in last 24 hrs.\"     History of Present Illness:  Samina Sepulveda is a 69 y.o. patient who presented to the hospital with complaints of shortness of breath and chest pressure.  She ate her dinner yesterday and then started vomiting. No blood in vomitus. No preceding abdominal pain or current abdominal pain.Says \" I vomited all night long\".  She feels chest pressure across anterior chest in center that radiated to rt side of neck started at the same time as above symptoms.Symptoms were worse with deep breath.  She had severe pain at the onset but is better now. No prior heart disease but has COPD 60pack yr smoker. Currently 2 ppd. No alcohol. No prior heart disease.  She has chronic mild swelling of both legs Lt >Rt.  .   Co morbidities  HBP DM2 HLD  Liver cirrhosis Emphysema 90 pack yr smoker I have been asked to provide consultation regarding further management and testing.     ROS:  12 point ROS negative in all areas as listed below except as in [place]  Constitutional, EENT,  GI, , Musculoskeletal, skin, neurological, hematological, endocrine, Psychiatric    Past Medical History:   Diagnosis Date    Anxiety     Arthritis     Depression     Disc degeneration 2014    Frequent UTI     Hyperlipidemia     Hypertension     Liver cirrhosis (HCC) 12/18/2019    Type 2 diabetes mellitus without complication, without long-term current use of insulin (HCC)      Past Surgical History:   Procedure Laterality Date    APPENDECTOMY      was 14 yrs old    CARPAL TUNNEL RELEASE      CHOLECYSTECTOMY      COLONOSCOPY      CYSTOSCOPY N/A 1/22/2020    CYSTOSCOPY, URETHRAL DILATION  performed by Williams Dobson MD at [facility] OR    FOOT SURGERY Right 4/4/12    FOOT SURGERY Right 06/12/13    FIRST METATARSAL PHALANGEAL JOINT IMPLANTATION RIGHT FOOT    HYSTERECTOMY (CERVIX STATUS UNKNOWN)  1980    complete and 1982;endomwtriosis;    ROTATOR CUFF REPAIR      1988    TUBAL LIGATION  1984    ULNAR TUNNEL RELEASE      UPPER GASTROINTESTINAL ENDOSCOPY N/A 12/5/2019    EGD (10:00) **FIBROSCAN TOO** performed by Roberto Whiting MD at [facility]       Objective:   /61   Pulse 77   Temp 98 °F (36.7 °C) (Oral)   Resp 18   Wt 98.2 kg (216 lb 8 oz)   SpO2 99%   BMI 34.97 kg/m²     Intake/Output Summary (Last 24 hours) at 3/8/2024 1001  Last data filed at 3/8/2024 0815  Gross per 24 hour   Intake 380 ml   Output --   Net 380 ml         TELEMETRY: Sinus     Physical Exam:  General: No Respiratory distress, appears well developed and well nourished.   Eyes:  Sclera nonicteric  Nose/Sinuses:  negative findings: nose shows no deformity, asymmetry, or inflammation, nasal mucosa normal, septum midline with no perforation or bleeding  Back:  no pain to palpation  Joint:  no active joint inflammation  Musculoskeletal:  negative  Skin:  Warm and dry  Neck:  Negative for JVD and Carotid Bruits.   Chest:  Clear to auscultation, respiration easy  Cardiovascular:  RRR, S1S2 normal, no murmur, no rub or thrill.  Abdomen:  Soft normal liver and spleen  Extremities:   No edema, clubbing, cyanosis,  Pulses:  pedal pulses are normal.  Neuro: intact    Medications:    spironolactone  25 mg Oral Daily    sodium chloride flush  5-40 mL IntraVENous 2 times per day    aspirin  81 mg Oral Daily    atorvastatin  40 mg Oral Nightly    enoxaparin  30 mg SubCUTAneous BID    insulin lispro  0-4 Units SubCUTAneous TID     insulin lispro  0-4 Units SubCUTAneous Nightly    buPROPion  150 mg Oral Daily    citalopram  40 mg Oral Nightly    furosemide  40 mg Oral Daily    gabapentin  100 mg Oral TID    mirtazapine  15  ventricular hypertrophy.   Grade I diastolic dysfunction with normal filling pressure.   Right ventricular systolic function is mildly reduced .   The right ventricle is mildly enlarged.   The right atrium is mildly dilated.   Mild posterior mitral annular calcification is present.   Mild mitral regurgitation.   Normal systolic pulmonary artery pressure (SPAP) estimated at 30 mmHg (RA   pressure 8 mmHg).   There is a small circumferential pericardial effusion noted.   10- 55% EF       K 3.3   HTn 16,16,15 x2  LDL 65  7.3.23  EKG:  I have reviewed EKG with the following interpretation:  Normal sinus rhythmRight bundle branch blockLeft anterior fascicular block Bifascicular block Possible Lateral infarct , age undeterminedAbnormal ECGWhen compared with ECG of 08-NOV-2021 18:02,No significant change was found P   Normal sinus rhythmLeft axis deviationRight bundle branch blockAbnormal ECGNo significant change was foundWhen compared with ECG of12.14.17Confirmed by YANA TA, STEVENSON ([id number]) on 11/8/2021 8:36:48 PM     XRAY chest 3.6.24  FINDINGS:  The mediastinum is unremarkable. The cardiac silhouette is moderately  enlarged.  There is pulmonary vascular congestion.  Increased perihilar  interstitial markings suggesting pulmonary edema.     IMPRESSION:  Cardiomegaly with pulmonary vascular congestion and mild pulmonary edema.                Specimen Collected: 03/06/24 05:13 EST Last Resulted: 03/06/24 05:14 EST                Impression:      IMPRESSION:  No acute cardiopulmonary findings                Specimen Collected: 03/05/24 10:16 EST Last Resulted: 03/05/24 10:17 EST                  IMPRESSION:  No acute cardiopulmonary findings                Specimen Collected: 03/05/24 10:16 EST Last Resulted: 03/05/24 10:17 EST                Assessment  Chest pressure  at admission  radiated to right side neck.  She is ruled out for MI.  On  echo  RV enlarged and dysfunction   abnormal  nuclear stress test.  D Dimer

## 2024-03-08 NOTE — FLOWSHEET NOTE
03/07/24 1132   Vital Signs   Temp 97.5 °F (36.4 °C)   Temp Source Oral   Pulse 75   Heart Rate Source Monitor   Respirations 18   BP (!) 147/67   MAP (Calculated) 94   BP Location Left upper arm   BP Method Automatic   Patient Position Semi fowlers   Oxygen Therapy   SpO2 95 %   O2 Device Nasal cannula   O2 Flow Rate (L/min) 3 L/min     Vitals remains stable. Patient is A&O x4 and denies further needs. Call light within reach.

## 2024-03-09 ENCOUNTER — APPOINTMENT (OUTPATIENT)
Dept: GENERAL RADIOLOGY | Age: 69
DRG: 286 | End: 2024-03-09
Attending: INTERNAL MEDICINE
Payer: MEDICARE

## 2024-03-09 LAB
ANION GAP SERPL CALCULATED.3IONS-SCNC: 9 MMOL/L (ref 3–16)
BUN SERPL-MCNC: 7 MG/DL (ref 7–20)
CALCIUM SERPL-MCNC: 9.7 MG/DL (ref 8.3–10.6)
CHLORIDE SERPL-SCNC: 98 MMOL/L (ref 99–110)
CO2 SERPL-SCNC: 31 MMOL/L (ref 21–32)
CREAT SERPL-MCNC: <0.5 MG/DL (ref 0.6–1.2)
DEPRECATED RDW RBC AUTO: 14.8 % (ref 12.4–15.4)
GFR SERPLBLD CREATININE-BSD FMLA CKD-EPI: >60 ML/MIN/{1.73_M2}
GLUCOSE BLD-MCNC: 131 MG/DL (ref 70–99)
GLUCOSE BLD-MCNC: 142 MG/DL (ref 70–99)
GLUCOSE BLD-MCNC: 155 MG/DL (ref 70–99)
GLUCOSE BLD-MCNC: 177 MG/DL (ref 70–99)
GLUCOSE SERPL-MCNC: 120 MG/DL (ref 70–99)
HCT VFR BLD AUTO: 43.9 % (ref 36–48)
HGB BLD-MCNC: 14.4 G/DL (ref 12–16)
MCH RBC QN AUTO: 30.1 PG (ref 26–34)
MCHC RBC AUTO-ENTMCNC: 32.8 G/DL (ref 31–36)
MCV RBC AUTO: 91.5 FL (ref 80–100)
PERFORMED ON: ABNORMAL
PLATELET # BLD AUTO: 200 K/UL (ref 135–450)
PLATELET BLD QL SMEAR: ADEQUATE
PMV BLD AUTO: 8.4 FL (ref 5–10.5)
POTASSIUM SERPL-SCNC: 4.6 MMOL/L (ref 3.5–5.1)
RBC # BLD AUTO: 4.8 M/UL (ref 4–5.2)
SLIDE REVIEW: NORMAL
SODIUM SERPL-SCNC: 138 MMOL/L (ref 136–145)
WBC # BLD AUTO: 8.5 K/UL (ref 4–11)

## 2024-03-09 PROCEDURE — 6370000000 HC RX 637 (ALT 250 FOR IP): Performed by: HOSPITALIST

## 2024-03-09 PROCEDURE — 94640 AIRWAY INHALATION TREATMENT: CPT

## 2024-03-09 PROCEDURE — 2580000003 HC RX 258: Performed by: HOSPITALIST

## 2024-03-09 PROCEDURE — 80048 BASIC METABOLIC PNL TOTAL CA: CPT

## 2024-03-09 PROCEDURE — 85027 COMPLETE CBC AUTOMATED: CPT

## 2024-03-09 PROCEDURE — 2500000003 HC RX 250 WO HCPCS: Performed by: NURSE PRACTITIONER

## 2024-03-09 PROCEDURE — 36415 COLL VENOUS BLD VENIPUNCTURE: CPT

## 2024-03-09 PROCEDURE — 6360000002 HC RX W HCPCS: Performed by: HOSPITALIST

## 2024-03-09 PROCEDURE — 2700000000 HC OXYGEN THERAPY PER DAY

## 2024-03-09 PROCEDURE — 71046 X-RAY EXAM CHEST 2 VIEWS: CPT

## 2024-03-09 PROCEDURE — 1200000000 HC SEMI PRIVATE

## 2024-03-09 RX ORDER — CLONAZEPAM 0.5 MG/1
0.5 TABLET ORAL 2 TIMES DAILY PRN
Status: DISCONTINUED | OUTPATIENT
Start: 2024-03-09 | End: 2024-03-11 | Stop reason: HOSPADM

## 2024-03-09 RX ADMIN — MICONAZOLE NITRATE: 2 POWDER TOPICAL at 08:32

## 2024-03-09 RX ADMIN — Medication 2 PUFF: at 20:34

## 2024-03-09 RX ADMIN — BUPROPION HYDROCHLORIDE 150 MG: 150 TABLET, EXTENDED RELEASE ORAL at 08:32

## 2024-03-09 RX ADMIN — ASPIRIN 325 MG: 325 TABLET ORAL at 08:32

## 2024-03-09 RX ADMIN — GABAPENTIN 100 MG: 100 CAPSULE ORAL at 08:32

## 2024-03-09 RX ADMIN — Medication 10 ML: at 08:32

## 2024-03-09 RX ADMIN — ATORVASTATIN CALCIUM 20 MG: 10 TABLET, FILM COATED ORAL at 20:19

## 2024-03-09 RX ADMIN — MIRTAZAPINE 15 MG: 15 TABLET, FILM COATED ORAL at 20:19

## 2024-03-09 RX ADMIN — CITALOPRAM 40 MG: 20 TABLET, FILM COATED ORAL at 08:32

## 2024-03-09 RX ADMIN — ENOXAPARIN SODIUM 40 MG: 100 INJECTION SUBCUTANEOUS at 08:31

## 2024-03-09 RX ADMIN — GABAPENTIN 100 MG: 100 CAPSULE ORAL at 20:19

## 2024-03-09 RX ADMIN — FUROSEMIDE 40 MG: 40 TABLET ORAL at 08:31

## 2024-03-09 RX ADMIN — Medication 2 PUFF: at 07:37

## 2024-03-09 RX ADMIN — MICONAZOLE NITRATE: 2 POWDER TOPICAL at 20:20

## 2024-03-09 RX ADMIN — GABAPENTIN 100 MG: 100 CAPSULE ORAL at 13:35

## 2024-03-09 RX ADMIN — TIOTROPIUM BROMIDE INHALATION SPRAY 2 PUFF: 3.12 SPRAY, METERED RESPIRATORY (INHALATION) at 07:37

## 2024-03-09 NOTE — PLAN OF CARE
Problem: Chronic Conditions and Co-morbidities  Goal: Patient's chronic conditions and co-morbidity symptoms are monitored and maintained or improved  Outcome: Progressing     Problem: Discharge Planning  Goal: Discharge to home or other facility with appropriate resources  Outcome: Progressing     Problem: Safety - Adult  Goal: Free from fall injury  Outcome: Progressing     Problem: Respiratory - Adult  Goal: Achieves optimal ventilation and oxygenation  Outcome: Progressing     Problem: Cardiovascular - Adult  Goal: Maintains optimal cardiac output and hemodynamic stability  Outcome: Progressing  Goal: Absence of cardiac dysrhythmias or at baseline  Outcome: Progressing     Problem: Metabolic/Fluid and Electrolytes - Adult  Goal: Glucose maintained within prescribed range  Outcome: Progressing

## 2024-03-09 NOTE — PLAN OF CARE
Oxygen documentation:     O2 saturation at REST on ROOM AIR = __94_%     If saturation is 89% or above please proceed with steps 2 and 3..........     O2 saturation with AMBULATION of _200 feet on ROOM AIR = _91_%

## 2024-03-10 LAB
ANION GAP SERPL CALCULATED.3IONS-SCNC: 8 MMOL/L (ref 3–16)
BUN SERPL-MCNC: 7 MG/DL (ref 7–20)
CALCIUM SERPL-MCNC: 9.4 MG/DL (ref 8.3–10.6)
CHLORIDE SERPL-SCNC: 98 MMOL/L (ref 99–110)
CO2 SERPL-SCNC: 31 MMOL/L (ref 21–32)
CREAT SERPL-MCNC: <0.5 MG/DL (ref 0.6–1.2)
GFR SERPLBLD CREATININE-BSD FMLA CKD-EPI: >60 ML/MIN/{1.73_M2}
GLUCOSE BLD-MCNC: 116 MG/DL (ref 70–99)
GLUCOSE BLD-MCNC: 122 MG/DL (ref 70–99)
GLUCOSE BLD-MCNC: 131 MG/DL (ref 70–99)
GLUCOSE BLD-MCNC: 148 MG/DL (ref 70–99)
GLUCOSE SERPL-MCNC: 96 MG/DL (ref 70–99)
MAGNESIUM SERPL-MCNC: 1.9 MG/DL (ref 1.8–2.4)
PERFORMED ON: ABNORMAL
POTASSIUM SERPL-SCNC: 4 MMOL/L (ref 3.5–5.1)
SODIUM SERPL-SCNC: 137 MMOL/L (ref 136–145)

## 2024-03-10 PROCEDURE — 99222 1ST HOSP IP/OBS MODERATE 55: CPT | Performed by: INTERNAL MEDICINE

## 2024-03-10 PROCEDURE — 6370000000 HC RX 637 (ALT 250 FOR IP): Performed by: HOSPITALIST

## 2024-03-10 PROCEDURE — 80048 BASIC METABOLIC PNL TOTAL CA: CPT

## 2024-03-10 PROCEDURE — 5A09357 ASSISTANCE WITH RESPIRATORY VENTILATION, LESS THAN 24 CONSECUTIVE HOURS, CONTINUOUS POSITIVE AIRWAY PRESSURE: ICD-10-PCS | Performed by: INTERNAL MEDICINE

## 2024-03-10 PROCEDURE — 1200000000 HC SEMI PRIVATE

## 2024-03-10 PROCEDURE — 83036 HEMOGLOBIN GLYCOSYLATED A1C: CPT

## 2024-03-10 PROCEDURE — 94761 N-INVAS EAR/PLS OXIMETRY MLT: CPT

## 2024-03-10 PROCEDURE — 83735 ASSAY OF MAGNESIUM: CPT

## 2024-03-10 PROCEDURE — 6360000002 HC RX W HCPCS: Performed by: HOSPITALIST

## 2024-03-10 PROCEDURE — 94660 CPAP INITIATION&MGMT: CPT

## 2024-03-10 PROCEDURE — 36415 COLL VENOUS BLD VENIPUNCTURE: CPT

## 2024-03-10 PROCEDURE — 94640 AIRWAY INHALATION TREATMENT: CPT

## 2024-03-10 PROCEDURE — 94762 N-INVAS EAR/PLS OXIMTRY CONT: CPT

## 2024-03-10 RX ADMIN — TIOTROPIUM BROMIDE INHALATION SPRAY 2 PUFF: 3.12 SPRAY, METERED RESPIRATORY (INHALATION) at 08:52

## 2024-03-10 RX ADMIN — Medication 2 PUFF: at 08:51

## 2024-03-10 RX ADMIN — MICONAZOLE NITRATE: 2 POWDER TOPICAL at 09:21

## 2024-03-10 RX ADMIN — GABAPENTIN 100 MG: 100 CAPSULE ORAL at 15:40

## 2024-03-10 RX ADMIN — MIRTAZAPINE 15 MG: 15 TABLET, FILM COATED ORAL at 21:09

## 2024-03-10 RX ADMIN — BUPROPION HYDROCHLORIDE 150 MG: 150 TABLET, EXTENDED RELEASE ORAL at 09:21

## 2024-03-10 RX ADMIN — ASPIRIN 325 MG: 325 TABLET ORAL at 09:21

## 2024-03-10 RX ADMIN — FUROSEMIDE 40 MG: 40 TABLET ORAL at 09:21

## 2024-03-10 RX ADMIN — GABAPENTIN 100 MG: 100 CAPSULE ORAL at 09:21

## 2024-03-10 RX ADMIN — ATORVASTATIN CALCIUM 20 MG: 10 TABLET, FILM COATED ORAL at 21:09

## 2024-03-10 RX ADMIN — GABAPENTIN 100 MG: 100 CAPSULE ORAL at 21:09

## 2024-03-10 RX ADMIN — Medication 2 PUFF: at 19:02

## 2024-03-10 RX ADMIN — ENOXAPARIN SODIUM 40 MG: 100 INJECTION SUBCUTANEOUS at 09:20

## 2024-03-10 RX ADMIN — CITALOPRAM 40 MG: 20 TABLET, FILM COATED ORAL at 09:21

## 2024-03-10 ASSESSMENT — PAIN SCALES - GENERAL: PAINLEVEL_OUTOF10: 0

## 2024-03-10 NOTE — PLAN OF CARE
Problem: Safety - Adult  Goal: Free from fall injury  3/10/2024 0953 by Suma Mcnally, RN  Outcome: Progressing  Flowsheets (Taken 3/10/2024 0953)  Free From Fall Injury:   Based on caregiver fall risk screen, instruct family/caregiver to ask for assistance with transferring infant if caregiver noted to have fall risk factors   Instruct family/caregiver on patient safety

## 2024-03-10 NOTE — CONSULTS
PULMONARY AND CRITICAL CARE INPATIENT NOTE        Samina HECK Derick   : 1955  MRN: [id number]     Admitting Physician: Linda Salazar MD  Attending Physician: Kofi Workman MD  PCP: Buck Tavares MD    Admission: 3/8/2024   Date of Service: 3/10/2024    No chief complaint on file.          ASSESSMENT & PLAN       69 y.o. pleasant  female patient with:    Hospital Problems             Last Modified POA    * (Principal) Acute hypoxic respiratory failure (HCC) 3/8/2024 Yes    Chronic obstructive pulmonary disease (HCC) 3/8/2024 Yes    Hypertension 3/8/2024 Yes    GERD (gastroesophageal reflux disease) 3/8/2024 Yes    Hypercholesterolemia 3/8/2024 Yes    Overview Signed 2015  4:47 AM by Ambulatory, Admin     replace inactive diagnosis         Depression 3/8/2024 Yes    Status post cardiac catheterization 3/8/2024 Yes          # Acute hypoxic respiratory failure  # Acute pulmonary edema  # Mild coronary artery disease  # HFpEF with RV dysfunction  # COPD  # Likely undiagnosed sleep apnea  # Severe nocturnal hypoxemia  # Tobacco abuse, more than 50 pack years  Discussed with the patient diagnostic process for obstructive sleep apnea and the need to do in lab titration study considering her nocturnal hypoxemia.  Patient refused to stay overnight for overnight pulse oximetry to qualify her for nocturnal oxygen pending her sleep study despite explanation of the risks of serious life-threatening arrhythmias and cardiopulmonary complications  Patient did not qualify for oxygen on the walk test or at rest  Patient wanted to leave and call her PCP and Dr. Meng in the morning to get the oxygen and sleep study arranged as outpatient was not willing to wait  Education provided on positional therapy for sleep apnea with sleeping on the side and head of bed elevation as her nocturnal hypoxemia yesterday was mainly when she was laying sleeping on her back.  Smoking cessation counseling provided  Resume home [place]

## 2024-03-11 VITALS
WEIGHT: 214.8 LBS | BODY MASS INDEX: 34.52 KG/M2 | SYSTOLIC BLOOD PRESSURE: 131 MMHG | HEART RATE: 84 BPM | DIASTOLIC BLOOD PRESSURE: 88 MMHG | TEMPERATURE: 98.3 F | OXYGEN SATURATION: 93 % | RESPIRATION RATE: 18 BRPM | HEIGHT: 66 IN

## 2024-03-11 PROBLEM — E66.811 CLASS 1 OBESITY DUE TO EXCESS CALORIES WITH SERIOUS COMORBIDITY AND BODY MASS INDEX (BMI) OF 34.0 TO 34.9 IN ADULT: Status: ACTIVE | Noted: 2024-03-11

## 2024-03-11 PROBLEM — R09.89 PULMONARY VENOUS CONGESTION: Status: ACTIVE | Noted: 2024-03-11

## 2024-03-11 PROBLEM — Z79.899 CHRONIC PRESCRIPTION BENZODIAZEPINE USE: Status: ACTIVE | Noted: 2024-03-11

## 2024-03-11 PROBLEM — I51.89 GRADE I DIASTOLIC DYSFUNCTION: Status: ACTIVE | Noted: 2024-03-11

## 2024-03-11 PROBLEM — Z79.891 CHRONIC PRESCRIPTION OPIATE USE: Status: ACTIVE | Noted: 2024-03-11

## 2024-03-11 PROBLEM — R06.02 SHORTNESS OF BREATH: Status: ACTIVE | Noted: 2024-03-11

## 2024-03-11 PROBLEM — G47.30 OBSERVED SLEEP APNEA: Status: ACTIVE | Noted: 2024-03-11

## 2024-03-11 PROBLEM — E66.09 CLASS 1 OBESITY DUE TO EXCESS CALORIES WITH SERIOUS COMORBIDITY AND BODY MASS INDEX (BMI) OF 34.0 TO 34.9 IN ADULT: Status: ACTIVE | Noted: 2024-03-11

## 2024-03-11 LAB
ANION GAP SERPL CALCULATED.3IONS-SCNC: 6 MMOL/L (ref 3–16)
BUN SERPL-MCNC: 10 MG/DL (ref 7–20)
CALCIUM SERPL-MCNC: 9.5 MG/DL (ref 8.3–10.6)
CHLORIDE SERPL-SCNC: 100 MMOL/L (ref 99–110)
CO2 SERPL-SCNC: 34 MMOL/L (ref 21–32)
CREAT SERPL-MCNC: <0.5 MG/DL (ref 0.6–1.2)
EST. AVERAGE GLUCOSE BLD GHB EST-MCNC: 128.4 MG/DL
GFR SERPLBLD CREATININE-BSD FMLA CKD-EPI: >60 ML/MIN/{1.73_M2}
GLUCOSE BLD-MCNC: 118 MG/DL (ref 70–99)
GLUCOSE BLD-MCNC: 121 MG/DL (ref 70–99)
GLUCOSE SERPL-MCNC: 108 MG/DL (ref 70–99)
HBA1C MFR BLD: 6.1 %
MAGNESIUM SERPL-MCNC: 1.8 MG/DL (ref 1.8–2.4)
PERFORMED ON: ABNORMAL
PERFORMED ON: ABNORMAL
POTASSIUM SERPL-SCNC: 3.8 MMOL/L (ref 3.5–5.1)
SODIUM SERPL-SCNC: 140 MMOL/L (ref 136–145)

## 2024-03-11 PROCEDURE — 83735 ASSAY OF MAGNESIUM: CPT

## 2024-03-11 PROCEDURE — 94640 AIRWAY INHALATION TREATMENT: CPT

## 2024-03-11 PROCEDURE — 94761 N-INVAS EAR/PLS OXIMETRY MLT: CPT

## 2024-03-11 PROCEDURE — 80048 BASIC METABOLIC PNL TOTAL CA: CPT

## 2024-03-11 PROCEDURE — 36415 COLL VENOUS BLD VENIPUNCTURE: CPT

## 2024-03-11 PROCEDURE — 2700000000 HC OXYGEN THERAPY PER DAY

## 2024-03-11 PROCEDURE — 6370000000 HC RX 637 (ALT 250 FOR IP): Performed by: HOSPITALIST

## 2024-03-11 PROCEDURE — 99232 SBSQ HOSP IP/OBS MODERATE 35: CPT | Performed by: INTERNAL MEDICINE

## 2024-03-11 PROCEDURE — 6360000002 HC RX W HCPCS: Performed by: HOSPITALIST

## 2024-03-11 RX ADMIN — TIOTROPIUM BROMIDE INHALATION SPRAY 2 PUFF: 3.12 SPRAY, METERED RESPIRATORY (INHALATION) at 09:11

## 2024-03-11 RX ADMIN — GABAPENTIN 100 MG: 100 CAPSULE ORAL at 09:05

## 2024-03-11 RX ADMIN — Medication 2 PUFF: at 09:11

## 2024-03-11 RX ADMIN — ENOXAPARIN SODIUM 40 MG: 100 INJECTION SUBCUTANEOUS at 09:05

## 2024-03-11 RX ADMIN — BUPROPION HYDROCHLORIDE 150 MG: 150 TABLET, EXTENDED RELEASE ORAL at 09:05

## 2024-03-11 RX ADMIN — CITALOPRAM 40 MG: 20 TABLET, FILM COATED ORAL at 09:05

## 2024-03-11 RX ADMIN — ASPIRIN 325 MG: 325 TABLET ORAL at 09:05

## 2024-03-11 RX ADMIN — FUROSEMIDE 40 MG: 40 TABLET ORAL at 09:05

## 2024-03-11 NOTE — CARE COORDINATION
CASE MANAGEMENT DISCHARGE SUMMARY      Discharge to: Home with family    Precertification completed: N/A  Hospital Exemption Notification (HENS) completed: N/A    IMM given: (date) 3/11/24    New Durable Medical Equipment ordered/agency: Oxygen through [facility]    Transportation:    Family/car: family/private car       Confirmed discharge plan with: Patient going home with oxygen for HS through [facility]. Recommendations for out patent sleep study     Patient: yes     Family: Patient has called family herself     Facility/Agency, name: [facility] has all testing in their possession         RN, name: Aster    Note: Discharging nurse to complete OTILIA, reconcile AVS, and place final copy with patient's discharge packet. RN to ensure that written prescriptions for  Level II medications are sent with patient to the facility as per protocol.    .Alona Chavarria RN    
CM update: LOS # 3 Patient passed walk test but failed over night oximetry readings. Call placed to [facility] for home O2 for HS.Alona Chavarria RN     
Identified Issues/Barriers to RETURNING to current housing: none currently identified  Potential Assistance needed at discharge: (P) N/A            Potential DME:    Patient expects to discharge to: (P) House  Plan for transportation at discharge:       Financial     Payor: [facility] MEDICARE / Plan: ANTHANNETTE MEDIBLUE ESSENTIAL/PLUS / Product Type: *No Product type* /      Does insurance require precert for SNF: Yes     Potential assistance Purchasing Medications: (P) No  Meds-to-Beds request:          [address] [phone] - F [phone]  [address] [facility] [address]  Phone: [phone] Fax: [phone]     [address] [phone] - F [phone]  [address] [facility] [address]  Phone: [phone] Fax: [phone]        Notes:     Factors facilitating achievement of predicted outcomes: Family support, Motivated, Cooperative, and Pleasant     Barriers to discharge: Pain     Additional Case Management Notes: met with patient at bedside and discussed role of CM. IPTA and expects to d/c home with family support. Will follow for any discharge needs     The Plan for Transition of Care is related to the following treatment goals of Chest pain [R07.9]  Chest pain, unspecified type [R07.9]     IF APPLICABLE: The Patient and/or patient representative Samina and her family were provided with a choice of provider and agrees with the discharge plan. Freedom of choice list with basic dialogue that supports the patient's individualized plan of care/goals and shares the quality data associated with the providers was provided to:     Patient Representative Name:        The Patient and/or Patient Representative Agree with the Discharge Plan?      Alona Chavarria RN  Case Management Department  Ph: [phone]

## 2024-03-11 NOTE — PROGRESS NOTES
03/10/24 0303   NIV Type   $NIV $Daily Charge   NIV Started/Stopped On   Equipment Type v60   Mode Bilevel   Mask Type Full face mask   Mask Size Small   Assessment   Pulse 77   Respirations 18   SpO2 98 %   Comfort Level Good   Using Accessory Muscles No   Mask Compliance Good   Skin Assessment Clean, dry, & intact   Skin Protection for O2 Device Yes   Location Nose   Settings/Measurements   PIP Observed 8 cm H20  (ramp applied for comfort)   IPAP 10 cmH20   CPAP/EPAP 6 cmH2O   Vt (Measured) 472 mL   Rate Ordered 12   Insp Rise Time (%) 2 %   FiO2  28 %   I Time/ I Time % 1 s   Minute Volume (L/min) 8.3 Liters   Mask Leak (lpm) 0 lpm   Patient's Home Machine No   Alarm Settings   Alarms On Y   Low Pressure (cmH2O) 6 cmH2O   High Pressure (cmH2O) 30 cmH2O   Delay Alarm 20 sec(s)   RR Low (bpm) 6   RR High (bpm) 40 br/min       
   03/10/24 0615   NIV Type   NIV Started/Stopped On   Equipment Type v60   Mode Bilevel   Mask Type Full face mask   Mask Size Small   Assessment   Respirations 13   Comfort Level Good   Using Accessory Muscles No   Mask Compliance Good   Skin Assessment Clean, dry, & intact   Skin Protection for O2 Device Yes   Location Nose   Settings/Measurements   PIP Observed 11 cm H20   IPAP 10 cmH20   CPAP/EPAP 6 cmH2O   Vt (Measured) 273 mL   Rate Ordered 12   Insp Rise Time (%) 2 %   FiO2  28 %   I Time/ I Time % 1 s   Minute Volume (L/min) 3.7 Liters   Mask Leak (lpm) 0 lpm   Patient's Home Machine No   Alarm Settings   Alarms On Y   Low Pressure (cmH2O) 6 cmH2O   High Pressure (cmH2O) 30 cmH2O   Delay Alarm 20 sec(s)   RR Low (bpm) 6   RR High (bpm) 40 br/min       
  Brief Pre-Op Note/Sedation Assessment      Samina HECK Derick  1955  [id number]  12:31 PM    Planned Procedure: Cardiac Catheterization Procedure  Post Procedure Plan: Return to same level of care  Consent: I have discussed with the patient and/or the patient representative the indication, alternatives, and the possible risks and/or complications of the planned procedure and the anesthesia methods. The patient and/or patient representative appear to understand and agree to proceed.    DISCUSSION OF CARDIAC CATHETERIZATION PROCEDURES: The procedures, indications, risks and alternatives have been discussed with the patient and, as appropriate, with the patient's guardian . Risks discussed included, but are not limited to, bleeding, development of blood clots/emboli, damage to blood vessels, renal failure, malignant cardiac arrhythmias, stroke, heart attack, emergent coronary bypass surgery, death, dye allergy.  The patient (and guardian as appropriate) expressed understanding of the aforementioned and wished to proceed.        Chief Complaint:   Chest Pain/Pressure      Indications for Cath Procedure:  Presentation:  ACS > 24 hrs  2.  Anginal Classification within 2 weeks:  CCS IV - Inability to perform any activity without angina or angina at rest, i.e., severe limitation  3.  Angina Symptoms Assessment:  Typical Chest Pain  4.  Heart Failure Class within last 2 weeks:  No symptoms  5.  Cardiovascular Instability:  No    Prior Ischemic Workup/Eval:  Pre-Procedural Medications: Yes: Aspirin and STATIN  2.   Stress Test Completed?  Yes:  Stress or Imaging Studies Performed (within ANY time period):   Type:  Stress Nuclear  Results:  Positive:  Myocardial Perfusion Defects (Nuclear) Extent of Ischemia:  High Risk (>3% annual death or MI)    Does Patient need surgery?  Cath Valve Surgery:  No    Pre-Procedure Medical History:  Vital Signs:  120/78  HR 77    Allergies:    Allergies   Allergen Reactions    Azithromycin 
  Hospital Medicine Progress Note      Date of Admission: 3/8/2024  Hospital Day: 2    Chief Admission Complaint:      SOB after cardiac craterization     Subjective:    Patient is lying in bed.  at bedside. She tells me she feels less SOB and back to baseline today. She is eager to go home. We discuss that we need to figure out her O2 needs for home and then we can discuss d/c. She agrees with this POC.    Presenting Admission History:       Samina Sepulveda is a 69 y.o. female with pmh of essential hypertension, hyperlipidemia, COPD, diastolic CHF, GERD who presents with above complaint.  Patient had abnormal stress test and underwent cardiac catheterization today and was found to have mild CAD and was recommended for medical management. After cardiac catheterization she complained of shortness of breath, requiring oxygen via nasal and hospitalist team was contacted for admission/further management.     Assessment/Plan:      Current Principal Problem:  Acute hypoxic respiratory failure (HCC)    Possible acute on chronic DCHF with preserved EF of 55-60% per EF on 3/6/2024  Mild CAD/ASHD per [facility] 3/8/24 with Dr. Castaneda  Acute pulmonary edema  - Continue lasix PO today  - Continue ASA, Statin and no Beta Blocker d/t COPD  - CXR revealed pulmonary edema on 3/6/24.   - Repeat CXR today shows no acute process and resolution of pulmonary edema  - CHF order set in place    Acute hypoxemia respiratory failure on presentation to [facility]  H/O COPD  - Patient is no O2 at home  - She was noted to be hypoxic at [facility] with O2 of 86% on RA  - Will evaluate for O2 needs at home.  - Continue supplemental O2  with goal of 90-94%  - Continue Dulera and Spiriva    Diabetes type II, suboptimal control  - A1C noted to be 6.5 11./8/23. Repeat in am  - Hold home regimen while admitted, anticipate resuming on discharge.    - MDSSI with ACHS FSBS checks.  - Glucose will be monitored closely for hypoglycemic adverse drug reaction.    - Carb 
0101: PM NP notified that pt was 78% on RA while asleep. Pt appears to have sleep apnea. Pt denies diagnosis of sleep apnea but was told this week that she likely had sleep apnea. Pt denies use of CPAP or BiPAP at home. Pt placed on 2L nasal cannula and continuous pulse ox. PM NP placed pulm consult and will order CPAP if pt continues to drop below 90% while asleep.    0120: PM NP notified that pt intermittently dropping to 84% on 2L while asleep. Orders placed and RT notified.   
4 Eyes Skin Assessment     NAME:  Samina Hernandezn  YOB: 1955  MEDICAL RECORD NUMBER:  [id number]    The patient is being assessed for  Admission    I agree that at least one RN has performed a thorough Head to Toe Skin Assessment on the patient. ALL assessment sites listed below have been assessed.      Areas assessed by both nurses:    Head, Face, Ears, Shoulders, Back, Chest, Arms, Elbows, Hands, Sacrum. Buttock, Coccyx, Ischium, Legs. Feet and Heels, and Under Medical Devices         Does the Patient have a Wound? No noted wound(s); excoriation to abdominal fold       Khadar Prevention initiated by RN: No  Wound Care Orders initiated by RN: No    Pressure Injury (Stage 3,4, Unstageable, DTI, NWPT, and Complex wounds) if present, place Wound referral order by RN under : No    New Ostomies, if present place, Ostomy referral order under : No     Nurse 1 eSignature: Electronically signed by Darrian Solorzano RN on 3/8/24 at 10:40 PM EST    **SHARE this note so that the co-signing nurse can place an eSignature**    Nurse 2 eSignature: Electronically signed by Alta White RN on 3/9/24 at 12:29 AM EST   
Consult called to pulmonology via phone; requested by MD to send in perfectserve as well.  Done.     Electronically signed by Suma Mcnally RN on 3/10/2024 at 9:48 AM   
Cross cover notified that patient desating to 78% on room air while sleeping. Patient placed on 2 LNC and intermittently decreasing to 87%. Patient stated she has never been diagnosed with sleep apnea. Pulmonology consulted for evaluation. Cpap will be ordered if patient continues to desat.   Nasra Rangel, APRN - CNP    
OVERNIGHT PULSE OXIMETRY STUDY PLACED IN SOFT CHART.  
Oxygen documentation:      O2 saturation at REST on ROOM AIR = __94____%      If saturation is 89% or above please proceed with steps 2 and 3……….      O2 saturation with AMBULATION of _200___ feet on ROOM AIR = __90___%  O2 saturation with AMBULATION on current liter flow = ___n\a___%      DCP notified: ______      Signature     Electronically signed by Suma Mcnally RN on 3/10/2024 at 2:17 PM   
Pt d/c'd home with spouse.  Removed peripheral IV and stopped bleeding.  Catheter intact. Pt tolerated well. No redness noted at site.  Notified CMU and removed tele box. Reviewed d/c instructions, home meds, and  f/u information utilizing teach-back method.  No scripts given to patient. Patient verbalized understanding. Electronically signed by Aster Maciel RN on 3/11/2024 at 12:08 PM   
Pt noted to have SpO2 drop intermittently to 78% and increase back to 94% within 2-3 seconds. Pt frequently desats to 85-86% but almost immediately increases back to 94% while asleep. PM NP notified as well as RT charge. Ok to leave off of oxygen unless pt sustaining desaturation at this time.   
Pt sustaining 84-87% on RA. Placed on 2L. Jadyn RT notified.   
Pt. Resting in bed. Alert/oriented.  Vitals and assessment stable as charted.  She is now 90-91% RA at rest.  Denies any pain/nausea or any other discomfort at present time.  Adamant she is discharging home today regardless.  Call light in reach.  Will continue to monitor.    
Removed pt dentures prior to bipap, placed in blue denture cup.  RN aware.   
Report given to Araceli Ramachandran on A1.  Patient transfer to 108.  
Report given to patients nurse. Pt transferred to room 366. CMU called and monitor is on and verified.  
40 mg Oral Daily    gabapentin  100 mg Oral TID    mirtazapine  15 mg Oral Nightly    insulin lispro  0-8 Units SubCUTAneous TID WC    insulin lispro  0-4 Units SubCUTAneous Nightly    furosemide  40 mg IntraVENous Once    mometasone-formoterol  2 puff Inhalation BID RT    tiotropium  2 puff Inhalation Daily RT    miconazole   Topical BID     PRN Meds: clonazePAM, sodium chloride flush, sodium chloride, sodium chloride flush, sodium chloride, potassium chloride **OR** potassium alternative oral replacement **OR** potassium chloride, magnesium sulfate, ondansetron **OR** ondansetron, polyethylene glycol, acetaminophen **OR** acetaminophen, glucose, dextrose bolus **OR** dextrose bolus, glucagon (rDNA), dextrose, ipratropium 0.5 mg-albuterol 2.5 mg     Labs:  Personally reviewed and interpreted for clinical significance.     Recent Labs     03/08/24 0438 03/09/24  0804   WBC 7.1 8.5   HGB 14.3 14.4   HCT 43.1 43.9    200       Recent Labs     03/08/24 0438 03/09/24  0804 03/10/24  0706    138 137   K 3.4* 4.6 4.0   CL 97* 98* 98*   CO2 34* 31 31   BUN 9 7 7   CREATININE <0.5* <0.5* <0.5*   CALCIUM 9.0 9.7 9.4   MG 2.00  --  1.90       No results for input(s): \"PROBNP\", \"TROPHS\" in the last 72 hours.  No results for input(s): \"LABA1C\" in the last 72 hours.  No results for input(s): \"AST\", \"ALT\", \"BILIDIR\", \"BILITOT\", \"ALKPHOS\" in the last 72 hours.  No results for input(s): \"INR\", \"LACTA\", \"TSH\" in the last 72 hours.    Urine Cultures:   Lab Results   Component Value Date/Time    LABURIN No growth at 18 to 36 hours 02/16/2023 12:11 PM     Blood Cultures: No results found for: \"BC\"  No results found for: \"BLOODCULT2\"  Organism:   Lab Results   Component Value Date/Time    ORG Escherichia coli 01/02/2020 06:21 PM         FARHAT Bryan - CNP   
Problems:    * No resolved hospital problems. *          Plan:   Oxygen supplementation to keep saturating 90-94% only  Please titrate oxygen as per the above parameters  Please eval the patient for home oxygen  Pulmonary toilet  Keep negative fluid balance  Patient is on Lasix which can be continued  Monitor input output and BMP  Correct electrolytes on whenever necessary basis  Clinically patient appears to have LIANG like picture and patient will benefit from HST  Nicotine replacement therapy if the patient wants  Patient also is on chronic narcotics and benzodiazepine which can be counterproductive  Monitor for any hypoventilation and hypercarbia  Bronchodilators to continue  No need for any antibiotics or steroids systemically from pulmonary standpoint of view  Monitor and evaluation and management of hyperglycemia and diabetes as per primary team  PUD and DVT prophylaxis as per primary team    ?Discharge planning    No other recommendations from pulmonary/critical care standpoint of view-will sign off-please call on whenever necessary basis if the patient not discharged    Patient to follow-up with Dr. Meng at [facility] pulmonology 2 to 3 weeks after discharge    Case d/w nursing         Electronically signed by:  EVELIN ANDREWS MD    3/11/2024    9:40 AM.

## 2024-03-11 NOTE — DISCHARGE INSTRUCTIONS
You will need a sleep study for Obstructive Sleep Apnea. Dr. Pham's office should contact you to schedule appt. Please call them if you do not hear from them by Thursday 3/14 at [phone]      Heart Failure Resources:  Heart Failure Interactive Workbook:  Go to https://[place].Viveve/publication/?z=733569 for a Free Heart Failure Interactive Workbook provided by The American Heart Association. This interactive workbook will provide information on Healthier Living with Heart Failure. Please copy and paste link into search bar. Use your mouse to scroll through the pages.    HF [place] diane:   Heart Failure Free smart phone diane available for iPhone and Android download. Use your phone to track sodium intake, fluid intake, symptoms, and weight.     Low Sodium Diet / Recipes:  Go to www.[employer] website for “renal” diet which is Low Sodium! [employer] is a dialysis company, but this website offers free seasonal cookbooks. Each quarter, they will release 25-30 new recipes with a breakdown of calories, sodium, and glucose. You can also go to www.[employer]/recipes website for free recipes.     Discharge Instruction Video:  Scan the QR code below with your camera and click the canva.com link to open the video and watch educational information on Heart Failure and Medications from one of our nurses.   https://www.Strava/design/DAFZnsH_JRk/7PlhfipFYKXjxELseS6xnx/edit    Home Exercise Program:   Identification of Green/Yellow/Red zones:  You should be able to identify when you feel good (green zone), if you have 1-2 symptoms of HF (yellow zone), or if you are in need of medical attention (red zone).  In your CHF education folder you were provided a “stop light tool” to outline this information.     We want to you to rate your exertion levels:    Our therapy team has discussed means of identification with you such as the \"Akua scale.\"  The Akua rating scale ranges from 6 to 20, where 6 means \"no exertion at

## 2024-03-11 NOTE — DISCHARGE SUMMARY
last 72 hours.  No results for input(s): \"INR\", \"LACTA\", \"TSH\" in the last 72 hours.    Urine Cultures:   Lab Results   Component Value Date/Time    LABURIN No growth at 18 to 36 hours 02/16/2023 12:11 PM     Blood Cultures: No results found for: \"BC\"  No results found for: \"BLOODCULT2\"  Organism:   Lab Results   Component Value Date/Time    ORG Escherichia coli 01/02/2020 06:21 PM       Signed:    Hellen Robert, FARHAT - CNP

## 2024-03-12 ENCOUNTER — CARE COORDINATION (OUTPATIENT)
Dept: CASE MANAGEMENT | Age: 69
End: 2024-03-12

## 2024-03-12 DIAGNOSIS — R06.02 SHORTNESS OF BREATH: Primary | ICD-10-CM

## 2024-03-12 PROCEDURE — 1111F DSCHRG MED/CURRENT MED MERGE: CPT | Performed by: FAMILY MEDICINE

## 2024-03-12 NOTE — CARE COORDINATION
information.  Plan for follow-up call in 5-7 days based on severity of symptoms and risk factors.  Plan for next call: self management-     Awa Paz RN

## 2024-03-13 DIAGNOSIS — F41.0 PANIC DISORDER: ICD-10-CM

## 2024-03-13 RX ORDER — CLONAZEPAM 0.5 MG/1
TABLET ORAL
Qty: 60 TABLET | Refills: 0 | Status: SHIPPED | OUTPATIENT
Start: 2024-03-13 | End: 2024-04-12

## 2024-03-13 NOTE — TELEPHONE ENCOUNTER
Date of last refill of this med was 2/12/2024, # of pills given 60 and # of refills given 0.  Their next appointment is 3/14/2024, the last date patient was seen was 1/17/2024.  Does patient have medication agreement on file? No  Has drug screen been done in last 12 months if needed? no  Will mail med contract

## 2024-03-14 ENCOUNTER — OFFICE VISIT (OUTPATIENT)
Dept: FAMILY MEDICINE CLINIC | Age: 69
End: 2024-03-14

## 2024-03-14 VITALS
HEART RATE: 80 BPM | OXYGEN SATURATION: 93 % | DIASTOLIC BLOOD PRESSURE: 84 MMHG | HEIGHT: 66 IN | WEIGHT: 218 LBS | SYSTOLIC BLOOD PRESSURE: 130 MMHG | BODY MASS INDEX: 35.03 KG/M2

## 2024-03-14 DIAGNOSIS — Z12.31 ENCOUNTER FOR SCREENING MAMMOGRAM FOR BREAST CANCER: ICD-10-CM

## 2024-03-14 DIAGNOSIS — E78.5 HYPERLIPIDEMIA WITH TARGET LDL LESS THAN 130: ICD-10-CM

## 2024-03-14 DIAGNOSIS — H26.9 CATARACT OF BOTH EYES, UNSPECIFIED CATARACT TYPE: ICD-10-CM

## 2024-03-14 DIAGNOSIS — I10 ESSENTIAL HYPERTENSION: ICD-10-CM

## 2024-03-14 DIAGNOSIS — E11.9 TYPE 2 DIABETES MELLITUS WITHOUT COMPLICATION, WITHOUT LONG-TERM CURRENT USE OF INSULIN (HCC): ICD-10-CM

## 2024-03-14 DIAGNOSIS — I25.10 CORONARY ARTERY DISEASE INVOLVING NATIVE CORONARY ARTERY OF NATIVE HEART WITHOUT ANGINA PECTORIS: ICD-10-CM

## 2024-03-14 DIAGNOSIS — J96.11 HYPOXEMIC RESPIRATORY FAILURE, CHRONIC (HCC): ICD-10-CM

## 2024-03-14 DIAGNOSIS — J44.9 CHRONIC OBSTRUCTIVE PULMONARY DISEASE, UNSPECIFIED COPD TYPE (HCC): Primary | ICD-10-CM

## 2024-03-14 DIAGNOSIS — Z01.818 PREOP EXAMINATION: ICD-10-CM

## 2024-03-14 DIAGNOSIS — Z09 HOSPITAL DISCHARGE FOLLOW-UP: ICD-10-CM

## 2024-03-14 RX ORDER — FLUTICASONE FUROATE, UMECLIDINIUM BROMIDE AND VILANTEROL TRIFENATATE 200; 62.5; 25 UG/1; UG/1; UG/1
1 POWDER RESPIRATORY (INHALATION) DAILY
Qty: 3 EACH | Refills: 3 | Status: SHIPPED | OUTPATIENT
Start: 2024-03-14

## 2024-03-14 RX ORDER — FLUTICASONE FUROATE, UMECLIDINIUM BROMIDE AND VILANTEROL TRIFENATATE 200; 62.5; 25 UG/1; UG/1; UG/1
1 POWDER RESPIRATORY (INHALATION) DAILY
Qty: 2 EACH | Refills: 0 | Status: SHIPPED | COMMUNITY
Start: 2024-03-14 | End: 2024-03-14 | Stop reason: SDUPTHER

## 2024-03-14 ASSESSMENT — ENCOUNTER SYMPTOMS
CONSTIPATION: 0
DIARRHEA: 0
SHORTNESS OF BREATH: 1

## 2024-03-14 NOTE — PROGRESS NOTES
DAILY 180 tablet 3    Multiple Vitamins-Minerals (THERAPEUTIC MULTIVITAMIN-MINERALS) tablet Take 1 tablet by mouth daily          Medications patient taking as of now reconciled against medications ordered at time of hospital discharge: Yes    Review of Systems   Constitutional:  Negative for fever.   Respiratory:  Positive for shortness of breath.    Cardiovascular:  Negative for chest pain.   Gastrointestinal:  Negative for constipation and diarrhea.       Objective:    /84   Pulse 80   Ht 1.676 m (5' 6\")   Wt 98.9 kg (218 lb)   SpO2 93%   BMI 35.19 kg/m²   Physical Exam  Constitutional:       Appearance: Normal appearance. She is obese.   HENT:      Head: Normocephalic and atraumatic.   Eyes:      Extraocular Movements: Extraocular movements intact.   Cardiovascular:      Rate and Rhythm: Normal rate and regular rhythm.   Pulmonary:      Effort: Pulmonary effort is normal.      Breath sounds: Decreased breath sounds present.   Abdominal:      Palpations: Abdomen is soft.      Tenderness: There is no abdominal tenderness.   Musculoskeletal:      Right lower leg: Edema (improved) present.      Left lower leg: Edema (improved) present.   Neurological:      General: No focal deficit present.      Mental Status: She is alert and oriented to person, place, and time.   Psychiatric:         Mood and Affect: Mood normal.         Behavior: Behavior normal.       An electronic signature was used to authenticate this note.  --Buck Tavares MD

## 2024-03-15 NOTE — PROGRESS NOTES
Physician Progress Note      PATIENT:               JERRELL ORNELAS  [facility] #:                  [id number]  :                       1955  ADMIT DATE:       3/6/2024 4:14 AM  DISCH DATE:        3/8/2024 11:33 AM  RESPONDING  PROVIDER #:        Carolin Khan DO          QUERY TEXT:    Pt admitted with chest pain.  Pt noted to have per ED presents with chest   pain/pressure since around 1900 hrs. tonight.  There was some radiation to the   jaw and the patient experienced nausea and vomiting. Patient was transferred   for [facility]. If possible, please document in progress notes and discharge summary   if you are evaluating and/or treating any of the following:      The medical record reflects the following:  Risk Factors: Acute Respiratory Failure, Nausea/vomiting, Abdominal pain  Clinical Indicators: pulmonology 3/6  note - Acute hypoxic respiratory   failure: Patient has a low resting SpO2 at baseline of 90 to 93% on room air.    Is possible that with abdominal pain and chest pressure she had some   hypoventilation and hypoxia.    Mild CAD/ASHD per [facility] 3/8/24  EKG without any ischemic changes. ECHO EF 55-60%.    Treatment: Continue aspirin and Lipitor, EKG, echo and stress test, cardiology   and pulmonology consult, Needs outpt LIANG,  [facility] for [facility]    Thank You Sujata Medina RN, CDS [email or web address]  Options provided:  -- Chest pain due to CAD with unstable angina  -- Chest pain due to Acute hypoxic respiratory failure  -- Other - I will add my own diagnosis  -- Disagree - Not applicable / Not valid  -- Disagree - Clinically unable to determine / Unknown  -- Refer to Clinical Documentation Reviewer    PROVIDER RESPONSE TEXT:    Chest pain had resolved. Unknown cause may be due to CHF vs other etiology   possibly GI    Query created by: Sujata Medina on 3/12/2024 9:00 AM      Electronically signed by:  Carolin Khan DO 3/15/2024 7:34 AM

## 2024-03-19 ENCOUNTER — CARE COORDINATION (OUTPATIENT)
Dept: CASE MANAGEMENT | Age: 69
End: 2024-03-19

## 2024-03-19 NOTE — CARE COORDINATION
Care Transitions Follow Up Call    Patient Current Location:  Home: [address] [facility] [address]    Care Transition Nurse contacted the patient by telephone to follow up after admission.  Verified name and  with patient as identifiers.    Patient: Samina HECK Derick  Patient : 1955   MRN: [id number]  Reason for Admission: SOB s/p cardiac cath   PMH HTN, hyperlipidemia, COPD, CHF, GERD  Discharge Date: 3/11/24 RARS: Readmission Risk Score: 11.4      Needs to be reviewed by the provider   Additional needs identified to be addressed with provider: No  none         Method of communication with provider: none.    Patient answered call and verified . Patient pleasant and agreeable to transition call. Continues to wear O2 at HS and naps during the day. Confirmed that she has pulse oximeter and staying above 90%. Patient was seen by PCP for TCM visit and discussed visit. Taking all medications as directed. Patient has appt with pulmonary and noted in system. Denied any pain or any acute needs at present time.  Agreeable to f/u calls.  Educated on the use of urgent care or physician’s 24 hr access line if assistance is needed after hours.    Addressed changes since last contact:  none  Discussed follow-up appointments. If no appointment was previously scheduled, appointment scheduling offered: Yes.   Is follow up appointment scheduled within 7 days of discharge? Yes.    Follow Up  Future Appointments   Date Time Provider Department Center   2024  1:00 PM Dashawn Meng MD [facility]     External follow up appointment(s):     Care Transition Nurse reviewed medical action plan and red flags with patient and discussed any barriers to care and/or understanding of plan of care after discharge. Discussed appropriate site of care based on symptoms and resources available to patient including: PCP  Specialist  When to call 911. The patient agrees to contact the PCP office for questions related to their

## 2024-03-26 ENCOUNTER — CARE COORDINATION (OUTPATIENT)
Dept: CASE MANAGEMENT | Age: 69
End: 2024-03-26

## 2024-03-26 NOTE — CARE COORDINATION
Care Transitions Follow Up Call    Patient: Samina HECK Derick  Patient : 1955   MRN: [id number]  Reason for Admission: SOB s/p cardiac cath   PMH HTN, hyperlipidemia, COPD, CHF, GERD  Discharge Date: 3/11/24 RARS: Readmission Risk Score: 11.4      Attempted to contact patient for follow up transition call. Left voicemail message to return call with an update on condition since discharge. Contact information provided. Will continue to follow up.    Follow Up  Future Appointments   Date Time Provider Department Center   2024  1:00 PM Dashawn Meng MD [facility]       Care Transitions Subsequent and Final Call    Subsequent and Final Calls  Care Transitions Interventions  Other Interventions:             Xiomara Mejia LPN

## 2024-03-29 ENCOUNTER — CARE COORDINATION (OUTPATIENT)
Dept: CASE MANAGEMENT | Age: 69
End: 2024-03-29

## 2024-03-29 NOTE — CARE COORDINATION
Care Transitions Follow Up Call    Patient: Samina HECK Derick  Patient : 1955   MRN: [id number]  Reason for Admission: SOB s/p cardiac cath   [facility] HTN, hyperlipidemia, COPD, CHF, GERD  Discharge Date: 3/11/24 RARS: Readmission Risk Score: 11.4    Second and final attempt made to reach patient for transition call.  VM left stating purpose of call along with my contact information requesting a return call.      Follow Up  Future Appointments   Date Time Provider Department Center   2024  1:00 PM Dashawn Meng MD [facility]        Care Transitions Subsequent and Final Call    Subsequent and Final Calls  Care Transitions Interventions  Other Interventions:             Awa Paz RN

## 2024-04-15 DIAGNOSIS — E11.9 TYPE 2 DIABETES MELLITUS WITHOUT COMPLICATION, WITHOUT LONG-TERM CURRENT USE OF INSULIN (HCC): ICD-10-CM

## 2024-04-15 DIAGNOSIS — M51.36 DDD (DEGENERATIVE DISC DISEASE), LUMBAR: ICD-10-CM

## 2024-04-15 DIAGNOSIS — F41.0 PANIC DISORDER: ICD-10-CM

## 2024-04-15 DIAGNOSIS — R20.8 BURNING SENSATION OF FEET: ICD-10-CM

## 2024-04-15 RX ORDER — CLONAZEPAM 0.5 MG/1
TABLET ORAL
Qty: 60 TABLET | Refills: 0 | Status: SHIPPED | OUTPATIENT
Start: 2024-04-15 | End: 2024-05-15

## 2024-04-15 RX ORDER — GABAPENTIN 100 MG/1
CAPSULE ORAL
Qty: 90 CAPSULE | Refills: 2 | Status: SHIPPED | OUTPATIENT
Start: 2024-04-15 | End: 2024-05-15

## 2024-04-18 ENCOUNTER — OFFICE VISIT (OUTPATIENT)
Dept: PULMONOLOGY | Age: 69
End: 2024-04-18
Payer: MEDICARE

## 2024-04-18 ENCOUNTER — TELEPHONE (OUTPATIENT)
Dept: PULMONOLOGY | Age: 69
End: 2024-04-18

## 2024-04-18 VITALS
HEIGHT: 65 IN | BODY MASS INDEX: 35.49 KG/M2 | WEIGHT: 213 LBS | SYSTOLIC BLOOD PRESSURE: 122 MMHG | DIASTOLIC BLOOD PRESSURE: 78 MMHG | HEART RATE: 98 BPM | OXYGEN SATURATION: 92 %

## 2024-04-18 DIAGNOSIS — Z87.891 PERSONAL HISTORY OF TOBACCO USE: ICD-10-CM

## 2024-04-18 DIAGNOSIS — E66.01 SEVERE OBESITY (BMI 35.0-39.9) WITH COMORBIDITY (HCC): ICD-10-CM

## 2024-04-18 DIAGNOSIS — G47.30 SLEEP APNEA, UNSPECIFIED TYPE: Primary | ICD-10-CM

## 2024-04-18 PROCEDURE — 3078F DIAST BP <80 MM HG: CPT | Performed by: INTERNAL MEDICINE

## 2024-04-18 PROCEDURE — 99214 OFFICE O/P EST MOD 30 MIN: CPT | Performed by: INTERNAL MEDICINE

## 2024-04-18 PROCEDURE — 1123F ACP DISCUSS/DSCN MKR DOCD: CPT | Performed by: INTERNAL MEDICINE

## 2024-04-18 PROCEDURE — 3074F SYST BP LT 130 MM HG: CPT | Performed by: INTERNAL MEDICINE

## 2024-04-18 RX ORDER — FLUTICASONE FUROATE, UMECLIDINIUM BROMIDE AND VILANTEROL TRIFENATATE 200; 62.5; 25 UG/1; UG/1; UG/1
1 POWDER RESPIRATORY (INHALATION) DAILY
Qty: 2 EACH | Refills: 0 | COMMUNITY
Start: 2024-04-18

## 2024-04-18 ASSESSMENT — SLEEP AND FATIGUE QUESTIONNAIRES
NECK CIRCUMFERENCE (INCHES): 14.5
HOW LIKELY ARE YOU TO NOD OFF OR FALL ASLEEP WHILE LYING DOWN TO REST IN THE AFTERNOON WHEN CIRCUMSTANCES PERMIT: HIGH CHANCE OF DOZING
HOW LIKELY ARE YOU TO NOD OFF OR FALL ASLEEP WHILE SITTING AND READING: SLIGHT CHANCE OF DOZING
HOW LIKELY ARE YOU TO NOD OFF OR FALL ASLEEP WHILE SITTING INACTIVE IN A PUBLIC PLACE: WOULD NEVER DOZE
HOW LIKELY ARE YOU TO NOD OFF OR FALL ASLEEP WHILE WATCHING TV: MODERATE CHANCE OF DOZING
HOW LIKELY ARE YOU TO NOD OFF OR FALL ASLEEP WHEN YOU ARE A PASSENGER IN A CAR FOR AN HOUR WITHOUT A BREAK: HIGH CHANCE OF DOZING
HOW LIKELY ARE YOU TO NOD OFF OR FALL ASLEEP WHILE SITTING QUIETLY AFTER LUNCH WITHOUT ALCOHOL: WOULD NEVER DOZE
ESS TOTAL SCORE: 9
HOW LIKELY ARE YOU TO NOD OFF OR FALL ASLEEP WHILE SITTING AND TALKING TO SOMEONE: WOULD NEVER DOZE
HOW LIKELY ARE YOU TO NOD OFF OR FALL ASLEEP IN A CAR, WHILE STOPPED FOR A FEW MINUTES IN TRAFFIC: WOULD NEVER DOZE

## 2024-04-18 NOTE — PROGRESS NOTES
Outpatient Pulmonary Progress Note  CC: Shortness of breath & wheezing    Interval History 4/18/24   Admitted in March 2024 with CHF and COPD.   excessive daytime sleepiness with witnessed apneas at [place] in March.  [place] is 9     Presenting HPI: Samina Sepulveda is a 65 yo with a past medical history liver cirrhosis and emphysema who presents today with a history of increasing severe shortness of breath worse with exertion, associated with chest tightness, treated with Trelegy with improvement.     reports that she has been smoking cigarettes. She has a 90.0 pack-year smoking history. She has never used smokeless tobacco.    PHYSICAL EXAM:  Blood pressure 122/78, pulse 98, height 1.651 m (5' 5\"), weight 96.6 kg (213 lb), SpO2 92 %, not currently breastfeeding.' 258# to 256# --> 213#   Constitutional:  No acute distress.   HENT:  Oropharynx is clear and moist.  Class IV airway  Neck: No tracheal deviation present.  14.5 inches   Cardiovascular: Normal heart sounds.  + lower extremity edema.  Pulmonary/Chest: No wheezes. No rhonchi. No rales. + decreased breath sounds.  No accessory muscle usage or stridor.   Musculoskeletal: No cyanosis. No clubbing.  Skin: Skin is warm and dry.   Psychiatric: Normal mood and affect.  Neurologic: speech fluent, alert and oriented, strength symmetric      DATA:  PFT 12/23/21 FEV1 1.68 L 67% TLC 4.59 L 86%  DLCO 12.69 54%     LDCT for LCS 11/14/23  IMPRESSION:  Emphysema with stable tiny pulmonary nodule right lower lobe   Mild coronary artery calcification    CXR 3/8/24 Clear lung fields    ASSESSMENT:  COPD/pulmonary emphysema  Chronic hypoxemic respiratory failure on home oxygen   RLL PN 3 mm stable Nov 2022 to Nov 2023  Mild excessive daytime sleepiness, subjective sleepiness  Witnessed apnea while at [facility]   Greater than 100-pack-year tobacco history, has cut down from 3 pack down to 2 ppd     PLAN:  Trelegy 200 daily, okay to give 2 samples of trelegy today    PSG in [place]

## 2024-04-25 ENCOUNTER — COMMUNITY OUTREACH (OUTPATIENT)
Dept: FAMILY MEDICINE CLINIC | Age: 69
End: 2024-04-25

## 2024-04-30 ENCOUNTER — TELEPHONE (OUTPATIENT)
Dept: PHARMACY | Facility: CLINIC | Age: 69
End: 2024-04-30

## 2024-04-30 NOTE — TELEPHONE ENCOUNTER
[facility] CLINICAL PHARMACY: ADHERENCE REVIEW  Identified care gap per [place]: fills at [place] : Diabetes adherence    Patient also appears to be prescribed: Statin(filled 3/13/24 90DS)    ASSESSMENT  DIABETES ADHERENCE    Insurance Records claims through 24 (Prior Year PDC = not reported; YTD PDC = FIRST FILL; Potential Fail Date: 24):   Metformin ER 500mg last filled on 24 for 90 day supply. Next refill due: 24    Prescribed si tablet/capsule twice daily      Lab Results   Component Value Date    LABA1C 6.1 03/10/2024    LABA1C 6.5 2023    LABA1C 6.8 2023     NOTE: A1c <9%      The following are interventions that have been identified:   Patient OVERDUE refilling Metformin ER 500mg and active on home medication list.     Attempting to reach patient to review.  Left message asking for return call. Letter sent to patient.    Last Visit: 3/14/24  Next Visit: none      Blanche Diaz CPhT.   [facility] Clinical   [facility]  Toll free: [phone] Option 1     For Pharmacy Admin Tracking Only    Program: [employer]  CPA in place:  No  Gap Closed?: No   Time Spent (min): 15

## 2024-05-08 ENCOUNTER — HOSPITAL ENCOUNTER (OUTPATIENT)
Dept: SLEEP CENTER | Age: 69
Discharge: HOME | End: 2024-05-10
Attending: INTERNAL MEDICINE
Payer: MEDICARE

## 2024-05-08 DIAGNOSIS — G47.30 SLEEP APNEA, UNSPECIFIED TYPE: ICD-10-CM

## 2024-05-08 PROCEDURE — 95810 POLYSOM 6/> YRS 4/> PARAM: CPT

## 2024-05-13 DIAGNOSIS — F41.0 PANIC DISORDER: ICD-10-CM

## 2024-05-13 RX ORDER — CLONAZEPAM 0.5 MG/1
TABLET ORAL
Qty: 60 TABLET | Refills: 0 | Status: SHIPPED | OUTPATIENT
Start: 2024-05-13 | End: 2024-06-12

## 2024-05-20 ENCOUNTER — OFFICE VISIT (OUTPATIENT)
Dept: PULMONOLOGY | Age: 69
End: 2024-05-20
Payer: MEDICARE

## 2024-05-20 ENCOUNTER — TELEPHONE (OUTPATIENT)
Dept: PULMONOLOGY | Age: 69
End: 2024-05-20

## 2024-05-20 VITALS
OXYGEN SATURATION: 85 % | BODY MASS INDEX: 34.07 KG/M2 | RESPIRATION RATE: 19 BRPM | SYSTOLIC BLOOD PRESSURE: 98 MMHG | HEIGHT: 66 IN | WEIGHT: 212 LBS | DIASTOLIC BLOOD PRESSURE: 60 MMHG | HEART RATE: 95 BPM

## 2024-05-20 DIAGNOSIS — J96.11 CHRONIC HYPOXEMIC RESPIRATORY FAILURE (HCC): ICD-10-CM

## 2024-05-20 DIAGNOSIS — G47.33 SEVERE OBSTRUCTIVE SLEEP APNEA: Primary | ICD-10-CM

## 2024-05-20 DIAGNOSIS — J44.9 CHRONIC OBSTRUCTIVE PULMONARY DISEASE, UNSPECIFIED COPD TYPE (HCC): ICD-10-CM

## 2024-05-20 PROBLEM — E66.09 CLASS 1 OBESITY DUE TO EXCESS CALORIES WITH SERIOUS COMORBIDITY AND BODY MASS INDEX (BMI) OF 34.0 TO 34.9 IN ADULT: Chronic | Status: ACTIVE | Noted: 2024-03-11

## 2024-05-20 PROBLEM — J96.01 ACUTE RESPIRATORY FAILURE WITH HYPOXIA (HCC): Status: RESOLVED | Noted: 2024-03-06 | Resolved: 2024-05-20

## 2024-05-20 PROBLEM — G47.30 SLEEP APNEA: Status: RESOLVED | Noted: 2024-03-11 | Resolved: 2024-05-20

## 2024-05-20 PROBLEM — E66.811 CLASS 1 OBESITY DUE TO EXCESS CALORIES WITH SERIOUS COMORBIDITY AND BODY MASS INDEX (BMI) OF 34.0 TO 34.9 IN ADULT: Chronic | Status: ACTIVE | Noted: 2024-03-11

## 2024-05-20 PROBLEM — J96.01 ACUTE HYPOXIC RESPIRATORY FAILURE (HCC): Status: RESOLVED | Noted: 2024-03-08 | Resolved: 2024-05-20

## 2024-05-20 PROCEDURE — 3078F DIAST BP <80 MM HG: CPT

## 2024-05-20 PROCEDURE — 3074F SYST BP LT 130 MM HG: CPT

## 2024-05-20 PROCEDURE — 1123F ACP DISCUSS/DSCN MKR DOCD: CPT

## 2024-05-20 PROCEDURE — 99214 OFFICE O/P EST MOD 30 MIN: CPT

## 2024-05-20 RX ORDER — FLUTICASONE FUROATE, UMECLIDINIUM BROMIDE AND VILANTEROL TRIFENATATE 200; 62.5; 25 UG/1; UG/1; UG/1
1 POWDER RESPIRATORY (INHALATION) DAILY
Qty: 2 EACH | Refills: 0 | COMMUNITY
Start: 2024-05-20

## 2024-05-20 ASSESSMENT — SLEEP AND FATIGUE QUESTIONNAIRES
HOW LIKELY ARE YOU TO NOD OFF OR FALL ASLEEP WHILE WATCHING TV: HIGH CHANCE OF DOZING
HOW LIKELY ARE YOU TO NOD OFF OR FALL ASLEEP WHILE LYING DOWN TO REST IN THE AFTERNOON WHEN CIRCUMSTANCES PERMIT: HIGH CHANCE OF DOZING
ESS TOTAL SCORE: 16
HOW LIKELY ARE YOU TO NOD OFF OR FALL ASLEEP WHILE SITTING AND TALKING TO SOMEONE: MODERATE CHANCE OF DOZING
HOW LIKELY ARE YOU TO NOD OFF OR FALL ASLEEP WHILE SITTING AND READING: MODERATE CHANCE OF DOZING
HOW LIKELY ARE YOU TO NOD OFF OR FALL ASLEEP WHILE SITTING QUIETLY AFTER LUNCH WITHOUT ALCOHOL: MODERATE CHANCE OF DOZING
NECK CIRCUMFERENCE (INCHES): 15.5
HOW LIKELY ARE YOU TO NOD OFF OR FALL ASLEEP WHEN YOU ARE A PASSENGER IN A CAR FOR AN HOUR WITHOUT A BREAK: MODERATE CHANCE OF DOZING
HOW LIKELY ARE YOU TO NOD OFF OR FALL ASLEEP IN A CAR, WHILE STOPPED FOR A FEW MINUTES IN TRAFFIC: WOULD NEVER DOZE
HOW LIKELY ARE YOU TO NOD OFF OR FALL ASLEEP WHILE SITTING INACTIVE IN A PUBLIC PLACE: MODERATE CHANCE OF DOZING

## 2024-05-20 NOTE — PROGRESS NOTES
Outpatient Pulmonary Progress Note  CC: Shortness of breath & wheezing    Interval History 5/20/24:  f/u PSG  -  Had PSG demonstrating severe LIANG with AHI 51.  ESS is: 16.   Patient is very receptive to CPAP and eager to try anything that will help her sleep.  She struggles with recurrent nocturnal awakenings and tossing/turning all night.  -  SpO2 85% on RA today, recovers to 91% with rest.  States this is her baseline.     Interval History 4/18/24   Admitted in March 2024 with CHF and COPD.   excessive daytime sleepiness with witnessed apneas at [place] in March.  [place] is 9     Presenting HPI: Samina Hernandezn is a 65 yo with a past medical history liver cirrhosis and emphysema who presents today with a history of increasing severe shortness of breath worse with exertion, associated with chest tightness, treated with Trelegy with improvement.     reports that she has been smoking cigarettes. She has a 90.0 pack-year smoking history. She has never used smokeless tobacco.    PHYSICAL EXAM:  Blood pressure 98/60, pulse 95, resp. rate 19, height 1.676 m (5' 6\"), weight 96.2 kg (212 lb), SpO2 (!) 85 %, not currently breastfeeding.'   258# to 256# --> 213#; 212# May 2024;   Constitutional:  No acute distress.   HENT:  Oropharynx is clear and moist.  Class IV airway  Neck: No tracheal deviation present.  14.5 inches   Cardiovascular: Normal heart sounds.  + lower extremity edema.  Pulmonary/Chest: No wheezes. No rhonchi. No rales. + decreased breath sounds.  No accessory muscle usage or stridor.   Musculoskeletal: No cyanosis. No clubbing.  Skin: Skin is warm and dry.   Psychiatric: Normal mood and affect.  Neurologic: speech fluent, alert and oriented, strength symmetric      DATA:  PFT 12/23/21 FEV1 1.68 L 67% TLC 4.59 L 86%  DLCO 12.69 54%     LDCT for LCS 11/14/23  IMPRESSION:  Emphysema with stable tiny pulmonary nodule right lower lobe   Mild coronary artery calcification    CXR 3/8/24 Clear lung fields    PSG

## 2024-05-20 NOTE — PATIENT IN[place]CTIONS
[place] [address]   [facility]  [phone] [address] [place] [address]  **Past [place]     [facility]  ([employer])  Oxygen/PAP/NIV   [phone]     [phone]   [address] [place] [address]  **[address]   [facility]  Oxygen/[phone] [address] [place] [address]   [facility]  Oxygen/[phone] [address]  [address] [address]   [facility]  Oxygen/[phone] [address] [place] [address]   [facility]  Oxygen/[phone] [address] [place] [address]   [facility]  Oxygen/[phone]  [phone] [address] [place] [address]   [facility]  [phone] [address] [place] [address]  **[address] [facility]  ([facility])  Oxygen/PAP/NIV   [phone]  [phone]  [phone] [address] [place] [address]  **[address] [place] [facility]  [phone] [address] [place] [address]   [facility] [place]  [phone] [address] [place] [address]  **[place] of [place]  [phone] [address] [place] [address]   [facility] [place]   Oxygen/[phone] [address]  [place], [place] [address]   [facility] [place]  Oxygen/PAP/-[phone]  Option 4 [phone] [address] [place] [address]   [facility] [phone] [address] [place] [address]   [facility]  Oxygen/[phone] [address], [facility] [place] [address]   [facility]  Oxygen/[phone]  no change in level of consciousness/no confusion/no dizziness/no fever/no loss of consciousness

## 2024-05-20 NOTE — TELEPHONE ENCOUNTER
LVM for pt to contact office to see if they are available to come in today, either vv or F2F, today at 3PM

## 2024-06-04 ENCOUNTER — TELEPHONE (OUTPATIENT)
Dept: PHARMACY | Facility: CLINIC | Age: 69
End: 2024-06-04

## 2024-06-04 DIAGNOSIS — E11.9 TYPE 2 DIABETES MELLITUS WITHOUT COMPLICATION, WITHOUT LONG-TERM CURRENT USE OF INSULIN (HCC): ICD-10-CM

## 2024-06-04 RX ORDER — METFORMIN HYDROCHLORIDE 500 MG/1
500 TABLET, EXTENDED RELEASE ORAL 2 TIMES DAILY
Qty: 180 TABLET | Refills: 3 | Status: SHIPPED | OUTPATIENT
Start: 2024-06-04

## 2024-06-04 NOTE — TELEPHONE ENCOUNTER
[facility] CLINICAL PHARMACY: ADHERENCE REVIEW  Identified care gap per [place]: fills at [facility] : Diabetes and Statin adherence    Patient also appears to be prescribed: Diabetes and Statin    ASSESSMENT  DIABETES ADHERENCE    Insurance Records claims through 24 (Prior Year PDC = not reported; YTD PDC = FIRST FILL; Potential Fail Date: 24):   METFORMIN HCL  MG TABLET last filled on 24 for 90 day supply. Next refill due: 24    Prescribed si tablet/capsule twice daily    Per Reconcile Dispense History: last filled on 24 for 90 day supply.     Per [facility]:  0 refills remaining. Pharmacy will send a refill request to provider     Lab Results   Component Value Date    LABA1C 6.1 03/10/2024    LABA1C 6.5 2023    LABA1C 6.8 2023     NOTE: A1c <9%    STATIN ADHERENCE    Insurance Records claims through 24 (Prior Year PDC = not reported; YTD PDC = FIRST FILL; Potential Fail Date: 24):   ATORVASTATIN 20 MG TABLET last filled on 24 for 90 day supply. Next refill due: 24    Prescribed si tablet/capsule daily    Per Reconcile Dispense History: last filled on 24 for 90 day supply.     Per [facility]: will get 90 day supply ready to  since past due.    Lab Results   Component Value Date    CHOL 144 2024    TRIG 121 2024    HDL 42 2024     Lab Results   Component Value Date    LDL 78 2024      ALT   Date Value Ref Range Status   2024 10 10 - 40 U/L Final     AST   Date Value Ref Range Status   2024 17 15 - 37 U/L Final     The 10-year ASCVD risk score (Ayush YUSUF, et al., 2019) is: 18.1%    Values used to calculate the score:      Age: 69 years      Sex: Female      Is Non- : No      Diabetic: Yes      Tobacco smoker: Yes      Systolic Blood Pressure: 98 mmHg      Is BP treated: Yes      HDL Cholesterol: 42 mg/dL      Total Cholesterol: 144 mg/dL

## 2024-06-12 DIAGNOSIS — F41.0 PANIC DISORDER: ICD-10-CM

## 2024-06-12 RX ORDER — CLONAZEPAM 0.5 MG/1
TABLET ORAL
Qty: 60 TABLET | Refills: 0 | Status: SHIPPED | OUTPATIENT
Start: 2024-06-12 | End: 2024-07-12

## 2024-06-26 ENCOUNTER — HOSPITAL ENCOUNTER (OUTPATIENT)
Dept: SLEEP CENTER | Age: 69
Discharge: HOME | End: 2024-06-28
Payer: MEDICARE

## 2024-06-26 DIAGNOSIS — J44.9 CHRONIC OBSTRUCTIVE PULMONARY DISEASE, UNSPECIFIED COPD TYPE (HCC): ICD-10-CM

## 2024-06-26 DIAGNOSIS — G47.33 SEVERE OBSTRUCTIVE SLEEP APNEA: ICD-10-CM

## 2024-06-26 DIAGNOSIS — J96.11 CHRONIC HYPOXEMIC RESPIRATORY FAILURE (HCC): ICD-10-CM

## 2024-06-26 PROCEDURE — 95811 POLYSOM 6/>YRS CPAP 4/> PARM: CPT

## 2024-06-27 PROBLEM — J96.11 CHRONIC HYPOXEMIC RESPIRATORY FAILURE (HCC): Status: ACTIVE | Noted: 2024-06-27

## 2024-07-01 RX ORDER — FLUTICASONE FUROATE, UMECLIDINIUM BROMIDE AND VILANTEROL TRIFENATATE 200; 62.5; 25 UG/1; UG/1; UG/1
1 POWDER RESPIRATORY (INHALATION) DAILY
Qty: 1 EACH | Refills: 11 | Status: SHIPPED | OUTPATIENT
Start: 2024-07-01

## 2024-07-08 ENCOUNTER — TELEPHONE (OUTPATIENT)
Dept: PULMONOLOGY | Age: 69
End: 2024-07-08

## 2024-07-08 DIAGNOSIS — G47.33 OSA (OBSTRUCTIVE SLEEP APNEA): Primary | ICD-10-CM

## 2024-07-09 DIAGNOSIS — J96.11 CHRONIC HYPOXEMIC RESPIRATORY FAILURE (HCC): Primary | ICD-10-CM

## 2024-07-09 NOTE — TELEPHONE ENCOUNTER
Pt needs rx for financial assistance    Pt will get sample to hold her over until patient assistance is available

## 2024-07-10 ENCOUNTER — TELEPHONE (OUTPATIENT)
Dept: PULMONOLOGY | Age: 69
End: 2024-07-10

## 2024-07-10 DIAGNOSIS — G47.33 SEVERE OBSTRUCTIVE SLEEP APNEA: Primary | ICD-10-CM

## 2024-07-10 RX ORDER — FLUTICASONE FUROATE, UMECLIDINIUM BROMIDE AND VILANTEROL TRIFENATATE 200; 62.5; 25 UG/1; UG/1; UG/1
1 POWDER RESPIRATORY (INHALATION) DAILY
Qty: 1 EACH | Refills: 11 | Status: SHIPPED | OUTPATIENT
Start: 2024-07-10

## 2024-07-15 DIAGNOSIS — R20.8 BURNING SENSATION OF FEET: ICD-10-CM

## 2024-07-15 DIAGNOSIS — M51.36 DDD (DEGENERATIVE DISC DISEASE), LUMBAR: ICD-10-CM

## 2024-07-15 DIAGNOSIS — F32.89 OTHER DEPRESSION: ICD-10-CM

## 2024-07-15 DIAGNOSIS — G47.9 SLEEP DISTURBANCE: ICD-10-CM

## 2024-07-15 DIAGNOSIS — F41.0 PANIC DISORDER: ICD-10-CM

## 2024-07-15 DIAGNOSIS — E11.9 TYPE 2 DIABETES MELLITUS WITHOUT COMPLICATION, WITHOUT LONG-TERM CURRENT USE OF INSULIN (HCC): ICD-10-CM

## 2024-07-15 RX ORDER — BUPROPION HYDROCHLORIDE 150 MG/1
TABLET ORAL
Qty: 30 TABLET | Refills: 3 | Status: SHIPPED | OUTPATIENT
Start: 2024-07-15

## 2024-07-15 RX ORDER — GABAPENTIN 100 MG/1
CAPSULE ORAL
Qty: 90 CAPSULE | Refills: 0 | Status: SHIPPED | OUTPATIENT
Start: 2024-07-15 | End: 2024-08-14

## 2024-07-15 RX ORDER — CLONAZEPAM 0.5 MG/1
TABLET ORAL
Qty: 60 TABLET | Refills: 0 | Status: SHIPPED | OUTPATIENT
Start: 2024-07-15 | End: 2024-08-14

## 2024-07-15 RX ORDER — MIRTAZAPINE 15 MG/1
TABLET, FILM COATED ORAL
Qty: 90 TABLET | Refills: 3 | Status: SHIPPED | OUTPATIENT
Start: 2024-07-15

## 2024-07-15 NOTE — TELEPHONE ENCOUNTER
Last office visit 3/14/2024, advised to follow-up 4 months, next appointment 0.  Will mail appt letter

## 2024-07-17 ENCOUNTER — TELEPHONE (OUTPATIENT)
Dept: PULMONOLOGY | Age: 69
End: 2024-07-17

## 2024-07-17 DIAGNOSIS — G47.33 SEVERE OBSTRUCTIVE SLEEP APNEA: Primary | ICD-10-CM

## 2024-07-19 RX ORDER — FLUTICASONE FUROATE, UMECLIDINIUM BROMIDE AND VILANTEROL TRIFENATATE 200; 62.5; 25 UG/1; UG/1; UG/1
1 POWDER RESPIRATORY (INHALATION) DAILY
Qty: 2 EACH | Refills: 0 | COMMUNITY
Start: 2024-07-19

## 2024-07-31 ENCOUNTER — OFFICE VISIT (OUTPATIENT)
Dept: FAMILY MEDICINE CLINIC | Age: 69
End: 2024-07-31
Payer: MEDICARE

## 2024-07-31 VITALS
SYSTOLIC BLOOD PRESSURE: 110 MMHG | DIASTOLIC BLOOD PRESSURE: 72 MMHG | OXYGEN SATURATION: 90 % | HEART RATE: 88 BPM | WEIGHT: 208.8 LBS | BODY MASS INDEX: 33.56 KG/M2 | HEIGHT: 66 IN

## 2024-07-31 DIAGNOSIS — J44.9 CHRONIC OBSTRUCTIVE PULMONARY DISEASE, UNSPECIFIED COPD TYPE (HCC): Chronic | ICD-10-CM

## 2024-07-31 DIAGNOSIS — I10 PRIMARY HYPERTENSION: Primary | Chronic | ICD-10-CM

## 2024-07-31 DIAGNOSIS — R06.02 SHORTNESS OF BREATH: ICD-10-CM

## 2024-07-31 DIAGNOSIS — R53.83 OTHER FATIGUE: ICD-10-CM

## 2024-07-31 DIAGNOSIS — G47.33 SEVERE OBSTRUCTIVE SLEEP APNEA: Chronic | ICD-10-CM

## 2024-07-31 DIAGNOSIS — K74.60 CIRRHOSIS OF LIVER WITHOUT ASCITES, UNSPECIFIED HEPATIC CIRRHOSIS TYPE (HCC): Chronic | ICD-10-CM

## 2024-07-31 DIAGNOSIS — E11.9 TYPE 2 DIABETES MELLITUS WITHOUT COMPLICATION, WITHOUT LONG-TERM CURRENT USE OF INSULIN (HCC): ICD-10-CM

## 2024-07-31 DIAGNOSIS — F32.89 OTHER DEPRESSION: ICD-10-CM

## 2024-07-31 LAB
CREAT UR-MCNC: 66.4 MG/DL (ref 28–259)
MICROALBUMIN UR DL<=1MG/L-MCNC: <1.2 MG/DL
MICROALBUMIN/CREAT UR: NORMAL MG/G (ref 0–30)

## 2024-07-31 PROCEDURE — 3044F HG A1C LEVEL LT 7.0%: CPT | Performed by: FAMILY MEDICINE

## 2024-07-31 PROCEDURE — 1123F ACP DISCUSS/DSCN MKR DOCD: CPT | Performed by: FAMILY MEDICINE

## 2024-07-31 PROCEDURE — 3074F SYST BP LT 130 MM HG: CPT | Performed by: FAMILY MEDICINE

## 2024-07-31 PROCEDURE — 99214 OFFICE O/P EST MOD 30 MIN: CPT | Performed by: FAMILY MEDICINE

## 2024-07-31 PROCEDURE — 3078F DIAST BP <80 MM HG: CPT | Performed by: FAMILY MEDICINE

## 2024-07-31 PROCEDURE — G2211 COMPLEX E/M VISIT ADD ON: HCPCS | Performed by: FAMILY MEDICINE

## 2024-07-31 RX ORDER — ALBUTEROL SULFATE 90 UG/1
2 AEROSOL, METERED RESPIRATORY (INHALATION) EVERY 6 HOURS PRN
Qty: 18 G | Refills: 3 | Status: SHIPPED | OUTPATIENT
Start: 2024-07-31

## 2024-07-31 ASSESSMENT — ENCOUNTER SYMPTOMS
SHORTNESS OF BREATH: 1
CONSTIPATION: 1
DIARRHEA: 0
COUGH: 1

## 2024-07-31 NOTE — PROGRESS NOTES
Chief Complaint   Patient presents with    Discuss Medications       HPI:  Samina HECK Derick is a 69 y.o. (: 1955) here today to discuss medications.  HPI  Has been checking sugars am, before and after meals.  Feels that has impacted her food choices.  Still taking ozempic.  Has not taken metformin this week.  Was getting low sugars at night.  Seems better since stopping metformin.  No sig inc in sugars noted.      Has been getting injections in back.  Some helped, some have not.      Breathing has been fair.   Near baseline.  Now has bipap machine.  Has not used yet.  Still needs oxygen at night.     Has had some issues w/ energy level, motivation.      Patient's medications, allergies, past medical, surgical, social and family histories were reviewed and updated as appropriate.    ROS:  Review of Systems   Constitutional:  Positive for fatigue. Negative for fever.   Respiratory:  Positive for cough and shortness of breath.    Cardiovascular:  Negative for chest pain.   Gastrointestinal:  Positive for constipation. Negative for diarrhea.           Hemoglobin A1C (%)   Date Value   03/10/2024 6.1       Past Medical History:   Diagnosis Date    Anxiety     Arthritis     Depression     Disc degeneration     Frequent UTI     Hyperlipidemia     Hypertension     Liver cirrhosis (HCC) 2019    Type 2 diabetes mellitus without complication, without long-term current use of insulin (HCC)        Family History   Problem Relation Age of Onset    Diabetes Father     Cancer Father         colon    Cancer Sister         anal cancer, kidney cancer    High Blood Pressure Sister     Cancer Brother         stomach (unsure)    Depression Sister     High Blood Pressure Sister        Social History     Socioeconomic History    Marital status:      Spouse name: Not on file    Number of children: 4    Years of education: Not on file    Highest education level: Not on file   Occupational History    Not on file   Tobacco

## 2024-08-01 LAB
ALBUMIN SERPL-MCNC: 3.8 G/DL (ref 3.4–5)
ALBUMIN/GLOB SERPL: 1.3 {RATIO} (ref 1.1–2.2)
ALP SERPL-CCNC: 125 U/L (ref 40–129)
ALT SERPL-CCNC: 11 U/L (ref 10–40)
ANION GAP SERPL CALCULATED.3IONS-SCNC: 8 MMOL/L (ref 3–16)
AST SERPL-CCNC: 16 U/L (ref 15–37)
BILIRUB SERPL-MCNC: 0.4 MG/DL (ref 0–1)
BUN SERPL-MCNC: 11 MG/DL (ref 7–20)
CALCIUM SERPL-MCNC: 9.8 MG/DL (ref 8.3–10.6)
CHLORIDE SERPL-SCNC: 95 MMOL/L (ref 99–110)
CHOLEST SERPL-MCNC: 161 MG/DL (ref 0–199)
CO2 SERPL-SCNC: 37 MMOL/L (ref 21–32)
CREAT SERPL-MCNC: 0.7 MG/DL (ref 0.6–1.2)
EST. AVERAGE GLUCOSE BLD GHB EST-MCNC: 111.2 MG/DL
GFR SERPLBLD CREATININE-BSD FMLA CKD-EPI: >90 ML/MIN/{1.73_M2}
GLUCOSE SERPL-MCNC: 93 MG/DL (ref 70–99)
HBA1C MFR BLD: 5.5 %
HDLC SERPL-MCNC: 43 MG/DL (ref 40–60)
LDLC SERPL CALC-MCNC: 75 MG/DL
POTASSIUM SERPL-SCNC: 4 MMOL/L (ref 3.5–5.1)
PROT SERPL-MCNC: 6.7 G/DL (ref 6.4–8.2)
SODIUM SERPL-SCNC: 140 MMOL/L (ref 136–145)
T4 FREE SERPL-MCNC: 1.1 NG/DL (ref 0.9–1.8)
TRIGL SERPL-MCNC: 213 MG/DL (ref 0–150)
TSH SERPL DL<=0.005 MIU/L-ACNC: 2.11 UIU/ML (ref 0.27–4.2)
VIT B12 SERPL-MCNC: >2000 PG/ML (ref 211–911)
VLDLC SERPL CALC-MCNC: 43 MG/DL

## 2024-08-01 NOTE — RESULT ENCOUNTER NOTE
Plenty of B12.  Triglycerides a little higher than last check.  Total cholesterol slightly higher as well.  Otherwise lipids not bad.  CMP is okay.  Hemoglobin A1c significantly improved.  Okay to stay off of metformin.  Thyroid labs and urine for microalbumin normal.  CBC appears to be pending

## 2024-08-02 LAB
BASOPHILS # BLD: 0 K/UL (ref 0–0.2)
BASOPHILS NFR BLD: 0.3 %
DEPRECATED RDW RBC AUTO: 14.8 % (ref 12.4–15.4)
EOSINOPHIL # BLD: 0.3 K/UL (ref 0–0.6)
EOSINOPHIL NFR BLD: 3.2 %
HCT VFR BLD AUTO: 42.4 % (ref 36–48)
HGB BLD-MCNC: 14 G/DL (ref 12–16)
LYMPHOCYTES # BLD: 1.9 K/UL (ref 1–5.1)
LYMPHOCYTES NFR BLD: 19.8 %
MCH RBC QN AUTO: 30.3 PG (ref 26–34)
MCHC RBC AUTO-ENTMCNC: 32.9 G/DL (ref 31–36)
MCV RBC AUTO: 92 FL (ref 80–100)
MONOCYTES # BLD: 0.5 K/UL (ref 0–1.3)
MONOCYTES NFR BLD: 4.7 %
NEUTROPHILS # BLD: 6.9 K/UL (ref 1.7–7.7)
NEUTROPHILS NFR BLD: 72 %
PLATELET # BLD AUTO: 239 K/UL (ref 135–450)
PMV BLD AUTO: 8.8 FL (ref 5–10.5)
RBC # BLD AUTO: 4.61 M/UL (ref 4–5.2)
WBC # BLD AUTO: 9.6 K/UL (ref 4–11)

## 2024-08-15 DIAGNOSIS — F41.0 PANIC DISORDER: ICD-10-CM

## 2024-08-15 RX ORDER — CLONAZEPAM 0.5 MG/1
TABLET ORAL
Qty: 60 TABLET | Refills: 0 | Status: SHIPPED | OUTPATIENT
Start: 2024-08-15 | End: 2024-09-14

## 2024-08-15 NOTE — TELEPHONE ENCOUNTER
Date of last refill of this med was 7/15/2024, # of pills given 60 and # of refills given 0.  Their next appointment is 0, the last date patient was seen was 7/31/2024.  Does patient have medication agreement on file? No  Has drug screen been done in last 12 months if needed? no

## 2024-08-28 DIAGNOSIS — G47.33 SEVERE OBSTRUCTIVE SLEEP APNEA: ICD-10-CM

## 2024-08-28 RX ORDER — FLUTICASONE FUROATE, UMECLIDINIUM BROMIDE AND VILANTEROL TRIFENATATE 200; 62.5; 25 UG/1; UG/1; UG/1
1 POWDER RESPIRATORY (INHALATION) DAILY
Qty: 2 EACH | Refills: 0 | Status: SHIPPED | COMMUNITY
Start: 2024-08-28

## 2024-08-29 ENCOUNTER — TELEMEDICINE (OUTPATIENT)
Dept: FAMILY MEDICINE CLINIC | Age: 69
End: 2024-08-29

## 2024-08-29 DIAGNOSIS — Z00.00 MEDICARE ANNUAL WELLNESS VISIT, SUBSEQUENT: Primary | ICD-10-CM

## 2024-08-29 SDOH — ECONOMIC STABILITY: FOOD INSECURITY: WITHIN THE PAST 12 MONTHS, YOU WORRIED THAT YOUR FOOD WOULD RUN OUT BEFORE YOU GOT MONEY TO BUY MORE.: NEVER TRUE

## 2024-08-29 SDOH — ECONOMIC STABILITY: FOOD INSECURITY: WITHIN THE PAST 12 MONTHS, THE FOOD YOU BOUGHT JUST DIDN'T LAST AND YOU DIDN'T HAVE MONEY TO GET MORE.: NEVER TRUE

## 2024-08-29 SDOH — ECONOMIC STABILITY: INCOME INSECURITY: HOW HARD IS IT FOR YOU TO PAY FOR THE VERY BASICS LIKE FOOD, HOUSING, MEDICAL CARE, AND HEATING?: NOT HARD AT ALL

## 2024-08-29 ASSESSMENT — PATIENT HEALTH QUESTIONNAIRE - PHQ9
9. THOUGHTS THAT YOU WOULD BE BETTER OFF DEAD, OR OF HURTING YOURSELF: NOT AT ALL
SUM OF ALL RESPONSES TO PHQ QUESTIONS 1-9: 0
6. FEELING BAD ABOUT YOURSELF - OR THAT YOU ARE A FAILURE OR HAVE LET YOURSELF OR YOUR FAMILY DOWN: NOT AT ALL
5. POOR APPETITE OR OVEREATING: NOT AT ALL
3. TROUBLE FALLING OR STAYING ASLEEP: NOT AT ALL
4. FEELING TIRED OR HAVING LITTLE ENERGY: NOT AT ALL
7. TROUBLE CONCENTRATING ON THINGS, SUCH AS READING THE NEWSPAPER OR WATCHING TELEVISION: NOT AT ALL
1. LITTLE INTEREST OR PLEASURE IN DOING THINGS: NOT AT ALL
SUM OF ALL RESPONSES TO PHQ QUESTIONS 1-9: 0
SUM OF ALL RESPONSES TO PHQ9 QUESTIONS 1 & 2: 0
SUM OF ALL RESPONSES TO PHQ QUESTIONS 1-9: 0
8. MOVING OR SPEAKING SO SLOWLY THAT OTHER PEOPLE COULD HAVE NOTICED. OR THE OPPOSITE, BEING SO FIGETY OR RESTLESS THAT YOU HAVE BEEN MOVING AROUND A LOT MORE THAN USUAL: NOT AT ALL
10. IF YOU CHECKED OFF ANY PROBLEMS, HOW DIFFICULT HAVE THESE PROBLEMS MADE IT FOR YOU TO DO YOUR WORK, TAKE CARE OF THINGS AT HOME, OR GET ALONG WITH OTHER PEOPLE: NOT DIFFICULT AT ALL
SUM OF ALL RESPONSES TO PHQ QUESTIONS 1-9: 0
2. FEELING DOWN, DEPRESSED OR HOPELESS: NOT AT ALL

## 2024-08-29 ASSESSMENT — LIFESTYLE VARIABLES
HOW MANY STANDARD DRINKS CONTAINING ALCOHOL DO YOU HAVE ON A TYPICAL DAY: PATIENT DOES NOT DRINK
HOW OFTEN DO YOU HAVE A DRINK CONTAINING ALCOHOL: NEVER

## 2024-08-29 NOTE — PATIENT INSTRUCTIONS
Starting a Weight Loss Plan: Care Instructions  Overview     It can be a challenge to lose weight. But your doctor can help you make a weight-loss plan that meets your needs.  You don't have to make a lot of big changes at once. A better idea might be to focus on small changes and stick with them. When those changes become habit, you can add a few more changes.  Some people find it helpful to take an exercise or nutrition class. If you have questions, ask your doctor about seeing a registered dietitian or an exercise specialist. You might also think about joining a weight-loss support group.  If you're not ready to make changes right now, try to pick a date in the future. Then make an appointment with your doctor to talk about when and how you'll get started with a plan.  Follow-up care is a key part of your treatment and safety. Be sure to make and go to all appointments, and call your doctor if you are having problems. It's also a good idea to know your test results and keep a list of the medicines you take.  How can you care for yourself at home?  Set realistic goals. Many people expect to lose much more weight than is likely. A weight loss of 5% to 10% of your body weight may be enough to improve your health.  Get family and friends involved to provide support. Talk to them about why you are trying to lose weight, and ask them to help. They can help by participating in exercise and having meals with you, even if they may be eating something different.  Find what works best for you. If you do not have time or do not like to cook, a program that offers meal replacement bars or shakes may be better for you. Or if you like to prepare meals, finding a plan that includes daily menus and recipes may be best.  Ask your doctor about other health professionals who can help you achieve your weight loss goals.  A dietitian can help you make healthy changes in your diet.  An exercise specialist or  can  Preventive Plan rica HECK Derick - 8/29/2024  Medicare offers a range of preventive health benefits. Some of the tests and screenings are paid in full while other may be subject to a deductible, co-insurance, and/or copay.    Some of these benefits include a comprehensive review of your medical history including lifestyle, illnesses that may run in your family, and various assessments and screenings as appropriate.    After reviewing your medical record and screening and assessments performed today your provider may have ordered immunizations, labs, imaging, and/or referrals for you.  A list of these orders (if applicable) as well as your Preventive Care list are included within your After Visit Summary for your review.    Other Preventive Recommendations:    A preventive eye exam performed by an eye specialist is recommended every 1-2 years to screen for glaucoma; cataracts, macular degeneration, and other eye disorders.  A preventive dental visit is recommended every 6 months.  Try to get at least 150 minutes of exercise per week or 10,000 steps per day on a pedometer .  Order or download the FREE \"Exercise & Physical Activity: Your Everyday Guide\" from [employer]. Call 1-395.930.3407 or search [employer] online.  You need 6025-6994 mg of calcium and 0138-8300 IU of vitamin D per day. It is possible to meet your calcium requirement with diet alone, but a vitamin D supplement is usually necessary to meet this goal.  When exposed to the sun, use a sunscreen that protects against both UVA and UVB radiation with an SPF of 30 or greater. Reapply every 2 to 3 hours or after sweating, drying off with a towel, or swimming.  Always wear a seat belt when traveling in a car. Always wear a helmet when riding a bicycle or motorcycle.

## 2024-09-04 DIAGNOSIS — R60.9 DEPENDENT EDEMA: ICD-10-CM

## 2024-09-04 RX ORDER — FUROSEMIDE 40 MG
40 TABLET ORAL DAILY
Qty: 90 TABLET | Refills: 1 | Status: SHIPPED | OUTPATIENT
Start: 2024-09-04

## 2024-09-11 ENCOUNTER — TELEPHONE (OUTPATIENT)
Dept: PHARMACY | Facility: CLINIC | Age: 69
End: 2024-09-11

## 2024-09-12 DIAGNOSIS — E11.9 TYPE 2 DIABETES MELLITUS WITHOUT COMPLICATION, WITHOUT LONG-TERM CURRENT USE OF INSULIN (HCC): ICD-10-CM

## 2024-09-12 DIAGNOSIS — M51.36 DDD (DEGENERATIVE DISC DISEASE), LUMBAR: ICD-10-CM

## 2024-09-12 DIAGNOSIS — R20.8 BURNING SENSATION OF FEET: ICD-10-CM

## 2024-09-12 RX ORDER — GABAPENTIN 100 MG/1
CAPSULE ORAL
Qty: 90 CAPSULE | Refills: 2 | Status: SHIPPED | OUTPATIENT
Start: 2024-09-12 | End: 2024-10-12

## 2024-09-23 ENCOUNTER — OFFICE VISIT (OUTPATIENT)
Age: 69
End: 2024-09-23

## 2024-09-23 VITALS
HEART RATE: 84 BPM | WEIGHT: 206 LBS | BODY MASS INDEX: 33.11 KG/M2 | SYSTOLIC BLOOD PRESSURE: 108 MMHG | OXYGEN SATURATION: 95 % | HEIGHT: 66 IN | RESPIRATION RATE: 20 BRPM | DIASTOLIC BLOOD PRESSURE: 62 MMHG

## 2024-09-23 DIAGNOSIS — J96.11 CHRONIC HYPOXEMIC RESPIRATORY FAILURE: ICD-10-CM

## 2024-09-23 DIAGNOSIS — G47.33 SEVERE OBSTRUCTIVE SLEEP APNEA: Primary | Chronic | ICD-10-CM

## 2024-09-23 DIAGNOSIS — J44.9 CHRONIC OBSTRUCTIVE PULMONARY DISEASE, UNSPECIFIED COPD TYPE (HCC): Chronic | ICD-10-CM

## 2024-09-23 PROBLEM — J81.0 ACUTE PULMONARY EDEMA: Status: RESOLVED | Noted: 2024-03-08 | Resolved: 2024-09-23

## 2024-09-23 PROBLEM — R07.9 CHEST PAIN: Status: RESOLVED | Noted: 2017-03-27 | Resolved: 2024-09-23

## 2024-09-23 ASSESSMENT — SLEEP AND FATIGUE QUESTIONNAIRES
HOW LIKELY ARE YOU TO NOD OFF OR FALL ASLEEP WHILE LYING DOWN TO REST IN THE AFTERNOON WHEN CIRCUMSTANCES PERMIT: WOULD NEVER DOZE
HOW LIKELY ARE YOU TO NOD OFF OR FALL ASLEEP WHILE WATCHING TV: WOULD NEVER DOZE
HOW LIKELY ARE YOU TO NOD OFF OR FALL ASLEEP WHILE SITTING QUIETLY AFTER LUNCH WITHOUT ALCOHOL: WOULD NEVER DOZE
HOW LIKELY ARE YOU TO NOD OFF OR FALL ASLEEP WHILE SITTING INACTIVE IN A PUBLIC PLACE: MODERATE CHANCE OF DOZING
HOW LIKELY ARE YOU TO NOD OFF OR FALL ASLEEP WHILE SITTING AND READING: MODERATE CHANCE OF DOZING
HOW LIKELY ARE YOU TO NOD OFF OR FALL ASLEEP WHEN YOU ARE A PASSENGER IN A CAR FOR AN HOUR WITHOUT A BREAK: HIGH CHANCE OF DOZING
HOW LIKELY ARE YOU TO NOD OFF OR FALL ASLEEP IN A CAR, WHILE STOPPED FOR A FEW MINUTES IN TRAFFIC: WOULD NEVER DOZE
ESS TOTAL SCORE: 7
HOW LIKELY ARE YOU TO NOD OFF OR FALL ASLEEP WHILE SITTING AND TALKING TO SOMEONE: WOULD NEVER DOZE

## 2024-09-24 ENCOUNTER — TELEPHONE (OUTPATIENT)
Dept: PULMONOLOGY | Age: 69
End: 2024-09-24

## 2024-09-25 DIAGNOSIS — F41.0 PANIC DISORDER: ICD-10-CM

## 2024-09-25 RX ORDER — CLONAZEPAM 0.5 MG/1
TABLET ORAL
Qty: 60 TABLET | Refills: 0 | Status: SHIPPED | OUTPATIENT
Start: 2024-09-25 | End: 2024-10-25

## 2024-10-22 DIAGNOSIS — F41.0 PANIC DISORDER: ICD-10-CM

## 2024-10-22 RX ORDER — CLONAZEPAM 0.5 MG/1
TABLET ORAL
Qty: 60 TABLET | Refills: 0 | Status: SHIPPED | OUTPATIENT
Start: 2024-10-22 | End: 2024-11-21

## 2024-10-22 NOTE — TELEPHONE ENCOUNTER
Date of last refill of this med was 09/25/2024, # of pills given 60 and # of refills given 0.  Their next appointment is 0, the last date patient was seen was 08/29/2024.  Does patient have medication agreement on file? No  Has drug screen been done in last 12 months if needed? no

## 2024-10-30 DIAGNOSIS — G47.33 SEVERE OBSTRUCTIVE SLEEP APNEA: ICD-10-CM

## 2024-10-30 DIAGNOSIS — E11.9 TYPE 2 DIABETES MELLITUS WITHOUT COMPLICATION, WITHOUT LONG-TERM CURRENT USE OF INSULIN (HCC): ICD-10-CM

## 2024-10-30 RX ORDER — SEMAGLUTIDE 0.68 MG/ML
0.25 INJECTION, SOLUTION SUBCUTANEOUS WEEKLY
Qty: 3 ML | Refills: 5 | Status: SHIPPED | OUTPATIENT
Start: 2024-10-30 | End: 2024-11-01

## 2024-10-30 RX ORDER — FLUTICASONE FUROATE, UMECLIDINIUM BROMIDE AND VILANTEROL TRIFENATATE 200; 62.5; 25 UG/1; UG/1; UG/1
1 POWDER RESPIRATORY (INHALATION) DAILY
Qty: 2 EACH | Refills: 5 | Status: SHIPPED | OUTPATIENT
Start: 2024-10-30 | End: 2024-11-01

## 2024-10-30 NOTE — TELEPHONE ENCOUNTER
Ozempic   Lot # [id number]  EXP, 07/31/25  NDC 2550-4641-07    Leslye   Lot # [id number]  EXP. 12/01/25  NDC 9092-9163-50

## 2024-11-01 DIAGNOSIS — E11.9 TYPE 2 DIABETES MELLITUS WITHOUT COMPLICATION, WITHOUT LONG-TERM CURRENT USE OF INSULIN (HCC): ICD-10-CM

## 2024-11-01 DIAGNOSIS — G47.33 SEVERE OBSTRUCTIVE SLEEP APNEA: ICD-10-CM

## 2024-11-01 RX ORDER — SEMAGLUTIDE 0.68 MG/ML
0.25 INJECTION, SOLUTION SUBCUTANEOUS WEEKLY
Qty: 3 ML | Refills: 5 | Status: SHIPPED | COMMUNITY
Start: 2024-11-01

## 2024-11-01 RX ORDER — FLUTICASONE FUROATE, UMECLIDINIUM BROMIDE AND VILANTEROL TRIFENATATE 200; 62.5; 25 UG/1; UG/1; UG/1
1 POWDER RESPIRATORY (INHALATION) DAILY
Qty: 2 EACH | Refills: 5 | Status: SHIPPED | COMMUNITY
Start: 2024-11-01

## 2024-11-13 DIAGNOSIS — E11.9 TYPE 2 DIABETES MELLITUS WITHOUT COMPLICATION, WITHOUT LONG-TERM CURRENT USE OF INSULIN (HCC): ICD-10-CM

## 2024-11-13 DIAGNOSIS — M51.369 DDD (DEGENERATIVE DISC DISEASE), LUMBAR: ICD-10-CM

## 2024-11-13 DIAGNOSIS — R20.8 BURNING SENSATION OF FEET: ICD-10-CM

## 2024-11-13 DIAGNOSIS — F32.89 OTHER DEPRESSION: ICD-10-CM

## 2024-11-13 RX ORDER — BUPROPION HYDROCHLORIDE 150 MG/1
TABLET ORAL
Qty: 30 TABLET | Refills: 3 | Status: SHIPPED | OUTPATIENT
Start: 2024-11-13

## 2024-11-13 RX ORDER — GABAPENTIN 100 MG/1
CAPSULE ORAL
Qty: 90 CAPSULE | Refills: 2 | Status: SHIPPED | OUTPATIENT
Start: 2024-11-13 | End: 2024-12-13

## 2024-11-14 DIAGNOSIS — R06.02 SHORTNESS OF BREATH: ICD-10-CM

## 2024-11-14 RX ORDER — ALBUTEROL SULFATE 90 UG/1
INHALANT RESPIRATORY (INHALATION)
Qty: 1 EACH | Refills: 3 | Status: SHIPPED | OUTPATIENT
Start: 2024-11-14

## 2024-11-22 ENCOUNTER — TELEPHONE (OUTPATIENT)
Dept: PULMONOLOGY | Age: 69
End: 2024-11-22

## 2024-11-22 DIAGNOSIS — Z87.891 PERSONAL HISTORY OF TOBACCO USE: Primary | ICD-10-CM

## 2024-11-25 DIAGNOSIS — F41.0 PANIC DISORDER: ICD-10-CM

## 2024-11-25 RX ORDER — CLONAZEPAM 0.5 MG/1
TABLET ORAL
Qty: 60 TABLET | Refills: 0 | Status: SHIPPED | OUTPATIENT
Start: 2024-11-25 | End: 2024-12-25

## 2024-11-25 NOTE — TELEPHONE ENCOUNTER
Date of last refill of this med was 10/23/2024, # of pills given 60 and # of refills given 0.  Their next appointment is 0, the last date patient was seen was 08/29/2024.  Does patient have medication agreement on file? No  Has drug screen been done in last 12 months if needed? no

## 2024-12-13 DIAGNOSIS — F32.A DEPRESSION, UNSPECIFIED DEPRESSION TYPE: ICD-10-CM

## 2024-12-13 RX ORDER — CITALOPRAM HYDROBROMIDE 40 MG/1
TABLET ORAL
Qty: 90 TABLET | Refills: 3 | Status: SHIPPED | OUTPATIENT
Start: 2024-12-13

## 2024-12-16 ENCOUNTER — TELEPHONE (OUTPATIENT)
Dept: PHARMACY | Facility: CLINIC | Age: 69
End: 2024-12-16

## 2024-12-16 NOTE — TELEPHONE ENCOUNTER
[facility] CLINICAL PHARMACY: ADHERENCE REVIEW  Identified care gap per [place]: fills at [facility] : Diabetes adherence    Patient also appears to be prescribed: Statin- per [place] adherent 2024    ASSESSMENT  DIABETES ADHERENCE    Insurance Records claims through 12/9/24 (Prior Year PDC = not reported; YTD PDC = 79%; Potential Fail Date: 12/19/24):   Metformin  mg tablets last filled on 9/17/24 for 90 day supply. Next refill due: 12/16/24  Refills remaining    Per 7/31/24 PCP note, \"Okay to stay off of metformin.\"  [facility] in their system.    Per 11/1/24 refill encounter, Patient given Ozempic samples on 10/30/24.    Lab Results   Component Value Date    LABA1C 5.5 07/31/2024    LABA1C 6.1 03/10/2024    LABA1C 6.5 11/08/2023     NOTE: A1c <9%    PLAN  No patient outreach planned at this time.     Last Visit: 7/31/24  Future Appointments   Date Time Provider Department Center   3/3/2025  4:00 PM MHC CT MAIN [facility]   3/6/2025  1:30 PM Daljit Charles MD CLERM PULM [facility]   9/22/2025  2:30 PM Polly Cee PA-C CLER SLEEP [facility]       Yolanda Clark, PharmD, [facility], [facility]  [facility] Clinical Pharmacy  Department, toll free: [phone], option 1    For Pharmacy Admin Tracking Only    Program: [facility]  CPA in place:  No  Gap Closed?: No   Time Spent (min): 15

## 2024-12-24 DIAGNOSIS — F41.0 PANIC DISORDER: ICD-10-CM

## 2024-12-24 RX ORDER — CLONAZEPAM 0.5 MG/1
TABLET ORAL
Qty: 60 TABLET | Refills: 0 | Status: SHIPPED | OUTPATIENT
Start: 2024-12-24 | End: 2025-01-23

## 2024-12-24 NOTE — TELEPHONE ENCOUNTER
Date of last refill of this med was 11/25/2024, # of pills given 60 and # of refills given 0.  Their next appointment is 0, the last date patient was seen was 8/29/2024.  Does patient have medication agreement on file? No  Has drug screen been done in last 12 months if needed? no

## 2025-01-02 DIAGNOSIS — E78.5 HYPERLIPIDEMIA WITH TARGET LDL LESS THAN 130: ICD-10-CM

## 2025-01-02 DIAGNOSIS — I10 ESSENTIAL HYPERTENSION: ICD-10-CM

## 2025-01-02 RX ORDER — ATORVASTATIN CALCIUM 20 MG/1
TABLET, FILM COATED ORAL
Qty: 90 TABLET | Refills: 3 | Status: SHIPPED | OUTPATIENT
Start: 2025-01-02

## 2025-01-13 DIAGNOSIS — G47.33 SEVERE OBSTRUCTIVE SLEEP APNEA: ICD-10-CM

## 2025-01-13 NOTE — TELEPHONE ENCOUNTER
Pt needs pcp to send 30 day supply of trelegy to [facility] pharmacy so pharmacy can run through insurance to quote this patient a price

## 2025-01-14 RX ORDER — FLUTICASONE FUROATE, UMECLIDINIUM BROMIDE AND VILANTEROL TRIFENATATE 200; 62.5; 25 UG/1; UG/1; UG/1
1 POWDER RESPIRATORY (INHALATION) DAILY
Qty: 1 EACH | Refills: 5 | Status: SHIPPED | OUTPATIENT
Start: 2025-01-14

## 2025-01-15 ENCOUNTER — TELEPHONE (OUTPATIENT)
Dept: PULMONOLOGY | Age: 70
End: 2025-01-15

## 2025-01-15 NOTE — TELEPHONE ENCOUNTER
Patient called in today and ask if she could get a sample of Tregely Ellipta 200-62, 5-25 MCG/ACT AEPB inhaler.She said that she is out and can not afford to pay for the medication at this time.

## 2025-01-17 NOTE — TELEPHONE ENCOUNTER
Called patient no answer, left message that Dr. Harvey said it would be fine to stop by office and  a sample of trelegy and if she has any question to call office.

## 2025-01-21 NOTE — TELEPHONE ENCOUNTER
Called patient back to let her know that a sample will be here to . There was no answer and I did leave a voice mail.

## 2025-01-22 DIAGNOSIS — G47.33 SEVERE OBSTRUCTIVE SLEEP APNEA: Primary | ICD-10-CM

## 2025-01-22 RX ORDER — FLUTICASONE FUROATE, UMECLIDINIUM BROMIDE AND VILANTEROL TRIFENATATE 200; 62.5; 25 UG/1; UG/1; UG/1
1 POWDER RESPIRATORY (INHALATION) DAILY
Qty: 1 EACH | Refills: 1 | Status: SHIPPED | COMMUNITY
Start: 2025-01-22

## 2025-01-24 DIAGNOSIS — F41.0 PANIC DISORDER: ICD-10-CM

## 2025-01-24 RX ORDER — CLONAZEPAM 0.5 MG/1
TABLET ORAL
Qty: 60 TABLET | Refills: 0 | Status: SHIPPED | OUTPATIENT
Start: 2025-01-24 | End: 2025-02-23

## 2025-01-24 NOTE — TELEPHONE ENCOUNTER
I did refill med, but would recommend try to taper off either pain med or clonazepam.  Typically recommend not taking narcotic pain med w/ clonazepam or similar med due to risk of SE.  Can discuss further at next appt

## 2025-01-24 NOTE — TELEPHONE ENCOUNTER
Date of last refill of this med was 12/24/2024, # of pills given 60 and # of refills given 0.  Their next appointment is 0, the last date patient was seen was 8/29/2024.  Does patient have medication agreement on file? No  Has drug screen been done in last 12 months if needed? no

## 2025-01-29 ENCOUNTER — OFFICE VISIT (OUTPATIENT)
Dept: FAMILY MEDICINE CLINIC | Age: 70
End: 2025-01-29
Payer: MEDICARE

## 2025-01-29 VITALS
HEIGHT: 66 IN | SYSTOLIC BLOOD PRESSURE: 138 MMHG | OXYGEN SATURATION: 92 % | WEIGHT: 219.4 LBS | HEART RATE: 72 BPM | BODY MASS INDEX: 35.26 KG/M2 | DIASTOLIC BLOOD PRESSURE: 84 MMHG

## 2025-01-29 DIAGNOSIS — F41.9 ANXIETY: ICD-10-CM

## 2025-01-29 DIAGNOSIS — K74.60 CIRRHOSIS OF LIVER WITHOUT ASCITES, UNSPECIFIED HEPATIC CIRRHOSIS TYPE (HCC): ICD-10-CM

## 2025-01-29 DIAGNOSIS — J96.11 CHRONIC HYPOXEMIC RESPIRATORY FAILURE: ICD-10-CM

## 2025-01-29 DIAGNOSIS — F32.89 OTHER DEPRESSION: ICD-10-CM

## 2025-01-29 DIAGNOSIS — I25.10 CORONARY ARTERY DISEASE INVOLVING NATIVE CORONARY ARTERY OF NATIVE HEART WITHOUT ANGINA PECTORIS: Primary | ICD-10-CM

## 2025-01-29 DIAGNOSIS — E11.9 TYPE 2 DIABETES MELLITUS WITHOUT COMPLICATION, WITHOUT LONG-TERM CURRENT USE OF INSULIN (HCC): ICD-10-CM

## 2025-01-29 DIAGNOSIS — R07.89 ATYPICAL CHEST PAIN: ICD-10-CM

## 2025-01-29 DIAGNOSIS — J44.9 CHRONIC OBSTRUCTIVE PULMONARY DISEASE, UNSPECIFIED COPD TYPE (HCC): ICD-10-CM

## 2025-01-29 LAB
ALBUMIN SERPL-MCNC: 4 G/DL (ref 3.4–5)
ALBUMIN/GLOB SERPL: 1.6 {RATIO} (ref 1.1–2.2)
ALP SERPL-CCNC: 111 U/L (ref 40–129)
ALT SERPL-CCNC: 13 U/L (ref 10–40)
ANION GAP SERPL CALCULATED.3IONS-SCNC: 9 MMOL/L (ref 3–16)
AST SERPL-CCNC: 17 U/L (ref 15–37)
BILIRUB SERPL-MCNC: 0.4 MG/DL (ref 0–1)
BUN SERPL-MCNC: 16 MG/DL (ref 7–20)
CALCIUM SERPL-MCNC: 9.8 MG/DL (ref 8.3–10.6)
CHLORIDE SERPL-SCNC: 103 MMOL/L (ref 99–110)
CHOLEST SERPL-MCNC: 174 MG/DL (ref 0–199)
CO2 SERPL-SCNC: 30 MMOL/L (ref 21–32)
CREAT SERPL-MCNC: 0.8 MG/DL (ref 0.6–1.2)
GFR SERPLBLD CREATININE-BSD FMLA CKD-EPI: 80 ML/MIN/{1.73_M2}
GLUCOSE SERPL-MCNC: 98 MG/DL (ref 70–99)
HDLC SERPL-MCNC: 46 MG/DL (ref 40–60)
LDLC SERPL CALC-MCNC: 90 MG/DL
POTASSIUM SERPL-SCNC: 4.9 MMOL/L (ref 3.5–5.1)
PROT SERPL-MCNC: 6.5 G/DL (ref 6.4–8.2)
SODIUM SERPL-SCNC: 142 MMOL/L (ref 136–145)
TRIGL SERPL-MCNC: 191 MG/DL (ref 0–150)
VLDLC SERPL CALC-MCNC: 38 MG/DL

## 2025-01-29 PROCEDURE — 3075F SYST BP GE 130 - 139MM HG: CPT | Performed by: FAMILY MEDICINE

## 2025-01-29 PROCEDURE — 99214 OFFICE O/P EST MOD 30 MIN: CPT | Performed by: FAMILY MEDICINE

## 2025-01-29 PROCEDURE — 36415 COLL VENOUS BLD VENIPUNCTURE: CPT | Performed by: FAMILY MEDICINE

## 2025-01-29 PROCEDURE — 1123F ACP DISCUSS/DSCN MKR DOCD: CPT | Performed by: FAMILY MEDICINE

## 2025-01-29 PROCEDURE — 1159F MED LIST DOCD IN RCRD: CPT | Performed by: FAMILY MEDICINE

## 2025-01-29 PROCEDURE — 3079F DIAST BP 80-89 MM HG: CPT | Performed by: FAMILY MEDICINE

## 2025-01-29 PROCEDURE — G2211 COMPLEX E/M VISIT ADD ON: HCPCS | Performed by: FAMILY MEDICINE

## 2025-01-29 RX ORDER — MELOXICAM 7.5 MG/1
7.5 TABLET ORAL 2 TIMES DAILY
COMMUNITY
Start: 2025-01-22

## 2025-01-29 RX ORDER — SEMAGLUTIDE 0.68 MG/ML
0.5 INJECTION, SOLUTION SUBCUTANEOUS WEEKLY
Qty: 3 ML | Refills: 5 | Status: SHIPPED | OUTPATIENT
Start: 2025-01-29

## 2025-01-29 RX ORDER — BUSPIRONE HYDROCHLORIDE 5 MG/1
5 TABLET ORAL 2 TIMES DAILY
Qty: 60 TABLET | Refills: 0 | Status: SHIPPED | OUTPATIENT
Start: 2025-01-29 | End: 2025-02-28

## 2025-01-29 SDOH — ECONOMIC STABILITY: FOOD INSECURITY: WITHIN THE PAST 12 MONTHS, YOU WORRIED THAT YOUR FOOD WOULD RUN OUT BEFORE YOU GOT MONEY TO BUY MORE.: NEVER TRUE

## 2025-01-29 SDOH — ECONOMIC STABILITY: FOOD INSECURITY: WITHIN THE PAST 12 MONTHS, THE FOOD YOU BOUGHT JUST DIDN'T LAST AND YOU DIDN'T HAVE MONEY TO GET MORE.: NEVER TRUE

## 2025-01-29 ASSESSMENT — PATIENT HEALTH QUESTIONNAIRE - PHQ9
4. FEELING TIRED OR HAVING LITTLE ENERGY: NOT AT ALL
10. IF YOU CHECKED OFF ANY PROBLEMS, HOW DIFFICULT HAVE THESE PROBLEMS MADE IT FOR YOU TO DO YOUR WORK, TAKE CARE OF THINGS AT HOME, OR GET ALONG WITH OTHER PEOPLE: NOT DIFFICULT AT ALL
7. TROUBLE CONCENTRATING ON THINGS, SUCH AS READING THE NEWSPAPER OR WATCHING TELEVISION: NOT AT ALL
SUM OF ALL RESPONSES TO PHQ QUESTIONS 1-9: 0
5. POOR APPETITE OR OVEREATING: NOT AT ALL
SUM OF ALL RESPONSES TO PHQ QUESTIONS 1-9: 0
3. TROUBLE FALLING OR STAYING ASLEEP: NOT AT ALL
2. FEELING DOWN, DEPRESSED OR HOPELESS: NOT AT ALL
SUM OF ALL RESPONSES TO PHQ QUESTIONS 1-9: 0
8. MOVING OR SPEAKING SO SLOWLY THAT OTHER PEOPLE COULD HAVE NOTICED. OR THE OPPOSITE, BEING SO FIGETY OR RESTLESS THAT YOU HAVE BEEN MOVING AROUND A LOT MORE THAN USUAL: NOT AT ALL
SUM OF ALL RESPONSES TO PHQ9 QUESTIONS 1 & 2: 0
6. FEELING BAD ABOUT YOURSELF - OR THAT YOU ARE A FAILURE OR HAVE LET YOURSELF OR YOUR FAMILY DOWN: NOT AT ALL
9. THOUGHTS THAT YOU WOULD BE BETTER OFF DEAD, OR OF HURTING YOURSELF: NOT AT ALL
SUM OF ALL RESPONSES TO PHQ QUESTIONS 1-9: 0
1. LITTLE INTEREST OR PLEASURE IN DOING THINGS: NOT AT ALL

## 2025-01-29 ASSESSMENT — ENCOUNTER SYMPTOMS
NAUSEA: 0
COUGH: 1
CONSTIPATION: 0
DIARRHEA: 0
SHORTNESS OF BREATH: 1
VOMITING: 0
ABDOMINAL PAIN: 0

## 2025-01-29 NOTE — PROGRESS NOTES
Chief Complaint   Patient presents with    Hypertension    Hyperlipidemia    Diabetes       HPI:  Samina Sepulveda is a 70 y.o. (: 1955) here today   for   Hypertension  This is a chronic problem. The current episode started more than 1 year ago. Associated symptoms include chest pain and shortness of breath (chronic). Risk factors for coronary artery disease include diabetes mellitus, dyslipidemia and post-menopausal state. Past treatments include diuretics. There are no compliance problems.    Hyperlipidemia  This is a chronic problem. The current episode started more than 1 year ago. Associated symptoms include chest pain and shortness of breath (chronic). Current antihyperlipidemic treatment includes statins. There are no compliance problems.  Risk factors for coronary artery disease include dyslipidemia, diabetes mellitus, hypertension and post-menopausal.   Diabetes  She presents for her follow-up diabetic visit. She has type 2 diabetes mellitus. Associated symptoms include chest pain and fatigue. Risk factors for coronary artery disease include dyslipidemia, diabetes mellitus and hypertension. Current diabetic treatment includes oral agent (monotherapy). She monitors blood glucose at home 5+ x per day (has CGM). An ACE inhibitor/angiotensin II receptor blocker is not being taken.     Left sided chest pain over past mo.  Comes and goes.  Stinging.  Not related to exertion.  Ongoing issues w/ cough. Some improvement w/ mobic.  Med helps back and legs as well.      Ongoing issues w/ chronic fatigue.  Meds for mood seem to help.     Bp has been fairly well controlled at home.      Sugars  at home.  Occas low sugars (70's).      Hx of cirrhosis.  Not seeing GI currently.     Still taking klonopin am and pm.  Still prn pain meds per pain mgmt.      Has been snacking more.      Patient's medications, allergies, past medical, surgical, social and family histories were reviewed and updated as

## 2025-01-29 NOTE — PROGRESS NOTES
Chief Complaint   Patient presents with    Hypertension    Hyperlipidemia    Diabetes       HPI:  Samina Sepulveda is a 70 y.o. (: 1955) here today   for   Hypertension  This is a chronic problem. The current episode started more than 1 year ago. Associated symptoms include chest pain and shortness of breath (COPD, chronic). Risk factors for coronary artery disease include diabetes mellitus, dyslipidemia and post-menopausal state. Past treatments include diuretics.   Hyperlipidemia  This is a chronic problem. The current episode started more than 1 year ago. Exacerbating diseases include diabetes and obesity. Associated symptoms include chest pain and shortness of breath (COPD, chronic). Pertinent negatives include no myalgias. Current antihyperlipidemic treatment includes statins. Risk factors for coronary artery disease include diabetes mellitus, dyslipidemia, hypertension and post-menopausal.   Diabetes  She presents for her follow-up diabetic visit. She has type 2 diabetes mellitus. Pertinent negatives for hypoglycemia include no dizziness. Associated symptoms include chest pain and fatigue (Tired a lot, chronic). Diabetic complications include peripheral neuropathy. Risk factors for coronary artery disease include diabetes mellitus, dyslipidemia, hypertension and post-menopausal. Current diabetic treatment includes oral agent (monotherapy).     For the last month she has been experiencing chest pain. Depending on the day, can last all day or be intermittent. Feels stinging, left-sided, nothing in particular triggers it. Will occur at rest. Chronic cough as she is a smoker, does not recall inciting incident. Has improved with meloxicam. Has not experienced this before. This has also helped her back, legs.    Says she is tired, chronic. When she is depressed, sleeps all day, which is not frequent. On celexa, wellbutrin which helps with the mood.     HTN - Occ takes readings at home. Usually 120s/70s. No new

## 2025-01-30 LAB
BASOPHILS # BLD: 0.1 K/UL (ref 0–0.2)
BASOPHILS NFR BLD: 0.7 %
DEPRECATED RDW RBC AUTO: 13.5 % (ref 12.4–15.4)
EOSINOPHIL # BLD: 0.1 K/UL (ref 0–0.6)
EOSINOPHIL NFR BLD: 1.8 %
EST. AVERAGE GLUCOSE BLD GHB EST-MCNC: 119.8 MG/DL
HBA1C MFR BLD: 5.8 %
HCT VFR BLD AUTO: 43.6 % (ref 36–48)
HGB BLD-MCNC: 14.7 G/DL (ref 12–16)
LYMPHOCYTES # BLD: 1.8 K/UL (ref 1–5.1)
LYMPHOCYTES NFR BLD: 24.5 %
MCH RBC QN AUTO: 30.8 PG (ref 26–34)
MCHC RBC AUTO-ENTMCNC: 33.7 G/DL (ref 31–36)
MCV RBC AUTO: 91.3 FL (ref 80–100)
MONOCYTES # BLD: 0.5 K/UL (ref 0–1.3)
MONOCYTES NFR BLD: 7 %
NEUTROPHILS # BLD: 4.9 K/UL (ref 1.7–7.7)
NEUTROPHILS NFR BLD: 66 %
PLATELET # BLD AUTO: 239 K/UL (ref 135–450)
PMV BLD AUTO: 8.5 FL (ref 5–10.5)
RBC # BLD AUTO: 4.78 M/UL (ref 4–5.2)
WBC # BLD AUTO: 7.4 K/UL (ref 4–11)

## 2025-01-30 NOTE — RESULT ENCOUNTER NOTE
Total cholesterol slightly higher, but triglycerides and HDL improved from last check.  It appears CBC was not able to be done.  CMP is normal.  Hemoglobin A1c was not able to be done either.  Would recommend redraw CBC and hemoglobin A1c at her convenience.  If there is another facility that is closer, order could be sent there

## 2025-02-24 ENCOUNTER — TELEMEDICINE (OUTPATIENT)
Dept: FAMILY MEDICINE CLINIC | Age: 70
End: 2025-02-24
Payer: MEDICARE

## 2025-02-24 DIAGNOSIS — Z00.00 MEDICARE ANNUAL WELLNESS VISIT, SUBSEQUENT: Primary | ICD-10-CM

## 2025-02-24 PROCEDURE — G0439 PPPS, SUBSEQ VISIT: HCPCS | Performed by: FAMILY MEDICINE

## 2025-02-24 PROCEDURE — 1159F MED LIST DOCD IN RCRD: CPT | Performed by: FAMILY MEDICINE

## 2025-02-24 PROCEDURE — 1123F ACP DISCUSS/DSCN MKR DOCD: CPT | Performed by: FAMILY MEDICINE

## 2025-02-24 ASSESSMENT — PATIENT HEALTH QUESTIONNAIRE - PHQ9
9. THOUGHTS THAT YOU WOULD BE BETTER OFF DEAD, OR OF HURTING YOURSELF: NOT AT ALL
2. FEELING DOWN, DEPRESSED OR HOPELESS: NOT AT ALL
SUM OF ALL RESPONSES TO PHQ QUESTIONS 1-9: 1
7. TROUBLE CONCENTRATING ON THINGS, SUCH AS READING THE NEWSPAPER OR WATCHING TELEVISION: NOT AT ALL
10. IF YOU CHECKED OFF ANY PROBLEMS, HOW DIFFICULT HAVE THESE PROBLEMS MADE IT FOR YOU TO DO YOUR WORK, TAKE CARE OF THINGS AT HOME, OR GET ALONG WITH OTHER PEOPLE: NOT DIFFICULT AT ALL
SUM OF ALL RESPONSES TO PHQ QUESTIONS 1-9: 1
6. FEELING BAD ABOUT YOURSELF - OR THAT YOU ARE A FAILURE OR HAVE LET YOURSELF OR YOUR FAMILY DOWN: NOT AT ALL
3. TROUBLE FALLING OR STAYING ASLEEP: NOT AT ALL
8. MOVING OR SPEAKING SO SLOWLY THAT OTHER PEOPLE COULD HAVE NOTICED. OR THE OPPOSITE, BEING SO FIGETY OR RESTLESS THAT YOU HAVE BEEN MOVING AROUND A LOT MORE THAN USUAL: NOT AT ALL
4. FEELING TIRED OR HAVING LITTLE ENERGY: NOT AT ALL
SUM OF ALL RESPONSES TO PHQ QUESTIONS 1-9: 1
1. LITTLE INTEREST OR PLEASURE IN DOING THINGS: NOT AT ALL
SUM OF ALL RESPONSES TO PHQ QUESTIONS 1-9: 1
SUM OF ALL RESPONSES TO PHQ9 QUESTIONS 1 & 2: 0
5. POOR APPETITE OR OVEREATING: SEVERAL DAYS

## 2025-02-24 NOTE — PROGRESS NOTES
Medicare Annual Wellness Visit    Samina HECK Derick is here for Medicare AWV    Assessment & Plan   Medicare annual wellness visit, subsequent     Return in 1 year (on 2/24/2026) for Medicare AWV.     Subjective   Patient is a smoker and know the health risks. Her CT Lung screening was ordered by Dr Tripp and has an appointment scheduled for 3/2025    Patient's complete Health Risk Assessment and screening values have been reviewed and are found in Flowsheets. The following problems were reviewed today and where indicated follow up appointments were made and/or referrals ordered.    Positive Risk Factor Screenings with Interventions:          Controlled Medication Review:    Today's Pain Level: No data recorded   Opioid Risk: (Low risk score <55) Opioid risk score: 45    Patient is low risk for opioid use disorder or overdose.    Last PDMP Norberto as Reviewed:  Review User Review Instant Review Result   ROME GREENE 1/24/2025 10:59 AM     Reviewed PDMP [1]     Last Controlled Substance Monitoring Documentation      Flowsheet Row Refill from 1/24/2025 in [facility] [place]   Periodic Controlled Substance Monitoring No signs of potential drug abuse or diversion identified. filed at 01/24/2025 1100              General HRA Questions:  Select all that apply: (!) New or Increased Pain  Interventions - Pain:  Patient comments: stated that she takes pain medication for her back and also gets injections in her spine approximately every 3 months  See AVS for additional education material      Inactivity:  On average, how many days per week do you engage in moderate to strenuous exercise (like a brisk walk)?: 2 days (!) Abnormal  On average, how many minutes do you engage in exercise at this level?: 10 min  Interventions:  See AVS for additional education material     Abnormal BMI (obese):  There is no height or weight on file to calculate BMI. (!) Abnormal  Interventions:  See AVS for additional education

## 2025-02-24 NOTE — PATIENT INSTRUCTIONS
Starting a Weight-Loss Plan: Care Instructions  Overview    It can be a challenge to lose weight. But your doctor can help you make a weight-loss plan that meets your needs.  You don't have to make a lot of big changes at once. A better idea might be to focus on small changes and stick with them. When those changes become habit, you can add a few more changes.  Some people find it helpful to take an exercise or nutrition class. If you have questions, ask your doctor about seeing a registered dietitian or an exercise specialist. You might also think about joining a weight-loss support group.  If you're not ready to make changes right now, try to pick a date in the future. Then make an appointment with your doctor to talk about when and how you'll get started with a plan.  Follow-up care is a key part of your treatment and safety. Be sure to make and go to all appointments, and call your doctor if you are having problems. It's also a good idea to know your test results and keep a list of the medicines you take.  How can you care for yourself as you start a weight-loss plan?  Set realistic goals. Many people expect to lose much more weight than is likely. A weight loss of 5% to 10% of your body weight may be enough to improve your health.  Get family and friends involved to provide support. Talk to them about why you are trying to lose weight, and ask them to help. They can help by participating in exercise and having meals with you, even if they may be eating something different.  Find what works best for you. If you do not have time or do not like to cook, a program that offers meal replacement bars or shakes may be better for you. Or if you like to prepare meals, finding a plan that includes daily menus and recipes may be best.  Ask your doctor about other health professionals who can help you achieve your weight-loss goals.  A dietitian can help you make healthy changes in your diet.  An exercise specialist or 
Yes...

## 2025-03-03 ENCOUNTER — HOSPITAL ENCOUNTER (OUTPATIENT)
Dept: CT IMAGING | Age: 70
Discharge: HOME | End: 2025-03-03
Payer: MEDICARE

## 2025-03-03 DIAGNOSIS — F41.9 ANXIETY: ICD-10-CM

## 2025-03-03 DIAGNOSIS — Z87.891 PERSONAL HISTORY OF TOBACCO USE: ICD-10-CM

## 2025-03-03 PROCEDURE — 71271 CT THORAX LUNG CANCER SCR C-: CPT

## 2025-03-03 RX ORDER — BUSPIRONE HYDROCHLORIDE 5 MG/1
TABLET ORAL
Qty: 60 TABLET | Refills: 5 | Status: SHIPPED | OUTPATIENT
Start: 2025-03-03

## 2025-03-03 NOTE — TELEPHONE ENCOUNTER
Future appt scheduled 05/29/2025                        Last appt 01/29/2025      Last Written 01/29/2025    busPIRone (BUSPAR) 5 MG tablet   #60  0 RF

## 2025-03-06 ENCOUNTER — OFFICE VISIT (OUTPATIENT)
Dept: PULMONOLOGY | Age: 70
End: 2025-03-06

## 2025-03-06 VITALS
HEIGHT: 65 IN | SYSTOLIC BLOOD PRESSURE: 128 MMHG | RESPIRATION RATE: 17 BRPM | OXYGEN SATURATION: 97 % | BODY MASS INDEX: 36.46 KG/M2 | HEART RATE: 73 BPM | DIASTOLIC BLOOD PRESSURE: 70 MMHG | WEIGHT: 218.8 LBS

## 2025-03-06 DIAGNOSIS — Z87.891 PERSONAL HISTORY OF TOBACCO USE: Primary | ICD-10-CM

## 2025-03-06 ASSESSMENT — SLEEP AND FATIGUE QUESTIONNAIRES
HOW LIKELY ARE YOU TO NOD OFF OR FALL ASLEEP WHILE SITTING INACTIVE IN A PUBLIC PLACE: WOULD NEVER DOZE
HOW LIKELY ARE YOU TO NOD OFF OR FALL ASLEEP WHILE SITTING QUIETLY AFTER LUNCH WITHOUT ALCOHOL: WOULD NEVER DOZE
HOW LIKELY ARE YOU TO NOD OFF OR FALL ASLEEP WHILE SITTING AND TALKING TO SOMEONE: WOULD NEVER DOZE
HOW LIKELY ARE YOU TO NOD OFF OR FALL ASLEEP WHEN YOU ARE A PASSENGER IN A CAR FOR AN HOUR WITHOUT A BREAK: WOULD NEVER DOZE
HOW LIKELY ARE YOU TO NOD OFF OR FALL ASLEEP IN A CAR, WHILE STOPPED FOR A FEW MINUTES IN TRAFFIC: WOULD NEVER DOZE
HOW LIKELY ARE YOU TO NOD OFF OR FALL ASLEEP WHILE SITTING AND READING: SLIGHT CHANCE OF DOZING
ESS TOTAL SCORE: 4
HOW LIKELY ARE YOU TO NOD OFF OR FALL ASLEEP WHILE WATCHING TV: WOULD NEVER DOZE
HOW LIKELY ARE YOU TO NOD OFF OR FALL ASLEEP WHILE LYING DOWN TO REST IN THE AFTERNOON WHEN CIRCUMSTANCES PERMIT: HIGH CHANCE OF DOZING

## 2025-03-06 NOTE — PROGRESS NOTES
subjective sleepiness  Witnessed apnea while at A   Greater than 100-pack-year tobacco history, has cut down from 3 pack down to 2 ppd      PLAN:  Trelegy 200 daily, okay to give 2 samples of trelegy today    Has LIANG and use CPAP at least 4 hours daily   LDCT in 3/26,   Tobacco cessation is imperative.    Chantix was not covered by insurance.     Discussed with the patient the current USPSTF guidelines released March 9, 2021 for screening for lung cancer.    For adults aged 50 to 80 years who have a 20 pack-year smoking history and currently smoke or have quit within the past 15 years the grade B recommendation is to:  Screen for lung cancer with low-dose computed tomography (LDCT) every year.  Stop screening once a person has not smoked for 15 years or has a health problem that limits life expectancy or the ability to have lung surgery.    The patient  reports that she has been smoking cigarettes. She has a 90 pack-year smoking history. She has been exposed to tobacco smoke. She has never used smokeless tobacco.. Discussed with patient the risks and benefits of screening, including over-diagnosis, false positive rate, and total radiation exposure.  The patient currently exhibits no signs or symptoms suggestive of lung cancer.  Discussed with patient the importance of compliance with yearly annual lung cancer screenings and willingness to undergo diagnosis and treatment if screening scan is positive.  In addition, the patient was counseled regarding the importance of remaining smoke free and/or total smoking cessation.    Also reviewed the following if the patient has Medicare that as of February 10, 2022, Medicare only covers LDCT screening in patients aged 50-77 with at least a 20 pack-year smoking history who currently smoke or have quit in the last 15 years

## 2025-03-07 DIAGNOSIS — G47.33 SEVERE OBSTRUCTIVE SLEEP APNEA: ICD-10-CM

## 2025-03-07 RX ORDER — FLUTICASONE FUROATE, UMECLIDINIUM BROMIDE AND VILANTEROL TRIFENATATE 200; 62.5; 25 UG/1; UG/1; UG/1
POWDER RESPIRATORY (INHALATION)
Qty: 3 EACH | Refills: 5 | Status: SHIPPED | OUTPATIENT
Start: 2025-03-07

## 2025-03-12 DIAGNOSIS — F32.89 OTHER DEPRESSION: ICD-10-CM

## 2025-03-12 DIAGNOSIS — R60.9 DEPENDENT EDEMA: ICD-10-CM

## 2025-03-12 RX ORDER — BUPROPION HYDROCHLORIDE 150 MG/1
TABLET ORAL
Qty: 90 TABLET | Refills: 3 | Status: SHIPPED | OUTPATIENT
Start: 2025-03-12

## 2025-03-12 RX ORDER — FUROSEMIDE 40 MG/1
40 TABLET ORAL DAILY
Qty: 90 TABLET | Refills: 3 | Status: SHIPPED | OUTPATIENT
Start: 2025-03-12

## 2025-04-07 DIAGNOSIS — E11.9 TYPE 2 DIABETES MELLITUS WITHOUT COMPLICATION, WITHOUT LONG-TERM CURRENT USE OF INSULIN: ICD-10-CM

## 2025-04-07 RX ORDER — SEMAGLUTIDE 0.68 MG/ML
0.5 INJECTION, SOLUTION SUBCUTANEOUS WEEKLY
Qty: 3 ML | Refills: 5 | Status: SHIPPED | COMMUNITY
Start: 2025-04-07

## 2025-05-06 DIAGNOSIS — E11.9 TYPE 2 DIABETES MELLITUS WITHOUT COMPLICATION, WITHOUT LONG-TERM CURRENT USE OF INSULIN (HCC): ICD-10-CM

## 2025-05-06 RX ORDER — SEMAGLUTIDE 0.68 MG/ML
0.5 INJECTION, SOLUTION SUBCUTANEOUS WEEKLY
Qty: 2 ML | Refills: 0 | Status: SHIPPED | COMMUNITY
Start: 2025-05-06 | End: 2025-05-07 | Stop reason: SDUPTHER

## 2025-05-07 DIAGNOSIS — E11.9 TYPE 2 DIABETES MELLITUS WITHOUT COMPLICATION, WITHOUT LONG-TERM CURRENT USE OF INSULIN (HCC): ICD-10-CM

## 2025-05-07 RX ORDER — SEMAGLUTIDE 0.68 MG/ML
0.5 INJECTION, SOLUTION SUBCUTANEOUS WEEKLY
Qty: 2 ML | Refills: 2 | Status: SHIPPED | OUTPATIENT
Start: 2025-05-07

## 2025-05-14 DIAGNOSIS — R06.02 SHORTNESS OF BREATH: ICD-10-CM

## 2025-05-14 RX ORDER — ALBUTEROL SULFATE 90 UG/1
INHALANT RESPIRATORY (INHALATION)
Qty: 6.7 G | Refills: 3 | Status: SHIPPED | OUTPATIENT
Start: 2025-05-14

## 2025-05-29 ENCOUNTER — OFFICE VISIT (OUTPATIENT)
Dept: FAMILY MEDICINE CLINIC | Age: 70
End: 2025-05-29
Payer: MEDICARE

## 2025-05-29 VITALS
BODY MASS INDEX: 37.15 KG/M2 | HEART RATE: 72 BPM | OXYGEN SATURATION: 96 % | SYSTOLIC BLOOD PRESSURE: 126 MMHG | HEIGHT: 65 IN | WEIGHT: 223 LBS | DIASTOLIC BLOOD PRESSURE: 78 MMHG

## 2025-05-29 DIAGNOSIS — M51.360 DEGENERATION OF INTERVERTEBRAL DISC OF LUMBAR REGION WITH DISCOGENIC BACK PAIN: ICD-10-CM

## 2025-05-29 DIAGNOSIS — I27.20 PULMONARY HTN (HCC): ICD-10-CM

## 2025-05-29 DIAGNOSIS — I10 PRIMARY HYPERTENSION: Chronic | ICD-10-CM

## 2025-05-29 DIAGNOSIS — J44.9 CHRONIC OBSTRUCTIVE PULMONARY DISEASE, UNSPECIFIED COPD TYPE (HCC): Chronic | ICD-10-CM

## 2025-05-29 DIAGNOSIS — F41.9 ANXIETY: ICD-10-CM

## 2025-05-29 DIAGNOSIS — I25.10 CORONARY ARTERY DISEASE INVOLVING NATIVE CORONARY ARTERY OF NATIVE HEART WITHOUT ANGINA PECTORIS: Primary | ICD-10-CM

## 2025-05-29 DIAGNOSIS — E11.9 TYPE 2 DIABETES MELLITUS WITHOUT COMPLICATION, WITHOUT LONG-TERM CURRENT USE OF INSULIN (HCC): ICD-10-CM

## 2025-05-29 PROCEDURE — 3044F HG A1C LEVEL LT 7.0%: CPT | Performed by: FAMILY MEDICINE

## 2025-05-29 PROCEDURE — 36415 COLL VENOUS BLD VENIPUNCTURE: CPT | Performed by: FAMILY MEDICINE

## 2025-05-29 PROCEDURE — G2211 COMPLEX E/M VISIT ADD ON: HCPCS | Performed by: FAMILY MEDICINE

## 2025-05-29 PROCEDURE — 1123F ACP DISCUSS/DSCN MKR DOCD: CPT | Performed by: FAMILY MEDICINE

## 2025-05-29 PROCEDURE — 99214 OFFICE O/P EST MOD 30 MIN: CPT | Performed by: FAMILY MEDICINE

## 2025-05-29 PROCEDURE — 3078F DIAST BP <80 MM HG: CPT | Performed by: FAMILY MEDICINE

## 2025-05-29 PROCEDURE — 3074F SYST BP LT 130 MM HG: CPT | Performed by: FAMILY MEDICINE

## 2025-05-29 PROCEDURE — 1159F MED LIST DOCD IN RCRD: CPT | Performed by: FAMILY MEDICINE

## 2025-05-29 RX ORDER — BUSPIRONE HYDROCHLORIDE 10 MG/1
10 TABLET ORAL 2 TIMES DAILY
Qty: 60 TABLET | Refills: 3 | Status: SHIPPED | OUTPATIENT
Start: 2025-05-29

## 2025-05-29 ASSESSMENT — ENCOUNTER SYMPTOMS
BACK PAIN: 1
SHORTNESS OF BREATH: 0

## 2025-05-29 NOTE — PROGRESS NOTES
Chief Complaint   Patient presents with    Hypertension    Hyperlipidemia    Diabetes       HPI:  Samina Hernandezn is a 70 y.o. (: 1955) here today   for   Hypertension  This is a chronic problem. The current episode started more than 1 year ago. Associated symptoms include headaches. Pertinent negatives include no shortness of breath. Risk factors for coronary artery disease include diabetes mellitus, dyslipidemia and post-menopausal state. Past treatments include diuretics. There are no compliance problems.    Hyperlipidemia  This is a chronic problem. The current episode started more than 1 year ago. Exacerbating diseases include diabetes. Pertinent negatives include no shortness of breath. Current antihyperlipidemic treatment includes statins. There are no compliance problems.  Risk factors for coronary artery disease include diabetes mellitus, dyslipidemia, hypertension and post-menopausal.   Diabetes  She presents for her follow-up diabetic visit. She has type 2 diabetes mellitus. Hypoglycemia symptoms include headaches and nervousness/anxiousness. Risk factors for coronary artery disease include diabetes mellitus, dyslipidemia, hypertension and post-menopausal. She monitors blood glucose at home 5+ x per day (has CGM). An ACE inhibitor/angiotensin II receptor blocker is not being taken.     Seen by cardio recently.  Abn EKG.  Rbbb.  Echo next week.      Snacks more in evening.  Has been trying to eat more fruits and veggies.     Cardio inc lipitor last mo.     Breathing near baseline.  [place] trelegy.  Has been trying to cut back on smoking.      Back has been doing fairly well.  Prior injections.      Patient's medications, allergies, past medical, surgical, social and family histories were reviewed and updated as appropriate.    ROS:  Review of Systems   Constitutional:  Negative for fever.   Respiratory:  Negative for shortness of breath.    Musculoskeletal:  Positive for back pain.   Neurological:

## 2025-05-30 LAB
ALBUMIN SERPL-MCNC: 4 G/DL (ref 3.4–5)
ALBUMIN/GLOB SERPL: 1.6 {RATIO} (ref 1.1–2.2)
ALP SERPL-CCNC: 140 U/L (ref 40–129)
ALT SERPL-CCNC: 13 U/L (ref 10–40)
ANION GAP SERPL CALCULATED.3IONS-SCNC: 11 MMOL/L (ref 3–16)
AST SERPL-CCNC: 18 U/L (ref 15–37)
BILIRUB SERPL-MCNC: 0.6 MG/DL (ref 0–1)
BUN SERPL-MCNC: 13 MG/DL (ref 7–20)
CALCIUM SERPL-MCNC: 9.3 MG/DL (ref 8.3–10.6)
CHLORIDE SERPL-SCNC: 100 MMOL/L (ref 99–110)
CHOLEST SERPL-MCNC: 150 MG/DL (ref 0–199)
CO2 SERPL-SCNC: 29 MMOL/L (ref 21–32)
CREAT SERPL-MCNC: 0.8 MG/DL (ref 0.6–1.2)
EST. AVERAGE GLUCOSE BLD GHB EST-MCNC: 111.2 MG/DL
FOLATE SERPL-MCNC: 11.2 NG/ML (ref 4.78–24.2)
GFR SERPLBLD CREATININE-BSD FMLA CKD-EPI: 80 ML/MIN/{1.73_M2}
GLUCOSE SERPL-MCNC: 98 MG/DL (ref 70–99)
HBA1C MFR BLD: 5.5 %
HDLC SERPL-MCNC: 51 MG/DL (ref 40–60)
LDLC SERPL CALC-MCNC: 70 MG/DL
MAGNESIUM SERPL-MCNC: 2.24 MG/DL (ref 1.8–2.4)
POTASSIUM SERPL-SCNC: 4.7 MMOL/L (ref 3.5–5.1)
PROT SERPL-MCNC: 6.5 G/DL (ref 6.4–8.2)
SODIUM SERPL-SCNC: 140 MMOL/L (ref 136–145)
TRIGL SERPL-MCNC: 147 MG/DL (ref 0–150)
VIT B12 SERPL-MCNC: 3903 PG/ML (ref 211–911)
VLDLC SERPL CALC-MCNC: 29 MG/DL

## 2025-06-02 ENCOUNTER — RESULTS FOLLOW-UP (OUTPATIENT)
Dept: FAMILY MEDICINE CLINIC | Age: 70
End: 2025-06-02

## 2025-06-02 NOTE — RESULT ENCOUNTER NOTE
Plenty of B12.  Folate is okay.  Mild elevation in alkaline phosphatase, otherwise CMP is normal.  Lipids improved from last check.  Hemoglobin A1c excellent.  Magnesium normal.  No other changes at this time we will plan on repeating labs around next office visit

## 2025-07-02 DIAGNOSIS — G47.33 SEVERE OBSTRUCTIVE SLEEP APNEA: ICD-10-CM

## 2025-07-02 DIAGNOSIS — M51.369 DDD (DEGENERATIVE DISC DISEASE), LUMBAR: ICD-10-CM

## 2025-07-02 DIAGNOSIS — R20.8 BURNING SENSATION OF FEET: ICD-10-CM

## 2025-07-02 DIAGNOSIS — E11.9 TYPE 2 DIABETES MELLITUS WITHOUT COMPLICATION, WITHOUT LONG-TERM CURRENT USE OF INSULIN (HCC): ICD-10-CM

## 2025-07-02 RX ORDER — FLUTICASONE FUROATE, UMECLIDINIUM BROMIDE AND VILANTEROL TRIFENATATE 200; 62.5; 25 UG/1; UG/1; UG/1
1 POWDER RESPIRATORY (INHALATION) DAILY
Qty: 3 EACH | Refills: 5 | Status: SHIPPED | OUTPATIENT
Start: 2025-07-02 | End: 2025-07-03 | Stop reason: SDUPTHER

## 2025-07-02 RX ORDER — GABAPENTIN 100 MG/1
CAPSULE ORAL
Qty: 90 CAPSULE | Refills: 2 | Status: SHIPPED | OUTPATIENT
Start: 2025-07-09 | End: 2025-08-08

## 2025-07-02 NOTE — TELEPHONE ENCOUNTER
Date of last refill of this med was 11/13/2024, # of pills given 90 and # of refills given 2.  Their next appointment is 10/1/2025, the last date patient was seen was 5/29/2025.  Does patient have medication agreement on file? Yes  Has drug screen been done in last 12 months if needed? no

## 2025-07-03 DIAGNOSIS — G47.33 SEVERE OBSTRUCTIVE SLEEP APNEA: ICD-10-CM

## 2025-07-03 RX ORDER — FLUTICASONE FUROATE, UMECLIDINIUM BROMIDE AND VILANTEROL TRIFENATATE 200; 62.5; 25 UG/1; UG/1; UG/1
1 POWDER RESPIRATORY (INHALATION) DAILY
Qty: 2 EACH | Refills: 0 | Status: SHIPPED | COMMUNITY
Start: 2025-07-03

## 2025-07-07 RX ORDER — ATORVASTATIN CALCIUM 40 MG/1
TABLET, FILM COATED ORAL
Qty: 90 TABLET | Refills: 3 | Status: SHIPPED | OUTPATIENT
Start: 2025-07-07

## 2025-07-17 DIAGNOSIS — G47.9 SLEEP DISTURBANCE: ICD-10-CM

## 2025-07-17 RX ORDER — MIRTAZAPINE 15 MG/1
TABLET, FILM COATED ORAL
Qty: 90 TABLET | Refills: 0 | Status: SHIPPED | OUTPATIENT
Start: 2025-07-17

## 2025-08-13 DIAGNOSIS — R06.02 SHORTNESS OF BREATH: ICD-10-CM

## 2025-08-13 RX ORDER — FLASH GLUCOSE SCANNING READER
1 EACH MISCELLANEOUS
Qty: 1 EACH | Refills: 0 | Status: SHIPPED | OUTPATIENT
Start: 2025-08-13

## 2025-08-13 RX ORDER — ALBUTEROL SULFATE 90 UG/1
2 INHALANT RESPIRATORY (INHALATION) EVERY 6 HOURS PRN
Qty: 6.7 G | Refills: 3 | Status: SHIPPED | OUTPATIENT
Start: 2025-08-13

## (undated) DEVICE — ENDO CARRY-ON PROCEDURE KIT INCLUDES SUCTION TUBING, LUBRICANT, GAUZE, BIOHAZARD STICKER, TRANSPORT PAD AND INTERCEPT BEDSIDE KIT.: Brand: ENDO CARRY-ON PROCEDURE KIT

## (undated) DEVICE — CONMED SCOPE SAVER BITE BLOCK, 20X27 MM: Brand: SCOPE SAVER

## (undated) DEVICE — BAG DRNGE COMB PK

## (undated) DEVICE — STERILE POLYISOPRENE POWDER-FREE SURGICAL GLOVES: Brand: PROTEXIS

## (undated) DEVICE — ELECTRODE ECG MONITR FOAM TEAR DROP ADLT RED

## (undated) DEVICE — SOLUTION IRRIG 2000ML STRL H2O UROMATIC PLAS CONT USP

## (undated) DEVICE — CYSTO: Brand: MEDLINE INDUSTRIES, INC.